# Patient Record
Sex: MALE | Race: WHITE | NOT HISPANIC OR LATINO | Employment: FULL TIME | URBAN - METROPOLITAN AREA
[De-identification: names, ages, dates, MRNs, and addresses within clinical notes are randomized per-mention and may not be internally consistent; named-entity substitution may affect disease eponyms.]

---

## 2018-12-12 ENCOUNTER — OFFICE VISIT (OUTPATIENT)
Dept: UROLOGY | Facility: MEDICAL CENTER | Age: 63
End: 2018-12-12

## 2018-12-12 VITALS
DIASTOLIC BLOOD PRESSURE: 80 MMHG | HEIGHT: 70 IN | BODY MASS INDEX: 31.21 KG/M2 | HEART RATE: 63 BPM | WEIGHT: 218 LBS | SYSTOLIC BLOOD PRESSURE: 140 MMHG

## 2018-12-12 DIAGNOSIS — R31.29 MICROSCOPIC HEMATURIA: Primary | ICD-10-CM

## 2018-12-12 DIAGNOSIS — N40.0 BPH WITHOUT OBSTRUCTION/LOWER URINARY TRACT SYMPTOMS: ICD-10-CM

## 2018-12-12 DIAGNOSIS — N20.0 RENAL STONES: ICD-10-CM

## 2018-12-12 LAB
SL AMB  POCT GLUCOSE, UA: ABNORMAL
SL AMB LEUKOCYTE ESTERASE,UA: ABNORMAL
SL AMB POCT BILIRUBIN,UA: ABNORMAL
SL AMB POCT BLOOD,UA: ABNORMAL
SL AMB POCT CLARITY,UA: CLEAR
SL AMB POCT COLOR,UA: YELLOW
SL AMB POCT KETONES,UA: ABNORMAL
SL AMB POCT NITRITE,UA: ABNORMAL
SL AMB POCT PH,UA: 5.5
SL AMB POCT SPECIFIC GRAVITY,UA: 1.03
SL AMB POCT URINE PROTEIN: ABNORMAL
SL AMB POCT UROBILINOGEN: 0.2

## 2018-12-12 PROCEDURE — 81003 URINALYSIS AUTO W/O SCOPE: CPT | Performed by: UROLOGY

## 2018-12-12 PROCEDURE — 99203 OFFICE O/P NEW LOW 30 MIN: CPT | Performed by: UROLOGY

## 2018-12-12 RX ORDER — LEVOTHYROXINE AND LIOTHYRONINE 19; 4.5 UG/1; UG/1
30 TABLET ORAL DAILY
COMMUNITY

## 2018-12-12 RX ORDER — CHLORAL HYDRATE 500 MG
1000 CAPSULE ORAL DAILY
COMMUNITY

## 2018-12-12 RX ORDER — CELECOXIB 200 MG/1
200 CAPSULE ORAL 2 TIMES DAILY
COMMUNITY

## 2018-12-12 RX ORDER — AMLODIPINE BESYLATE 5 MG/1
5 TABLET ORAL DAILY
COMMUNITY

## 2018-12-12 NOTE — PROGRESS NOTES
Assessment/Plan:    Microscopic hematuria  Likely due to stones  He had this as far back as 2014 and has not developed more severe bleeding  We will obtain renal ultrasound  Probably not do a cysto  Renal stones  No symptoms  Last imaged almost 5 years ago  KUB and renal ultrasound ordered  BPH without obstruction/lower urinary tract symptoms  Asymptomatic  No further workup necessary  Diagnoses and all orders for this visit:    Microscopic hematuria  -     POCT urine dip auto non-scope    Renal stones  -     XR abdomen 1 view kub; Future  -     US retroperitoneal / kidney limited; Future    BPH without obstruction/lower urinary tract symptoms    Other orders  -     thyroid desiccated (ARMOUR) 30 mg tablet; Take 30 mg by mouth daily  -     celecoxib (CeleBREX) 200 mg capsule; Take 200 mg by mouth 2 (two) times a day  -     amLODIPine (NORVASC) 5 mg tablet; Take 5 mg by mouth daily  -     Omega-3 Fatty Acids (FISH OIL) 1,000 mg; Take 1,000 mg by mouth daily  -     Red Yeast Rice Extract (RED YEAST RICE PO); Take by mouth  -     co-enzyme Q-10 30 MG capsule; Take 30 mg by mouth 3 (three) times a day          Subjective:      Patient ID: Patricia Zendejas is a 66 y o  male  HPI   Microscopic hematuria:  Pt notes darker urine at times  Urine tested positive for blood in Janary 2014  He has a stone hx  Robb Munson smoking decades ago  History of kidney stones:  Based on CT from 2013, known retained stones  No sx  BPH:  He notes nocturia x 1  He denies other significant urinary symptoms  He denies gross hematuria, urinary tract infections or incontinence  He is taking neither medications nor supplements for his symptoms  The patient is due for a PSA  AUA SYMPTOM SCORE      Most Recent Value   AUA SYMPTOM SCORE   How often have you had a sensation of not emptying your bladder completely after you finished urinating?   1   How often have you had to urinate again less than two hours after you finished urinating? 0   How often have you found you stopped and started again several times when you urinate? 1   How often have you found it difficult to postpone urination? 0   How often have you had a weak urinary stream?  0   How often have you had to push or strain to begin urination? 0   How many times did you most typically get up to urinate from the time you went to bed at night until the time you got up in the morning? 1   Quality of Life: If you were to spend the rest of your life with your urinary condition just the way it is now, how would you feel about that?  1   AUA SYMPTOM SCORE  3          The following portions of the patient's history were reviewed and updated as appropriate: allergies, current medications, past family history, past medical history, past social history, past surgical history and problem list     Review of Systems   Constitutional: Negative for activity change and fatigue  Respiratory: Negative for shortness of breath and wheezing  Cardiovascular: Negative for chest pain  Hypertension  Gastrointestinal: Negative for abdominal pain  Endocrine:        Hypothyroidsm  Genitourinary: Negative for difficulty urinating, dysuria, frequency, hematuria and urgency  Musculoskeletal: Negative for back pain and gait problem  Skin: Negative  Allergic/Immunologic: Negative  Neurological: Negative  Psychiatric/Behavioral: Negative  Objective:      /80 (BP Location: Left arm, Patient Position: Sitting, Cuff Size: Standard)   Pulse 63   Ht 5' 9 5" (1 765 m)   Wt 98 9 kg (218 lb)   BMI 31 73 kg/m²          Physical Exam   Constitutional: He is oriented to person, place, and time  He appears well-developed and well-nourished  HENT:   Head: Normocephalic and atraumatic  Eyes: EOM are normal    Neck: Normal range of motion  Neck supple  Cardiovascular: Normal rate, regular rhythm and normal heart sounds      Pulmonary/Chest: Effort normal and breath sounds normal    Abdominal: Soft  Bowel sounds are normal  There is no tenderness  Genitourinary:   Genitourinary Comments: The prostate is approximately 30 g, firm, smooth, non-tender  Nl external genitalia  Musculoskeletal: Normal range of motion  Neurological: He is alert and oriented to person, place, and time  Skin: Skin is warm and dry  Psychiatric: He has a normal mood and affect   His behavior is normal  Judgment and thought content normal

## 2018-12-12 NOTE — ASSESSMENT & PLAN NOTE
Likely due to stones  He had this as far back as 2014 and has not developed more severe bleeding  We will obtain renal ultrasound  Probably not do a cysto

## 2018-12-12 NOTE — LETTER
December 12, 2018     Mariel Butcher DO  [address] [facility]    Patient: Harlan Denver   YOB: 1955   Date of Visit: 12/12/2018       Dear Dr Enio Dallas: Thank you for referring Harlan Denver to me for evaluation  Below are my notes for this consultation  If you have questions, please do not hesitate to call me  I look forward to following your patient along with you  Sincerely,        Leslee Hobbs MD        CC: No Recipients  Leslee Hobbs MD  12/12/2018 10:16 AM  Sign at close encounter  Assessment/Plan:    Microscopic hematuria  Likely due to stones  He had this as far back as 2014 and has not developed more severe bleeding  We will obtain renal ultrasound  Probably not do a cysto  Renal stones  No symptoms  Last imaged almost 5 years ago  KUB and renal ultrasound ordered  BPH without obstruction/lower urinary tract symptoms  Asymptomatic  No further workup necessary  Diagnoses and all orders for this visit:    Microscopic hematuria  -     POCT urine dip auto non-scope    Renal stones  -     XR abdomen 1 view kub; Future  -     US retroperitoneal / kidney limited; Future    BPH without obstruction/lower urinary tract symptoms    Other orders  -     thyroid desiccated (ARMOUR) 30 mg tablet; Take 30 mg by mouth daily  -     celecoxib (CeleBREX) 200 mg capsule; Take 200 mg by mouth 2 (two) times a day  -     amLODIPine (NORVASC) 5 mg tablet; Take 5 mg by mouth daily  -     Omega-3 Fatty Acids (FISH OIL) 1,000 mg; Take 1,000 mg by mouth daily  -     Red Yeast Rice Extract (RED YEAST RICE PO); Take by mouth  -     co-enzyme Q-10 30 MG capsule; Take 30 mg by mouth 3 (three) times a day          Subjective:      Patient ID: Harlan Denver is a 66 y o  male  HPI   Microscopic hematuria:  Pt notes darker urine at times  Urine tested positive for blood in Janary 2014  He has a stone hx  Dheeraj Castañeda smoking decades ago        History of kidney stones:  Based on CT from 2013, known retained stones  No sx  BPH:  He notes nocturia x 1  He denies other significant urinary symptoms  He denies gross hematuria, urinary tract infections or incontinence  He is taking neither medications nor supplements for his symptoms  The patient is due for a PSA  AUA SYMPTOM SCORE      Most Recent Value   AUA SYMPTOM SCORE   How often have you had a sensation of not emptying your bladder completely after you finished urinating? 1   How often have you had to urinate again less than two hours after you finished urinating? 0   How often have you found you stopped and started again several times when you urinate? 1   How often have you found it difficult to postpone urination? 0   How often have you had a weak urinary stream?  0   How often have you had to push or strain to begin urination? 0   How many times did you most typically get up to urinate from the time you went to bed at night until the time you got up in the morning? 1   Quality of Life: If you were to spend the rest of your life with your urinary condition just the way it is now, how would you feel about that?  1   AUA SYMPTOM SCORE  3          The following portions of the patient's history were reviewed and updated as appropriate: allergies, current medications, past family history, past medical history, past social history, past surgical history and problem list     Review of Systems   Constitutional: Negative for activity change and fatigue  Respiratory: Negative for shortness of breath and wheezing  Cardiovascular: Negative for chest pain  Hypertension  Gastrointestinal: Negative for abdominal pain  Endocrine:        Hypothyroidsm  Genitourinary: Negative for difficulty urinating, dysuria, frequency, hematuria and urgency  Musculoskeletal: Negative for back pain and gait problem  Skin: Negative  Allergic/Immunologic: Negative  Neurological: Negative  Psychiatric/Behavioral: Negative  Objective:      /80 (BP Location: Left arm, Patient Position: Sitting, Cuff Size: Standard)   Pulse 63   Ht 5' 9 5" (1 765 m)   Wt 98 9 kg (218 lb)   BMI 31 73 kg/m²           Physical Exam   Constitutional: He is oriented to person, place, and time  He appears well-developed and well-nourished  HENT:   Head: Normocephalic and atraumatic  Eyes: EOM are normal    Neck: Normal range of motion  Neck supple  Cardiovascular: Normal rate, regular rhythm and normal heart sounds  Pulmonary/Chest: Effort normal and breath sounds normal    Abdominal: Soft  Bowel sounds are normal  There is no tenderness  Genitourinary:   Genitourinary Comments: The prostate is approximately 30 g, firm, smooth, non-tender  Nl external genitalia  Musculoskeletal: Normal range of motion  Neurological: He is alert and oriented to person, place, and time  Skin: Skin is warm and dry  Psychiatric: He has a normal mood and affect   His behavior is normal  Judgment and thought content normal

## 2018-12-19 ENCOUNTER — TELEPHONE (OUTPATIENT)
Dept: UROLOGY | Facility: MEDICAL CENTER | Age: 63
End: 2018-12-19

## 2018-12-19 NOTE — TELEPHONE ENCOUNTER
----- Message from Allen Arauz MD sent at 12/19/2018  1:01 PM EST -----  Please call the patient regarding his abnormal result  On ultrasound, there is a 7 mm stone in the lower pole of the left kidney  It is not causing any obstruction  This did not show up on a standard x-ray  The stone was probably composed of uric acid  My recommendation is to leave it alone  It could be treated with ureteroscopy and laser fragmentation, however this seems to be overly aggressive to me  Offer the patient an office visit if he wishes to discuss this further    Thank you

## 2018-12-19 NOTE — TELEPHONE ENCOUNTER
Spoke with the patient and relayed Dr Lucio Fu message concerning his imaging results  Patient has no pain or discomfort with the stone and agrees not to have anything done about it at this time  He will keep his next scheduled appointment, but call if he needs us sooner

## 2020-08-25 PROBLEM — R79.89 ELEVATED LFTS: Status: ACTIVE | Noted: 2020-08-25

## 2021-01-09 ENCOUNTER — IMMUNIZATIONS (OUTPATIENT)
Dept: FAMILY MEDICINE CLINIC | Facility: HOSPITAL | Age: 66
End: 2021-01-09

## 2021-01-09 DIAGNOSIS — Z23 ENCOUNTER FOR IMMUNIZATION: ICD-10-CM

## 2021-01-09 PROCEDURE — 91301 SARS-COV-2 / COVID-19 MRNA VACCINE (MODERNA) 100 MCG: CPT

## 2021-01-09 PROCEDURE — 0011A SARS-COV-2 / COVID-19 MRNA VACCINE (MODERNA) 100 MCG: CPT

## 2021-02-06 ENCOUNTER — IMMUNIZATIONS (OUTPATIENT)
Dept: FAMILY MEDICINE CLINIC | Facility: HOSPITAL | Age: 66
End: 2021-02-06

## 2021-02-06 DIAGNOSIS — Z23 ENCOUNTER FOR IMMUNIZATION: Primary | ICD-10-CM

## 2021-02-06 PROCEDURE — 91301 SARS-COV-2 / COVID-19 MRNA VACCINE (MODERNA) 100 MCG: CPT

## 2021-02-06 PROCEDURE — 0012A SARS-COV-2 / COVID-19 MRNA VACCINE (MODERNA) 100 MCG: CPT

## 2021-06-28 ENCOUNTER — TELEPHONE (OUTPATIENT)
Dept: HEMATOLOGY ONCOLOGY | Facility: CLINIC | Age: 66
End: 2021-06-28

## 2021-06-28 NOTE — TELEPHONE ENCOUNTER
New Patient Encounter    New Patient Intake Form   Patient Details:  Nigel Espinosa  1955  [id number]    Background Information:  [address] starts by opening a telephone encounter and gathering the following information   Who is calling to schedule? If not self, relationship to patient? Patient   Referring Provider Al Barber   What is the diagnosis? Abn CARINA, SPEP   Is this Cancer or Non-Cancer? Non-Cancer   Is this diagnosis confirmed? Yes   When was the diagnosis? 6/2021   Is there a confirmed diagnosis from a biopsy/tissue reviewed by pathology? NA   Were outside slides requested? NA   Is patient aware of diagnosis? Yes   Is there a personal history and what kind? No   Is there a family history and what kind? No   Reason for visit? New Diagnosis   Have you had any imaging or labs done? If so: when, where? Yes  4/2021 [facility]   Are records in EPIC? yes   If patient has a prior history of cancer were old records obtained? NA   Was the patient told to bring a disk? No   Does the patient smoke or Vape? No   If yes, how many packs or cartridges per day? Scheduling Information:   [facility]:  [address] [place] [address]     Are there any dates/time the patient cannot be seen? Week of7/26 on vaction   Miscellaneous: pt does not know what ins he has, will get that info and call us back to schedule   After completing the above information, please route to Financial Counselor and the appropriate Nurse Navigator for review

## 2021-06-28 NOTE — TELEPHONE ENCOUNTER
New Patient Request   Patient Details:     Wilman Roque      1955      [id number]      Reason for Appointment   Who is calling to schedule? If not Patient, what is their name? Shae   What is the diagnosis? Elevated LFT's   Who is the referring doctor? Dr Sheth   Scheduling Information   Which department are you scheduling with ?  Hematology    Preferred [facility] Number to call back on? [phone]   Miscellaneous Information:     Please advise the patient, a new patient  will be calling them back within 1 business day

## 2021-07-08 NOTE — TELEPHONE ENCOUNTER
MOHSEN baileyg that pt returned my call yesterday  I left him another voicemail today requesting that he call the [facility] line with his insurance info and to schedule appointment

## 2021-07-15 NOTE — TELEPHONE ENCOUNTER
Spoke w/ patient  Ins info entered, unable to run RTE, insurance needs to be contacted by phone for verification  Finance please review  Pt has been scheduled with Sue Owenss in [facility] on 8/5/5021

## 2021-07-16 NOTE — TELEPHONE ENCOUNTER
[facility]  She stated that this an indemnity plan with limited benefits  Patient has 20 office visits per year  First 2 visits covered at $200, all others covered at $160  Patient has not used any visits for the year  Patient can see any doctor  Will confirm with Elaine King that patient can be seen

## 2021-08-05 ENCOUNTER — CONSULT (OUTPATIENT)
Dept: HEMATOLOGY ONCOLOGY | Facility: CLINIC | Age: 66
End: 2021-08-05
Payer: COMMERCIAL

## 2021-08-05 VITALS
RESPIRATION RATE: 16 BRPM | SYSTOLIC BLOOD PRESSURE: 110 MMHG | TEMPERATURE: 98 F | BODY MASS INDEX: 34.98 KG/M2 | HEART RATE: 64 BPM | DIASTOLIC BLOOD PRESSURE: 68 MMHG | OXYGEN SATURATION: 98 % | WEIGHT: 236.2 LBS | HEIGHT: 69 IN

## 2021-08-05 DIAGNOSIS — R79.89 ELEVATED LFTS: ICD-10-CM

## 2021-08-05 DIAGNOSIS — R77.8 ABNORMAL SPEP: Primary | ICD-10-CM

## 2021-08-05 PROCEDURE — 99204 OFFICE O/P NEW MOD 45 MIN: CPT | Performed by: PHYSICIAN ASSISTANT

## 2021-08-05 RX ORDER — HYDROCHLOROTHIAZIDE 25 MG/1
25 TABLET ORAL DAILY
COMMUNITY
Start: 2021-07-23

## 2021-08-05 RX ORDER — CLONIDINE HYDROCHLORIDE 0.1 MG/1
TABLET ORAL
COMMUNITY
Start: 2021-07-23

## 2021-08-05 NOTE — PROGRESS NOTES
Hematology/Oncology Outpatient Consult  Kylee Espinoza 61 y o  male 1955 [id number]    Date:  8/5/2021      Assessment and Plan:  1  Abnormal SPEP  73-year-old male presents for consultation regarding abnormal SPEP  This was performed by GI regarding her normal LFTs  There is spike noted on SPEP however unfortunately [facility] does not do reflex for immunofixation which is most important in interpreting this result  Patient will require repeat labs  Other labs as below  Follow-up in 3-4 weeks  We reviewed MGUS and multiple myeloma, more likely MGUS  - CBC and differential; Future  - Beta 2 microglobulin, serum; Future  - Comprehensive metabolic panel; Future  - IgG, IgA, IgM; Future  - Immunoglobulin free LT chains blood; Future  - Protein, Total and Protein Electrophoresis with Immunofixation; Future  - IMMUNOFIXATION (SHELLY) AND PROTEIN ELECTROPHORESIS, RANDOM URINE  - Immunofixation,Urine(Reflex Only-Do Not Order)      HPI:  73-year-old male presents for consultation regarding abnormal SPEP  This was completed in April 2021  This showed a restricted M spike in the beta 1 globin region  Immunofixation unfortunately was not done as a reflex test therefore the type in size of M spike is unknown  SPEP was ordered by GI for workup of elevated LFTs  ROS: Review of Systems   Constitutional: Positive for fatigue (mild)  Negative for appetite change, chills, fever and unexpected weight change  Respiratory: Negative for cough and shortness of breath  Cardiovascular: Negative for chest pain, palpitations and leg swelling  Gastrointestinal: Negative for abdominal pain, constipation, diarrhea, nausea and vomiting  Genitourinary: Negative for difficulty urinating, dysuria and hematuria  Musculoskeletal: Negative for arthralgias  Skin: Negative  Neurological: Negative for dizziness, weakness, light-headedness, numbness and headaches  Hematological: Negative      Psychiatric/Behavioral: Negative  Past Medical History:   Diagnosis Date    Abdominal pain     Acute infection of external ear     BPH with obstruction/lower urinary tract symptoms     BPH without urinary obstruction 11/2013    Chills (without fever)     Chronic sinusitis     Disease of thyroid gland     Enlarged prostate     Fatty liver     Headache     Hematuria     Hypertension     Joint pain     Kidney stone     Nausea and vomiting     Retention of urine     Streptococcal sore throat     Ureteral calculi     Wheezing        Past Surgical History:   Procedure Laterality Date    COLONOSCOPY  02/2010    by Dr Angulo-colon polyps    COLONOSCOPY  12/28/2017    MIKE Morrow D  / multiple polyps removed 2 of which were large   COLONOSCOPY  02/20/2019    MIKE Naik  / 3 polyps-tubular adenomas, diverticulosis    CYSTOSCOPY  2013    EYE SURGERY  2000       Social History     Socioeconomic History    Marital status: Single     Spouse name: None    Number of children: None    Years of education: None    Highest education level: None   Occupational History    None   Tobacco Use    Smoking status: Never Smoker    Smokeless tobacco: Never Used   Vaping Use    Vaping Use: Never used   Substance and Sexual Activity    Alcohol use: Yes     Comment: occasionally    Drug use: No    Sexual activity: None   Other Topics Concern    None   Social History Narrative    None     Social Determinants of Health     Financial Resource Strain:     Difficulty of Paying Living Expenses:    Food Insecurity:     Worried About Running Out of Food in the Last Year:     Ran Out of Food in the Last Year:    Transportation Needs:     Lack of Transportation (Medical):      Lack of Transportation (Non-Medical):    Physical Activity:     Days of Exercise per Week:     Minutes of Exercise per Session:    Stress:     Feeling of Stress :    Social Connections:     Frequency of Communication with Friends and Family:     Frequency of Social Gatherings with Friends and Family:     Attends Religion Services:     Active Member of Clubs or Organizations:     Attends Club or Organization Meetings:     Marital Status:    Intimate Partner Violence:     Fear of Current or Ex-Partner:     Emotionally Abused:     Physically Abused:     Sexually Abused:        Family History   Problem Relation Age of Onset    Cancer Father     Breast cancer Mother        No Known Allergies      Current Outpatient Medications:     amLODIPine (NORVASC) 5 mg tablet, Take 5 mg by mouth daily, Disp: , Rfl:     celecoxib (CeleBREX) 200 mg capsule, Take 200 mg by mouth 2 (two) times a day, Disp: , Rfl:     cloNIDine (CATAPRES) 0 1 mg tablet, TAKE 1 TABLET BY MOUTH IN THE MORNING AND 1 IN THE EVENING, Disp: , Rfl:     co-enzyme Q-10 30 MG capsule, Take 30 mg by mouth 2 (two) times a day , Disp: , Rfl:     hydrochlorothiazide (HYDRODIURIL) 25 mg tablet, Take 25 mg by mouth daily, Disp: , Rfl:     Omega-3 Fatty Acids (FISH OIL) 1,000 mg, Take 1,000 mg by mouth daily, Disp: , Rfl:     Red Yeast Rice Extract (RED YEAST RICE PO), Take by mouth, Disp: , Rfl:     thyroid desiccated (ARMOUR) 30 mg tablet, Take 30 mg by mouth daily, Disp: , Rfl:       Physical Exam:  /68 (BP Location: Left arm, Patient Position: Sitting, Cuff Size: Adult)   Pulse 64   Temp 98 °F (36 7 °C) (Temporal)   Resp 16   Ht 5' 9" (1 753 m)   Wt 107 kg (236 lb 3 2 oz)   SpO2 98%   BMI 34 88 kg/m²     Physical Exam  Vitals reviewed  Constitutional:       General: He is not in acute distress  Appearance: He is well-developed  He is not ill-appearing  HENT:      Head: Normocephalic and atraumatic  Eyes:      General: No scleral icterus  Conjunctiva/sclera: Conjunctivae normal    Cardiovascular:      Rate and Rhythm: Normal rate and regular rhythm  Heart sounds: Normal heart sounds  No murmur heard       Pulmonary:      Effort: Pulmonary effort is normal  No respiratory distress  Breath sounds: Normal breath sounds  Abdominal:      Palpations: Abdomen is soft  Tenderness: There is no abdominal tenderness  Musculoskeletal:         General: No tenderness  Normal range of motion  Cervical back: Normal range of motion and neck supple  Right lower leg: No edema  Left lower leg: No edema  Lymphadenopathy:      Cervical: No cervical adenopathy  Skin:     General: Skin is warm and dry  Neurological:      Mental Status: He is alert and oriented to person, place, and time  Cranial Nerves: No cranial nerve deficit  Psychiatric:         Mood and Affect: Mood normal          Behavior: Behavior normal        Patient voiced understanding and agreement in the above discussion  Aware to contact our office with questions/symptoms in the interim  This note has been generated by voice recognition software system  Therefore, there may be spelling, grammar, and or syntax errors  Please contact if questions arise

## 2021-08-18 LAB
ALBUMIN SERPL ELPH-MCNC: 4 G/DL (ref 3.8–4.8)
ALBUMIN SERPL-MCNC: 4.1 G/DL (ref 3.6–5.1)
ALBUMIN/GLOB SERPL: 1.4 (CALC) (ref 1–2.5)
ALP SERPL-CCNC: 44 U/L (ref 35–144)
ALPHA1 GLOB SERPL ELPH-MCNC: 0.2 G/DL (ref 0.2–0.3)
ALPHA2 GLOB SERPL ELPH-MCNC: 0.7 G/DL (ref 0.5–0.9)
ALT SERPL-CCNC: 74 U/L (ref 9–46)
AST SERPL-CCNC: 33 U/L (ref 10–35)
B2 MICROGLOB SERPL-MCNC: 2.32 MG/L
BASOPHILS # BLD AUTO: 38 CELLS/UL (ref 0–200)
BASOPHILS NFR BLD AUTO: 0.6 %
BETA1 GLOB SERPL ELPH-MCNC: 0.6 G/DL (ref 0.4–0.6)
BETA2 GLOB SERPL ELPH-MCNC: 0.6 G/DL (ref 0.2–0.5)
BILIRUB SERPL-MCNC: 0.5 MG/DL (ref 0.2–1.2)
BUN SERPL-MCNC: 13 MG/DL (ref 7–25)
BUN/CREAT SERPL: ABNORMAL (CALC) (ref 6–22)
CALCIUM SERPL-MCNC: 9 MG/DL (ref 8.6–10.3)
CHLORIDE SERPL-SCNC: 101 MMOL/L (ref 98–110)
CO2 SERPL-SCNC: 26 MMOL/L (ref 20–32)
CREAT SERPL-MCNC: 0.98 MG/DL (ref 0.7–1.25)
EOSINOPHIL # BLD AUTO: 179 CELLS/UL (ref 15–500)
EOSINOPHIL NFR BLD AUTO: 2.8 %
ERYTHROCYTE [DISTWIDTH] IN BLOOD BY AUTOMATED COUNT: 12.4 % (ref 11–15)
GAMMA GLOB SERPL ELPH-MCNC: 1 G/DL (ref 0.8–1.7)
GLOBULIN SER CALC-MCNC: 3 G/DL (CALC) (ref 1.9–3.7)
GLUCOSE SERPL-MCNC: 114 MG/DL (ref 65–99)
HCT VFR BLD AUTO: 47.9 % (ref 38.5–50)
HGB BLD-MCNC: 16.2 G/DL (ref 13.2–17.1)
IGA SERPL-MCNC: 554 MG/DL (ref 70–320)
IGG SERPL-MCNC: 1167 MG/DL (ref 600–1540)
IGM SERPL-MCNC: 46 MG/DL (ref 50–300)
INTERPRETATION SERPL IFE-IMP: ABNORMAL
KAPPA LC FREE SER-MCNC: 27.8 MG/L (ref 3.3–19.4)
KAPPA LC FREE/LAMBDA FREE SER: 2.16 {RATIO} (ref 0.26–1.65)
LAMBDA LC FREE SERPL-MCNC: 12.9 MG/L (ref 5.7–26.3)
LYMPHOCYTES # BLD AUTO: 2835 CELLS/UL (ref 850–3900)
LYMPHOCYTES NFR BLD AUTO: 44.3 %
MCH RBC QN AUTO: 34.1 PG (ref 27–33)
MCHC RBC AUTO-ENTMCNC: 33.8 G/DL (ref 32–36)
MCV RBC AUTO: 100.8 FL (ref 80–100)
MONOCYTES # BLD AUTO: 512 CELLS/UL (ref 200–950)
MONOCYTES NFR BLD AUTO: 8 %
NEUTROPHILS # BLD AUTO: 2835 CELLS/UL (ref 1500–7800)
NEUTROPHILS NFR BLD AUTO: 44.3 %
PLATELET # BLD AUTO: 204 THOUSAND/UL (ref 140–400)
PMV BLD REES-ECKER: 10.3 FL (ref 7.5–12.5)
POTASSIUM SERPL-SCNC: 4.2 MMOL/L (ref 3.5–5.3)
PROT SERPL-MCNC: 7.1 G/DL (ref 6.1–8.1)
PROT SERPL-MCNC: 7.1 G/DL (ref 6.1–8.1)
RBC # BLD AUTO: 4.75 MILLION/UL (ref 4.2–5.8)
SL AMB EGFR AFRICAN AMERICAN: 94 ML/MIN/1.73M2
SL AMB EGFR NON AFRICAN AMERICAN: 81 ML/MIN/1.73M2
SODIUM SERPL-SCNC: 138 MMOL/L (ref 135–146)
WBC # BLD AUTO: 6.4 THOUSAND/UL (ref 3.8–10.8)

## 2021-09-01 ENCOUNTER — TELEPHONE (OUTPATIENT)
Dept: HEMATOLOGY ONCOLOGY | Facility: CLINIC | Age: 66
End: 2021-09-01

## 2021-09-01 ENCOUNTER — OFFICE VISIT (OUTPATIENT)
Dept: HEMATOLOGY ONCOLOGY | Facility: CLINIC | Age: 66
End: 2021-09-01
Payer: COMMERCIAL

## 2021-09-01 VITALS
HEIGHT: 69 IN | TEMPERATURE: 97.7 F | DIASTOLIC BLOOD PRESSURE: 70 MMHG | WEIGHT: 235 LBS | BODY MASS INDEX: 34.8 KG/M2 | OXYGEN SATURATION: 96 % | RESPIRATION RATE: 16 BRPM | HEART RATE: 71 BPM | SYSTOLIC BLOOD PRESSURE: 118 MMHG

## 2021-09-01 DIAGNOSIS — R76.8 ELEVATED SERUM IMMUNOGLOBULIN FREE LIGHT CHAIN LEVEL: Primary | ICD-10-CM

## 2021-09-01 PROCEDURE — 99214 OFFICE O/P EST MOD 30 MIN: CPT | Performed by: PHYSICIAN ASSISTANT

## 2021-09-01 NOTE — TELEPHONE ENCOUNTER
Called IR scheduling, per Joe Mackey they will be reaching out to patient to schedule BMBX  Patient was informed

## 2021-09-01 NOTE — H&P (VIEW-ONLY)
Hematology/Oncology Outpatient Follow-up  Elizabeth Castorena 61 y o  male 1955 [id number]    Date:  9/1/2021      Assessment and Plan:  1  Elevated serum immunoglobulin free light chain level  81-year-old male presents for follow-up regarding history of abnormal possible SPEP  This is ordered by GI for workup of abnormal LFTs  The Regional SPEP that was completed did not reflex to immunofixation  Therefore the an SPEP was repeated with immunofixation  SPEP on 08/14/2021 showed no monoclonal gammopathy  CMP showed a normal creatinine, calcium,  Alk-phos, AST was 33  ALT still elevated at 74  CBC showed a normal hemoglobin of 16 2,  8, platelet count 312, WBC 6 4 with a normal differential     IgA is elevated at 554  IgG 1167  IgM 46  Kappa free light chain mildly elevated at 27 8, lambda light chain 12 9, kappa/lambda ratio 2  16  Beta 2 microglobulin 2 32  Reviewed with patient that this could be consistent with light chain MGUS  To meet this criteria patient must have an abnormal free light chain ratio > 1 65  as well as the increased kappa free light chain, for which he fits this criteria  No monoclonal immunoglobulin heavy chain; fits this criteria  Patient is also without hypercalcemia, renal insufficiency, and anemia  Reviewed that we need to know the percentage of clonal lymphoplasmacytic cells in the bone marrow  If these are  fever than 10% then he would fit the criteria of light chain MGUS  Additionally absence of lytic bone lesions would fit criteria; therefore he also needs a skeletal survey  Insurance barriers:   Patient had been only seeing PCP and had a plan with PCP office for cash payment  He recently did purchase insurance  He is unaware on coverage of this insurance  He is unaware of the deductible that he has  He states that the labs that were completed ordered by myself did cost him out of pocket 300 dollars    In regards to an IR guided bone marrow biopsy I am concerned about the out of pocket cost this may cause the patient  I advised him to cause insurance to review this  He also is going to ask his PCP for the best paying area that he could receive the skeletal survey  He will contact us regarding this update  If they do not hear from patient within a week we will contact him  If a bone marrow biopsy in the office is of better cost him this could also be conducted  I did review with Dr Victorina Nava who would be happy to perform this at the [facility] office  Follow up 6-8 weeks  - XR bone survey complete >12 mos; Future  - Ambulatory referral to Interventional Radiology; Future    HPI:  81-year-old male presents for follow up regarding abnormal SPEP  This was completed in April 2021  This showed a restricted M spike in the beta 1 globin region  Immunofixation unfortunately was not done as a reflex test therefore the type in size of M spike is unknown  SPEP was ordered by GI for workup of elevated LFTs  Interval history: no new complaints     ROS: Review of Systems   Constitutional: Negative for appetite change, chills, fatigue, fever and unexpected weight change  HENT: Negative for mouth sores and nosebleeds  Respiratory: Negative for cough and shortness of breath  Cardiovascular: Negative for chest pain, palpitations and leg swelling  Gastrointestinal: Negative for abdominal pain, constipation, diarrhea, nausea and vomiting  Genitourinary: Negative for difficulty urinating, dysuria and hematuria  Musculoskeletal: Positive for arthralgias (right hip, chronic x years, has been told has arthritis; image records not available for review)  Skin: Negative  Neurological: Negative for dizziness, weakness, light-headedness, numbness and headaches  Hematological: Negative  Psychiatric/Behavioral: Negative          Past Medical History:   Diagnosis Date    Abdominal pain     Acute infection of external ear     BPH with obstruction/lower urinary tract symptoms     BPH without urinary obstruction 11/2013    Chills (without fever)     Chronic sinusitis     Disease of thyroid gland     Enlarged prostate     Fatty liver     Headache     Hematuria     Hypertension     Joint pain     Kidney stone     Nausea and vomiting     Retention of urine     Streptococcal sore throat     Ureteral calculi     Wheezing        Past Surgical History:   Procedure Laterality Date    COLONOSCOPY  02/2010    by Dr Angulo-colon polyps    COLONOSCOPY  12/28/2017    MIKE Velazquez D  / multiple polyps removed 2 of which were large   COLONOSCOPY  02/20/2019    MIKE Castle D  / 3 polyps-tubular adenomas, diverticulosis    CYSTOSCOPY  2013    EYE SURGERY  2000       Social History     Socioeconomic History    Marital status: Single     Spouse name: None    Number of children: None    Years of education: None    Highest education level: None   Occupational History    None   Tobacco Use    Smoking status: Never Smoker    Smokeless tobacco: Never Used   Vaping Use    Vaping Use: Never used   Substance and Sexual Activity    Alcohol use: Yes     Comment: occasionally    Drug use: No    Sexual activity: None   Other Topics Concern    None   Social History Narrative    None     Social Determinants of Health     Financial Resource Strain:     Difficulty of Paying Living Expenses:    Food Insecurity:     Worried About Running Out of Food in the Last Year:     Ran Out of Food in the Last Year:    Transportation Needs:     Lack of Transportation (Medical):      Lack of Transportation (Non-Medical):    Physical Activity:     Days of Exercise per Week:     Minutes of Exercise per Session:    Stress:     Feeling of Stress :    Social Connections:     Frequency of Communication with Friends and Family:     Frequency of Social Gatherings with Friends and Family:     Attends Alevism Services:     Active Member of Clubs or Organizations:     Attends Club or Organization Meetings:     Marital Status:    Intimate Partner Violence:     Fear of Current or Ex-Partner:     Emotionally Abused:     Physically Abused:     Sexually Abused:        Family History   Problem Relation Age of Onset    Cancer Father     Breast cancer Mother        Allergies   Allergen Reactions    Lisinopril Cough         Current Outpatient Medications:     amLODIPine (NORVASC) 5 mg tablet, Take 5 mg by mouth daily, Disp: , Rfl:     celecoxib (CeleBREX) 200 mg capsule, Take 200 mg by mouth 2 (two) times a day, Disp: , Rfl:     cloNIDine (CATAPRES) 0 1 mg tablet, TAKE 1 TABLET BY MOUTH IN THE MORNING AND 1 IN THE EVENING, Disp: , Rfl:     co-enzyme Q-10 30 MG capsule, Take 30 mg by mouth 2 (two) times a day , Disp: , Rfl:     hydrochlorothiazide (HYDRODIURIL) 25 mg tablet, Take 25 mg by mouth daily, Disp: , Rfl:     Omega-3 Fatty Acids (FISH OIL) 1,000 mg, Take 1,000 mg by mouth daily, Disp: , Rfl:     Red Yeast Rice Extract (RED YEAST RICE PO), Take by mouth, Disp: , Rfl:     thyroid desiccated (ARMOUR) 30 mg tablet, Take 30 mg by mouth daily, Disp: , Rfl:       Physical Exam:  /70 (BP Location: Left arm, Patient Position: Sitting, Cuff Size: Adult)   Pulse 71   Temp 97 7 °F (36 5 °C) (Temporal)   Resp 16   Ht 5' 9" (1 753 m)   Wt 107 kg (235 lb)   SpO2 96%   BMI 34 70 kg/m²     Physical Exam  Vitals reviewed  Constitutional:       General: He is not in acute distress  Appearance: He is well-developed  He is not ill-appearing  HENT:      Head: Normocephalic and atraumatic  Eyes:      General: No scleral icterus  Conjunctiva/sclera: Conjunctivae normal    Cardiovascular:      Rate and Rhythm: Normal rate and regular rhythm  Heart sounds: Normal heart sounds  No murmur heard  Pulmonary:      Effort: Pulmonary effort is normal  No respiratory distress  Breath sounds: Normal breath sounds  Abdominal:      Palpations: Abdomen is soft  Tenderness: There is no abdominal tenderness  Musculoskeletal:         General: No tenderness  Normal range of motion  Cervical back: Normal range of motion and neck supple  Right lower leg: No edema  Left lower leg: No edema  Lymphadenopathy:      Cervical: No cervical adenopathy  Upper Body:      Right upper body: No supraclavicular or axillary adenopathy  Left upper body: No supraclavicular or axillary adenopathy  Skin:     General: Skin is warm and dry  Neurological:      Mental Status: He is alert and oriented to person, place, and time  Cranial Nerves: No cranial nerve deficit  Psychiatric:         Mood and Affect: Mood normal          Behavior: Behavior normal            Labs:  Lab Results   Component Value Date    WBC 6 4 08/14/2021    HGB 16 2 08/14/2021    HCT 47 9 08/14/2021     8 (H) 08/14/2021     08/14/2021     Lab Results   Component Value Date    K 4 2 08/14/2021     08/14/2021    CO2 26 08/14/2021    BUN 13 08/14/2021    CREATININE 0 98 08/14/2021    CALCIUM 9 0 08/14/2021    AST 33 08/14/2021    ALT 74 (H) 08/14/2021    ALKPHOS 44 08/14/2021     Patient voiced understanding and agreement in the above discussion  Aware to contact our office with questions/symptoms in the interim  This note has been generated by voice recognition software system  Therefore, there may be spelling, grammar, and or syntax errors  Please contact if questions arise

## 2021-09-01 NOTE — PROGRESS NOTES
Hematology/Oncology Outpatient Follow-up  Bailey Mcgovern 61 y o  male 1955 [id number]    Date:  9/1/2021      Assessment and Plan:  1  Elevated serum immunoglobulin free light chain level  62-year-old male presents for follow-up regarding history of abnormal possible SPEP  This is ordered by GI for workup of abnormal LFTs  The Regional SPEP that was completed did not reflex to immunofixation  Therefore the an SPEP was repeated with immunofixation  SPEP on 08/14/2021 showed no monoclonal gammopathy  CMP showed a normal creatinine, calcium,  Alk-phos, AST was 33  ALT still elevated at 74  CBC showed a normal hemoglobin of 16 2,  8, platelet count 857, WBC 6 4 with a normal differential     IgA is elevated at 554  IgG 1167  IgM 46  Kappa free light chain mildly elevated at 27 8, lambda light chain 12 9, kappa/lambda ratio 2  16  Beta 2 microglobulin 2 32  Reviewed with patient that this could be consistent with light chain MGUS  To meet this criteria patient must have an abnormal free light chain ratio > 1 65  as well as the increased kappa free light chain, for which he fits this criteria  No monoclonal immunoglobulin heavy chain; fits this criteria  Patient is also without hypercalcemia, renal insufficiency, and anemia  Reviewed that we need to know the percentage of clonal lymphoplasmacytic cells in the bone marrow  If these are  fever than 10% then he would fit the criteria of light chain MGUS  Additionally absence of lytic bone lesions would fit criteria; therefore he also needs a skeletal survey  Insurance barriers:   Patient had been only seeing PCP and had a plan with PCP office for cash payment  He recently did purchase insurance  He is unaware on coverage of this insurance  He is unaware of the deductible that he has  He states that the labs that were completed ordered by myself did cost him out of pocket 300 dollars    In regards to an IR guided bone marrow biopsy I am concerned about the out of pocket cost this may cause the patient  I advised him to cause insurance to review this  He also is going to ask his PCP for the best paying area that he could receive the skeletal survey  He will contact us regarding this update  If they do not hear from patient within a week we will contact him  If a bone marrow biopsy in the office is of better cost him this could also be conducted  I did review with Dr Delfino Bear who would be happy to perform this at the [facility] office  Follow up 6-8 weeks  - XR bone survey complete >12 mos; Future  - Ambulatory referral to Interventional Radiology; Future    HPI:  79-year-old male presents for follow up regarding abnormal SPEP  This was completed in April 2021  This showed a restricted M spike in the beta 1 globin region  Immunofixation unfortunately was not done as a reflex test therefore the type in size of M spike is unknown  SPEP was ordered by GI for workup of elevated LFTs  Interval history: no new complaints     ROS: Review of Systems   Constitutional: Negative for appetite change, chills, fatigue, fever and unexpected weight change  HENT: Negative for mouth sores and nosebleeds  Respiratory: Negative for cough and shortness of breath  Cardiovascular: Negative for chest pain, palpitations and leg swelling  Gastrointestinal: Negative for abdominal pain, constipation, diarrhea, nausea and vomiting  Genitourinary: Negative for difficulty urinating, dysuria and hematuria  Musculoskeletal: Positive for arthralgias (right hip, chronic x years, has been told has arthritis; image records not available for review)  Skin: Negative  Neurological: Negative for dizziness, weakness, light-headedness, numbness and headaches  Hematological: Negative  Psychiatric/Behavioral: Negative          Past Medical History:   Diagnosis Date    Abdominal pain     Acute infection of external ear     BPH with obstruction/lower urinary tract symptoms     BPH without urinary obstruction 11/2013    Chills (without fever)     Chronic sinusitis     Disease of thyroid gland     Enlarged prostate     Fatty liver     Headache     Hematuria     Hypertension     Joint pain     Kidney stone     Nausea and vomiting     Retention of urine     Streptococcal sore throat     Ureteral calculi     Wheezing        Past Surgical History:   Procedure Laterality Date    COLONOSCOPY  02/2010    by Dr Angulo-colon polyps    COLONOSCOPY  12/28/2017    MIKE Elder  / multiple polyps removed 2 of which were large   COLONOSCOPY  02/20/2019    MIKE Blanco  / 3 polyps-tubular adenomas, diverticulosis    CYSTOSCOPY  2013    EYE SURGERY  2000       Social History     Socioeconomic History    Marital status: Single     Spouse name: None    Number of children: None    Years of education: None    Highest education level: None   Occupational History    None   Tobacco Use    Smoking status: Never Smoker    Smokeless tobacco: Never Used   Vaping Use    Vaping Use: Never used   Substance and Sexual Activity    Alcohol use: Yes     Comment: occasionally    Drug use: No    Sexual activity: None   Other Topics Concern    None   Social History Narrative    None     Social Determinants of Health     Financial Resource Strain:     Difficulty of Paying Living Expenses:    Food Insecurity:     Worried About Running Out of Food in the Last Year:     Ran Out of Food in the Last Year:    Transportation Needs:     Lack of Transportation (Medical):      Lack of Transportation (Non-Medical):    Physical Activity:     Days of Exercise per Week:     Minutes of Exercise per Session:    Stress:     Feeling of Stress :    Social Connections:     Frequency of Communication with Friends and Family:     Frequency of Social Gatherings with Friends and Family:     Attends Caodaism Services:     Active Member of Clubs or Organizations:     Attends Club or Organization Meetings:     Marital Status:    Intimate Partner Violence:     Fear of Current or Ex-Partner:     Emotionally Abused:     Physically Abused:     Sexually Abused:        Family History   Problem Relation Age of Onset    Cancer Father     Breast cancer Mother        Allergies   Allergen Reactions    Lisinopril Cough         Current Outpatient Medications:     amLODIPine (NORVASC) 5 mg tablet, Take 5 mg by mouth daily, Disp: , Rfl:     celecoxib (CeleBREX) 200 mg capsule, Take 200 mg by mouth 2 (two) times a day, Disp: , Rfl:     cloNIDine (CATAPRES) 0 1 mg tablet, TAKE 1 TABLET BY MOUTH IN THE MORNING AND 1 IN THE EVENING, Disp: , Rfl:     co-enzyme Q-10 30 MG capsule, Take 30 mg by mouth 2 (two) times a day , Disp: , Rfl:     hydrochlorothiazide (HYDRODIURIL) 25 mg tablet, Take 25 mg by mouth daily, Disp: , Rfl:     Omega-3 Fatty Acids (FISH OIL) 1,000 mg, Take 1,000 mg by mouth daily, Disp: , Rfl:     Red Yeast Rice Extract (RED YEAST RICE PO), Take by mouth, Disp: , Rfl:     thyroid desiccated (ARMOUR) 30 mg tablet, Take 30 mg by mouth daily, Disp: , Rfl:       Physical Exam:  /70 (BP Location: Left arm, Patient Position: Sitting, Cuff Size: Adult)   Pulse 71   Temp 97 7 °F (36 5 °C) (Temporal)   Resp 16   Ht 5' 9" (1 753 m)   Wt 107 kg (235 lb)   SpO2 96%   BMI 34 70 kg/m²     Physical Exam  Vitals reviewed  Constitutional:       General: He is not in acute distress  Appearance: He is well-developed  He is not ill-appearing  HENT:      Head: Normocephalic and atraumatic  Eyes:      General: No scleral icterus  Conjunctiva/sclera: Conjunctivae normal    Cardiovascular:      Rate and Rhythm: Normal rate and regular rhythm  Heart sounds: Normal heart sounds  No murmur heard  Pulmonary:      Effort: Pulmonary effort is normal  No respiratory distress  Breath sounds: Normal breath sounds  Abdominal:      Palpations: Abdomen is soft  Tenderness: There is no abdominal tenderness  Musculoskeletal:         General: No tenderness  Normal range of motion  Cervical back: Normal range of motion and neck supple  Right lower leg: No edema  Left lower leg: No edema  Lymphadenopathy:      Cervical: No cervical adenopathy  Upper Body:      Right upper body: No supraclavicular or axillary adenopathy  Left upper body: No supraclavicular or axillary adenopathy  Skin:     General: Skin is warm and dry  Neurological:      Mental Status: He is alert and oriented to person, place, and time  Cranial Nerves: No cranial nerve deficit  Psychiatric:         Mood and Affect: Mood normal          Behavior: Behavior normal            Labs:  Lab Results   Component Value Date    WBC 6 4 08/14/2021    HGB 16 2 08/14/2021    HCT 47 9 08/14/2021     8 (H) 08/14/2021     08/14/2021     Lab Results   Component Value Date    K 4 2 08/14/2021     08/14/2021    CO2 26 08/14/2021    BUN 13 08/14/2021    CREATININE 0 98 08/14/2021    CALCIUM 9 0 08/14/2021    AST 33 08/14/2021    ALT 74 (H) 08/14/2021    ALKPHOS 44 08/14/2021     Patient voiced understanding and agreement in the above discussion  Aware to contact our office with questions/symptoms in the interim  This note has been generated by voice recognition software system  Therefore, there may be spelling, grammar, and or syntax errors  Please contact if questions arise

## 2021-09-01 NOTE — PROGRESS NOTES
Do you need a follow up appointment for patient? I called IR they will be contacting patient directly to schedule  Patient was made aware

## 2021-09-01 NOTE — LETTER
September 1, 2021     Jose Rafael Beard, [address]  [address]    Patient: Cathy Mustafa   YOB: 1955   Date of Visit: 9/1/2021       Dear Dr Natasha Oseguera: Thank you for referring Cathy Mustafa to me for evaluation  Below are my notes for this consultation  If you have questions, please do not hesitate to call me  I look forward to following your patient along with you  Sincerely,        [facility], [place]        CC: No Recipients  [facility] [employer] [facility] [place], [place]  9/1/2021  9:47 AM  Incomplete  Hematology/Oncology Outpatient Follow-up  aCthy Mustafa 61 y o  male 1955 [id number]    Date:  9/1/2021      Assessment and Plan:  1  Elevated serum immunoglobulin free light chain level  60-year-old male presents for follow-up regarding history of abnormal possible SPEP  This is ordered by GI for workup of abnormal LFTs  The Regional SPEP that was completed did not reflex to immunofixation  Therefore the an SPEP was repeated with immunofixation  SPEP on 08/14/2021 showed no monoclonal gammopathy  CMP showed a normal creatinine, calcium,  Alk-phos, AST was 33  ALT still elevated at 74  CBC showed a normal hemoglobin of 16 2,  8, platelet count 208, WBC 6 4 with a normal differential     IgA is elevated at 554  IgG 1167  IgM 46  Kappa free light chain mildly elevated at 27 8, lambda light chain 12 9, kappa/lambda ratio 2  16  Beta 2 microglobulin 2 32  Reviewed with patient that this could be consistent with light chain MGUS  To meet this criteria patient must have an abnormal free light chain ratio > 1 65  as well as the increased kappa free light chain, for which he fits this criteria  No monoclonal immunoglobulin heavy chain; fits this criteria  Patient is also without hypercalcemia, renal insufficiency, and anemia  Reviewed that we need to know the percentage of clonal lymphoplasmacytic cells in the bone marrow    If these are  fever than 10% then he would fit the criteria of light chain MGUS  Additionally absence of lytic bone lesions would fit criteria; therefore he also needs a skeletal survey  Insurance barriers:   Patient had been only seeing PCP and had a plan with PCP office for cash payment  He recently did purchase insurance  He is unaware on coverage of this insurance so  He is unaware of the deductible that he has  He states that the labs that were completed ordered by myself did cost a metabolic 171 dollars  In regards to an IR guided bone marrow biopsy I am concerned about the out of pocket cost this may cause the patient  I advised him to cause insurance to review this  He also is going to ask his PCP for the best paying area that he could receive the skeletal survey  He will contact us regarding this update  If they do not hear from patient within a week we will contact him  If a bone marrow biopsy in the office is of better cost him this could also be conducted  I did review with Dr Kaykay Patel who would be happy to perform this at the [facility] office  Follow up 6-8 weeks  - XR bone survey complete >12 mos; Future  - Ambulatory referral to Interventional Radiology; Future    HPI:  21-year-old male presents for consultation regarding abnormal SPEP  This was completed in April 2021  This showed a restricted M spike in the beta 1 globin region  Immunofixation unfortunately was not done as a reflex test therefore the type in size of M spike is unknown  SPEP was ordered by GI for workup of elevated LFTs      Interval history:    ROS: Review of Systems    Past Medical History:   Diagnosis Date    Abdominal pain     Acute infection of external ear     BPH with obstruction/lower urinary tract symptoms     BPH without urinary obstruction 11/2013    Chills (without fever)     Chronic sinusitis     Disease of thyroid gland     Enlarged prostate     Fatty liver     Headache     Hematuria     Hypertension     Joint pain     Kidney stone     Nausea and vomiting     Retention of urine     Streptococcal sore throat     Ureteral calculi     Wheezing        Past Surgical History:   Procedure Laterality Date    COLONOSCOPY  02/2010    by Dr Angulo-colon polyps    COLONOSCOPY  12/28/2017    MIKE López  / multiple polyps removed 2 of which were large   COLONOSCOPY  02/20/2019    MIKE Bullard  / 3 polyps-tubular adenomas, diverticulosis    CYSTOSCOPY  2013    EYE SURGERY  2000       Social History     Socioeconomic History    Marital status: Single     Spouse name: None    Number of children: None    Years of education: None    Highest education level: None   Occupational History    None   Tobacco Use    Smoking status: Never Smoker    Smokeless tobacco: Never Used   Vaping Use    Vaping Use: Never used   Substance and Sexual Activity    Alcohol use: Yes     Comment: occasionally    Drug use: No    Sexual activity: None   Other Topics Concern    None   Social History Narrative    None     Social Determinants of Health     Financial Resource Strain:     Difficulty of Paying Living Expenses:    Food Insecurity:     Worried About Running Out of Food in the Last Year:     Ran Out of Food in the Last Year:    Transportation Needs:     Lack of Transportation (Medical):      Lack of Transportation (Non-Medical):    Physical Activity:     Days of Exercise per Week:     Minutes of Exercise per Session:    Stress:     Feeling of Stress :    Social Connections:     Frequency of Communication with Friends and Family:     Frequency of Social Gatherings with Friends and Family:     Attends Hinduism Services:     Active Member of Clubs or Organizations:     Attends Club or Organization Meetings:     Marital Status:    Intimate Partner Violence:     Fear of Current or Ex-Partner:     Emotionally Abused:     Physically Abused:     Sexually Abused:        Family History   Problem Relation Age of Onset    Cancer Father     Breast cancer Mother        Allergies   Allergen Reactions    Lisinopril Cough         Current Outpatient Medications:     amLODIPine (NORVASC) 5 mg tablet, Take 5 mg by mouth daily, Disp: , Rfl:     celecoxib (CeleBREX) 200 mg capsule, Take 200 mg by mouth 2 (two) times a day, Disp: , Rfl:     cloNIDine (CATAPRES) 0 1 mg tablet, TAKE 1 TABLET BY MOUTH IN THE MORNING AND 1 IN THE EVENING, Disp: , Rfl:     co-enzyme Q-10 30 MG capsule, Take 30 mg by mouth 2 (two) times a day , Disp: , Rfl:     hydrochlorothiazide (HYDRODIURIL) 25 mg tablet, Take 25 mg by mouth daily, Disp: , Rfl:     Omega-3 Fatty Acids (FISH OIL) 1,000 mg, Take 1,000 mg by mouth daily, Disp: , Rfl:     Red Yeast Rice Extract (RED YEAST RICE PO), Take by mouth, Disp: , Rfl:     thyroid desiccated (ARMOUR) 30 mg tablet, Take 30 mg by mouth daily, Disp: , Rfl:       Physical Exam:  /70 (BP Location: Left arm, Patient Position: Sitting, Cuff Size: Adult)   Pulse 71   Temp 97 7 °F (36 5 °C) (Temporal)   Resp 16   Ht 5' 9" (1 753 m)   Wt 107 kg (235 lb)   SpO2 96%   BMI 34 70 kg/m²     Physical Exam      Labs:  Lab Results   Component Value Date    WBC 6 4 08/14/2021    HGB 16 2 08/14/2021    HCT 47 9 08/14/2021     8 (H) 08/14/2021     08/14/2021     Lab Results   Component Value Date    K 4 2 08/14/2021     08/14/2021    CO2 26 08/14/2021    BUN 13 08/14/2021    CREATININE 0 98 08/14/2021    CALCIUM 9 0 08/14/2021    AST 33 08/14/2021    ALT 74 (H) 08/14/2021    ALKPHOS 44 08/14/2021     Patient voiced understanding and agreement in the above discussion  Aware to contact our office with questions/symptoms in the interim  This note has been generated by voice recognition software system  Therefore, there may be spelling, grammar, and or syntax errors  Please contact if questions arise                Zi Hernandez PA-C 9/1/2021  9:29 AM  Incomplete  Hematology/Oncology Outpatient Follow-up  Mihai Conklin 61 y o  male 1955 [id number]    Date:  9/1/2021      Assessment and Plan:    SPEP on 08/14/2021 showed no monoclonal gammopathy  CMP showed a normal creatinine, calcium,  Alk-phos, AST was 33  ALT still elevated at 74  CBC showed a normal hemoglobin of 16 2,  8, platelet count 040, WBC 6 4 with a normal differential     IgA is elevated at 554  IgG 1167  IgM 46  Kappa free light chain mildly elevated at 27 8, lambda light chain 12 9, kappa/lambda ratio 2  16  Beta 2 microglobulin 2 32  Light chain MGUS (LC-MGUS) is diagnosed by meeting the following three criteria (algorithm 1) [1,21]:    ?The presence of an abnormal FLC ratio (ie, ratio of kappa to lambda FLCs <0 26 or >1 65)    ? Increased level of the appropriate involved light chain (eg, increased kappa FLC in patients with a ratio >1 65     ? No monoclonal immunoglobulin heavy chain (IgG, IgA, IgD, or IgM)    ? Fewer than 10 percent clonal lymphoplasmacytic cells in the bone marrow    ? The absence of lytic bone lesions, anemia, hypercalcemia, and renal insufficiency related to the plasma cell proliferative process  HPI:  72-year-old male presents for consultation regarding abnormal SPEP  This was completed in April 2021  This showed a restricted M spike in the beta 1 globin region  Immunofixation unfortunately was not done as a reflex test therefore the type in size of M spike is unknown  SPEP was ordered by GI for workup of elevated LFTs      Interval history:    ROS: Review of Systems    Past Medical History:   Diagnosis Date    Abdominal pain     Acute infection of external ear     BPH with obstruction/lower urinary tract symptoms     BPH without urinary obstruction 11/2013    Chills (without fever)     Chronic sinusitis     Disease of thyroid gland     Enlarged prostate     Fatty liver     Headache     Hematuria     Hypertension     Joint pain     Kidney stone     Nausea and vomiting     Retention of urine     Streptococcal sore throat     Ureteral calculi     Wheezing        Past Surgical History:   Procedure Laterality Date    COLONOSCOPY  02/2010    by Dr Angulo-colon polyps    COLONOSCOPY  12/28/2017    MIKE Pelayo  / multiple polyps removed 2 of which were large   COLONOSCOPY  02/20/2019    MIKE Martin  / 3 polyps-tubular adenomas, diverticulosis    CYSTOSCOPY  2013    EYE SURGERY  2000       Social History     Socioeconomic History    Marital status: Single     Spouse name: Not on file    Number of children: Not on file    Years of education: Not on file    Highest education level: Not on file   Occupational History    Not on file   Tobacco Use    Smoking status: Never Smoker    Smokeless tobacco: Never Used   Vaping Use    Vaping Use: Never used   Substance and Sexual Activity    Alcohol use: Yes     Comment: occasionally    Drug use: No    Sexual activity: Not on file   Other Topics Concern    Not on file   Social History Narrative    Not on file     Social Determinants of Health     Financial Resource Strain:     Difficulty of Paying Living Expenses:    Food Insecurity:     Worried About Running Out of Food in the Last Year:     [place]lyn of Food in the Last Year:    Transportation Needs:     Lack of Transportation (Medical):      Lack of Transportation (Non-Medical):    Physical Activity:     Days of Exercise per Week:     Minutes of Exercise per Session:    Stress:     Feeling of Stress :    Social Connections:     Frequency of Communication with Friends and Family:     Frequency of Social Gatherings with Friends and Family:     Attends Anabaptism Services:     Active Member of Clubs or Organizations:     Attends Club or Organization Meetings:     Marital Status:    Intimate Partner Violence:     Fear of Current or Ex-Partner:     Emotionally Abused:  Physically Abused:     Sexually Abused:        Family History   Problem Relation Age of Onset    Cancer Father     Breast cancer Mother        No Known Allergies      Current Outpatient Medications:     amLODIPine (NORVASC) 5 mg tablet, Take 5 mg by mouth daily, Disp: , Rfl:     celecoxib (CeleBREX) 200 mg capsule, Take 200 mg by mouth 2 (two) times a day, Disp: , Rfl:     cloNIDine (CATAPRES) 0 1 mg tablet, TAKE 1 TABLET BY MOUTH IN THE MORNING AND 1 IN THE EVENING, Disp: , Rfl:     co-enzyme Q-10 30 MG capsule, Take 30 mg by mouth 2 (two) times a day , Disp: , Rfl:     hydrochlorothiazide (HYDRODIURIL) 25 mg tablet, Take 25 mg by mouth daily, Disp: , Rfl:     Omega-3 Fatty Acids (FISH OIL) 1,000 mg, Take 1,000 mg by mouth daily, Disp: , Rfl:     Red Yeast Rice Extract (RED YEAST RICE PO), Take by mouth, Disp: , Rfl:     thyroid desiccated (ARMOUR) 30 mg tablet, Take 30 mg by mouth daily, Disp: , Rfl:       Physical Exam:  There were no vitals taken for this visit  Physical Exam      Labs:  Lab Results   Component Value Date    WBC 6 4 08/14/2021    HGB 16 2 08/14/2021    HCT 47 9 08/14/2021     8 (H) 08/14/2021     08/14/2021     Lab Results   Component Value Date    K 4 2 08/14/2021     08/14/2021    CO2 26 08/14/2021    BUN 13 08/14/2021    CREATININE 0 98 08/14/2021    CALCIUM 9 0 08/14/2021    AST 33 08/14/2021    ALT 74 (H) 08/14/2021    ALKPHOS 44 08/14/2021     @RESULTFAST(TSH)@    Patient voiced understanding and agreement in the above discussion  Aware to contact our office with questions/symptoms in the interim  This note has been generated by voice recognition software system  Therefore, there may be spelling, grammar, and or syntax errors  Please contact if questions arise

## 2021-09-14 NOTE — PRE-PROCEDURE INSTRUCTIONS
Pre-procedure Instructions for Interventional Radiology  [facility] [address] [phone]    You are scheduled for a/an Bone Marrow biopsy  On Thursday 9/23/21  Your tentative arrival time is 0745  Short stay will notify you the day before your procedure with the exact arrival time and the location to arrive  To prepare for your procedure:  1  Please arrange for someone to drive you home after the procedure and stay with you until the next morning if you are instructed to do so  This is typically for patients receiving some type of sedative or anesthetic for the procedure  2  DO NOT EAT OR DRINK ANYTHING after midnight on the evening before your procedure including candy & gum   3  ONLY SIPS OF WATER with your medications are allowed on the morning of your procedure  4  TAKE ALL OF YOUR REGULAR MEDICATIONS THE MORNING OF YOUR PROCEDURE with sips of water! We may call you to stop some of your blood sugar, blood pressure and blood thinning medications depending on the procedure  Please take all of these medications unless we instruct you to stop them  5  If you have an allergy to x-ray dye, please contact Interventional Radiology for an x-ray dye preparation which usually consists of an oral steroid and Benadryl  The day of your procedure:  1  Bring a list of the medications you take at home  2  Bring medications you take for breathing problems (such as inhalers), medications for chest pain, or both  3  Bring a case for your glasses or contacts  4  Bring your insurance card and a form of photo ID   5  Please leave all valuables such as credit cards and jewelry at home  6  Report to the registration desk in the main lobby at the [facility]  Ask to be directed to [facility]  7  While your procedure is being performed, your family may wait in the Radiology Waiting Room on the 1st floor in Radiology  if they need to leave, they may provide a number to be called following the procedure  8  Be prepared to stay overnight just in case  Sometimes procedures will indicate the need for further observation or treatment  9  If you are scheduled for a follow-up visit with the Interventional Radiologist after your procedure, you will be called with a date and time  10  Covid Vaccine completed Feb 2021      Special Instructions (Medications to stop taking before your procedure etc )

## 2021-09-23 ENCOUNTER — HOSPITAL ENCOUNTER (OUTPATIENT)
Dept: RADIOLOGY | Facility: HOSPITAL | Age: 66
Discharge: HOME | End: 2021-09-23
Attending: INTERNAL MEDICINE | Admitting: INTERNAL MEDICINE
Payer: COMMERCIAL

## 2021-09-23 VITALS
DIASTOLIC BLOOD PRESSURE: 101 MMHG | SYSTOLIC BLOOD PRESSURE: 151 MMHG | RESPIRATION RATE: 17 BRPM | HEIGHT: 70 IN | BODY MASS INDEX: 33.5 KG/M2 | WEIGHT: 234 LBS | OXYGEN SATURATION: 92 % | HEART RATE: 64 BPM

## 2021-09-23 DIAGNOSIS — D47.2 MGUS (MONOCLONAL GAMMOPATHY OF UNKNOWN SIGNIFICANCE): ICD-10-CM

## 2021-09-23 PROCEDURE — 88365 INSITU HYBRIDIZATION (FISH): CPT | Performed by: PATHOLOGY

## 2021-09-23 PROCEDURE — 88184 FLOWCYTOMETRY/ TC 1 MARKER: CPT | Performed by: PHYSICIAN ASSISTANT

## 2021-09-23 PROCEDURE — 88360 TUMOR IMMUNOHISTOCHEM/MANUAL: CPT | Performed by: PATHOLOGY

## 2021-09-23 PROCEDURE — 88185 FLOWCYTOMETRY/TC ADD-ON: CPT

## 2021-09-23 PROCEDURE — 88305 TISSUE EXAM BY PATHOLOGIST: CPT | Performed by: PATHOLOGY

## 2021-09-23 PROCEDURE — 88341 IMHCHEM/IMCYTCHM EA ADD ANTB: CPT | Performed by: PATHOLOGY

## 2021-09-23 PROCEDURE — 88313 SPECIAL STAINS GROUP 2: CPT | Performed by: PATHOLOGY

## 2021-09-23 PROCEDURE — 88311 DECALCIFY TISSUE: CPT | Performed by: PATHOLOGY

## 2021-09-23 PROCEDURE — 85097 BONE MARROW INTERPRETATION: CPT | Performed by: PATHOLOGY

## 2021-09-23 PROCEDURE — 88364 INSITU HYBRIDIZATION (FISH): CPT | Performed by: PATHOLOGY

## 2021-09-23 PROCEDURE — 38222 DX BONE MARROW BX & ASPIR: CPT

## 2021-09-23 PROCEDURE — 88342 IMHCHEM/IMCYTCHM 1ST ANTB: CPT | Performed by: PATHOLOGY

## 2021-09-23 PROCEDURE — 99152 MOD SED SAME PHYS/QHP 5/>YRS: CPT | Performed by: INTERNAL MEDICINE

## 2021-09-23 PROCEDURE — 99152 MOD SED SAME PHYS/QHP 5/>YRS: CPT

## 2021-09-23 PROCEDURE — 38222 DX BONE MARROW BX & ASPIR: CPT | Performed by: INTERNAL MEDICINE

## 2021-09-23 PROCEDURE — 77002 NEEDLE LOCALIZATION BY XRAY: CPT | Performed by: INTERNAL MEDICINE

## 2021-09-23 PROCEDURE — 77002 NEEDLE LOCALIZATION BY XRAY: CPT

## 2021-09-23 RX ORDER — SODIUM CHLORIDE 9 MG/ML
75 INJECTION, SOLUTION INTRAVENOUS CONTINUOUS
Status: DISCONTINUED | OUTPATIENT
Start: 2021-09-23 | End: 2021-09-23 | Stop reason: HOSPADM

## 2021-09-23 RX ORDER — MIDAZOLAM HYDROCHLORIDE 2 MG/2ML
INJECTION, SOLUTION INTRAMUSCULAR; INTRAVENOUS CODE/TRAUMA/SEDATION MEDICATION
Status: COMPLETED | OUTPATIENT
Start: 2021-09-23 | End: 2021-09-23

## 2021-09-23 RX ORDER — FENTANYL CITRATE 50 UG/ML
INJECTION, SOLUTION INTRAMUSCULAR; INTRAVENOUS CODE/TRAUMA/SEDATION MEDICATION
Status: COMPLETED | OUTPATIENT
Start: 2021-09-23 | End: 2021-09-23

## 2021-09-23 RX ADMIN — MIDAZOLAM 1 MG: 1 INJECTION INTRAMUSCULAR; INTRAVENOUS at 09:04

## 2021-09-23 RX ADMIN — FENTANYL CITRATE 50 MCG: 50 INJECTION INTRAMUSCULAR; INTRAVENOUS at 09:04

## 2021-09-23 RX ADMIN — SODIUM CHLORIDE 75 ML/HR: 0.9 INJECTION, SOLUTION INTRAVENOUS at 08:12

## 2021-09-23 NOTE — BRIEF OP NOTE (RAD/CATH)
INTERVENTIONAL RADIOLOGY PROCEDURE NOTE    Date: 9/23/2021    Procedure: IR BIOPSY BONE MARROW    Preoperative diagnosis:   1  MGUS (monoclonal gammopathy of unknown significance)         Postoperative diagnosis: Same  Surgeon: Claudene Moon, MD     Assistant: None  No qualified resident was available  Blood loss: minimal    Specimens: Right iliac bone aspirate(8ml) and 11g core biopsy  Findings: Right iliac bone marrow aspiration and biopsy  Complications: None immediate      Anesthesia: conscious sedation

## 2021-09-23 NOTE — SEDATION DOCUMENTATION
Right iliac crest bone marrow biopsy completed by Dr Deonte Machuca without complications  Pt bedrest time starts at 0930

## 2021-09-23 NOTE — DISCHARGE INSTRUCTIONS
Procedural Sedation   WHAT YOU NEED TO KNOW:   Procedural sedation is medicine used during procedures to help you feel relaxed and calm  You will remember little to none of the procedure  After sedation you may feel tired, weak, or unsteady on your feet  You may also have trouble concentrating or short-term memory loss  These symptoms should go away in 24 hours or less  DISCHARGE INSTRUCTIONS:   Call 911 or have someone else call for any of the following:   · You have sudden trouble breathing      · You cannot be woken  ·    Contact Interventional Radiology at [phone] PATIENTS: Contact Interventional Radiology at [phone] [facility] PATIENTS: Contact Interventional Radiology at [phone]) if any of the following occur:      · You have a severe headache or dizziness      · Your heart is beating faster than usual     · You have a fever or chills      · Your skin is itchy, swollen, or you have a rash      · You have nausea or are vomiting for more than 8 hours after the procedure       · You have questions or concerns about your condition or care  Self-care:   · Have someone stay with you for 24 hours  This person can drive you to errands and help you do things around the house  This person can also watch for problems       · Rest and do quiet activities for 24 hours  Do not exercise, ride a bike, or play sports  Stand up slowly to prevent dizziness and falls  Take short walks around the house with another person  Slowly return to your usual activities the next day       · Do not drive or use dangerous machines or tools for 24 hours  You may injure yourself or others  Examples include a lawnmower, saw, or drill  Do not return to work for 24 hours if you use dangerous machines or tools for work       · Do not make important decisions for 24 hours  For example, do not sign important papers or invest money       · Drink liquids as directed  Liquids help flush the sedation medicine out of your body  Ask how much liquid to drink each day and which liquids are best for you       · Eat small, frequent meals to prevent nausea and vomiting  Start with clear liquids such as juice or broth  If you do not vomit after clear liquids, you can eat your usual foods       · Do not drink alcohol or take medicines that make you drowsy  This includes medicines that help you sleep and anxiety medicines  Ask your healthcare provider if it is safe for you to take pain medicine  Follow up with your healthcare provider as directed: Write down your questions so you remember to ask them during your visits  Bone Marrow Biopsy     WHAT YOU NEED TO KNOW:   A bone marrow biopsy is a procedure to remove a small amount of bone marrow from your bone  Bone marrow is the soft tissue inside your bone that helps to make blood cells  The sample is tested for disease or infection  DISCHARGE INSTRUCTIONS:     1  Limit your activities day of biopsy as directed by your doctor  2  Use medication as ordered  3  Return to your normal diet  Small sips of flat soda will help with nausea  4  Remove band-aid or dressing 24 hours after procedure  Contact Interventional Radiology at [phone] [facility] PATIENTS: Contact Interventional Radiology at [phone]) [facility] PATIENTS: Contact Interventional Radiology at [phone]) if:    1  Difficulty breathing, nausea or vomiting  2  Chills or fever above 101 F     3  Pain at biopsy site not relieved by medication  4  Develop any redness, swelling, heat, unusual drainage, heavy bruising or bleeding from biopsy site

## 2021-09-23 NOTE — INTERVAL H&P NOTE
Update: (This section must be completed if the H&P was completed greater than 24 hrs to procedure or admission)    H&P reviewed  After examining the patient, I find no changed to the H&P since it had been written  /95 (BP Location: Left arm)   Pulse 73   Resp 21   Ht 5' 10" (1 778 m)   Wt 106 kg (234 lb)   SpO2 95%   BMI 33 58 kg/m²      61year old male with abnormal possible SPEP  Plan for fluoroscopically guided bone marrow biopsy  Procedure, risks, benefits explained to patient  Consent obtained at bedside  Patient re-evaluated   Accept as history and physical     Nathan Rudd MD/September 23, 2021/8:39 AM

## 2021-09-24 LAB — SCAN RESULT: NORMAL

## 2021-10-07 ENCOUNTER — APPOINTMENT (OUTPATIENT)
Dept: RADIOLOGY | Age: 66
End: 2021-10-07
Payer: COMMERCIAL

## 2021-10-07 DIAGNOSIS — R76.8 ELEVATED SERUM IMMUNOGLOBULIN FREE LIGHT CHAIN LEVEL: ICD-10-CM

## 2021-10-07 PROCEDURE — 77075 RADEX OSSEOUS SURVEY COMPL: CPT

## 2021-10-13 ENCOUNTER — TELEPHONE (OUTPATIENT)
Dept: HEMATOLOGY ONCOLOGY | Facility: CLINIC | Age: 66
End: 2021-10-13

## 2021-10-21 ENCOUNTER — OFFICE VISIT (OUTPATIENT)
Dept: HEMATOLOGY ONCOLOGY | Facility: CLINIC | Age: 66
End: 2021-10-21
Payer: COMMERCIAL

## 2021-10-21 VITALS
WEIGHT: 238.5 LBS | SYSTOLIC BLOOD PRESSURE: 130 MMHG | HEART RATE: 90 BPM | DIASTOLIC BLOOD PRESSURE: 90 MMHG | OXYGEN SATURATION: 95 % | HEIGHT: 70 IN | RESPIRATION RATE: 16 BRPM | BODY MASS INDEX: 34.14 KG/M2

## 2021-10-21 DIAGNOSIS — R76.8 ELEVATED SERUM IMMUNOGLOBULIN FREE LIGHT CHAIN LEVEL: Primary | ICD-10-CM

## 2021-10-21 PROCEDURE — 99214 OFFICE O/P EST MOD 30 MIN: CPT | Performed by: PHYSICIAN ASSISTANT

## 2022-10-21 ENCOUNTER — TELEPHONE (OUTPATIENT)
Dept: HEMATOLOGY ONCOLOGY | Facility: CLINIC | Age: 67
End: 2022-10-21

## 2022-10-25 ENCOUNTER — OFFICE VISIT (OUTPATIENT)
Dept: HEMATOLOGY ONCOLOGY | Facility: CLINIC | Age: 67
End: 2022-10-25
Payer: COMMERCIAL

## 2022-10-25 VITALS
DIASTOLIC BLOOD PRESSURE: 88 MMHG | BODY MASS INDEX: 33.36 KG/M2 | TEMPERATURE: 98 F | SYSTOLIC BLOOD PRESSURE: 120 MMHG | WEIGHT: 233 LBS | OXYGEN SATURATION: 98 % | RESPIRATION RATE: 16 BRPM | HEIGHT: 70 IN | HEART RATE: 70 BPM

## 2022-10-25 DIAGNOSIS — R77.8 ABNORMAL SPEP: ICD-10-CM

## 2022-10-25 DIAGNOSIS — R76.8 ELEVATED SERUM IMMUNOGLOBULIN FREE LIGHT CHAIN LEVEL: Primary | ICD-10-CM

## 2022-10-25 PROCEDURE — 99214 OFFICE O/P EST MOD 30 MIN: CPT | Performed by: PHYSICIAN ASSISTANT

## 2022-10-25 NOTE — PROGRESS NOTES
Hematology/Oncology Outpatient Follow-up  Mya Hernandez 74 y o  male 1955 [id number]    Date:  10/25/2022      Assessment and Plan:    1  Elevated serum immunoglobulin free light chain level  74year old male presents for follow up for work up for light chain MGUS      08/14/2021 labs   SPEP on  showed no monoclonal gammopathy        IgA is elevated at 554  IgG 1167   IgM 46      [place] free light chain mildly elevated at 27 8, lambda light chain 12 9, kappa/lambda ratio 2  16      Beta 2 microglobulin 2 32      Bone marrow showed polyclonal plasma cells 8%, therefore not consistent with plasma dyscrasia, possibly related to his   liver disease       Skeletal survey negative but did show severe left hip osteoarthritis  He was requested to have labs prior to today's visit but did not complete  He completes at [address]      He is requested to complete and call the office to review results  Follow up in 1 year with labs too      - CBC and differential; Future  - Immunoglobulin free LT chains blood; Future  - IgG, IgA, IgM; Future  - Protein, Total and Protein Electrophoresis with Immunofixation; Future    - CBC and differential; Future  - Immunoglobulin free LT chains blood; Future  - IgG, IgA, IgM; Future  - Protein, Total and Protein Electrophoresis with Immunofixation; Future    Also, reviewed iron studies from GI  Mild elevation, he drinks 9 pints of beer on average a week, could be contributing       HPI:  72-year-old male presents for consultation regarding abnormal SPEP   This was completed in April 2021   This showed a restricted M spike in the beta 1 globin region   Immunofixation unfortunately was not done as a reflex test therefore the type in size of M spike is unknown   SPEP was ordered by GI for workup of elevated LFTs      SPEP on 08/14/2021 showed no monoclonal gammopathy        CMP showed a normal creatinine, calcium,  Alk-phos, AST was 33   ALT still elevated at 74      CBC showed a normal hemoglobin of 16 2,  8, platelet count 816, WBC 6 4 with a normal differential      IgA is elevated at 554  IgG 1167   IgM 46      [place] free light chain mildly elevated at 27 8, lambda light chain 12 9, kappa/lambda ratio 2  16      Beta 2 microglobulin 2 32      Patient seen in follow up on 9/1/21  Reviewed with patient that this could be consistent with light chain MGUS   To meet this criteria patient must have an abnormal free light chain ratio > 1 65  as well as the increased kappa free light chain, for which he fits this criteria    No monoclonal immunoglobulin heavy chain; fits this criteria        Patient is also without hypercalcemia, renal insufficiency, and anemia      Reviewed that we need to know the percentage of clonal lymphoplasmacytic cells in the bone marrow   If these are  fever than 10% then he would fit the criteria of light chain MGUS   Additionally absence of lytic bone lesions would fit criteria; therefore he also needs a skeletal survey  Interval history:    ROS: Review of Systems   Constitutional: Negative for activity change, appetite change, chills, fatigue, fever and unexpected weight change  Respiratory: Negative for cough and shortness of breath  Cardiovascular: Negative for chest pain, palpitations and leg swelling  Gastrointestinal: Negative for abdominal pain, constipation, diarrhea, nausea and vomiting  Genitourinary: Negative for difficulty urinating, dysuria and hematuria  Musculoskeletal: Negative for arthralgias  Skin: Negative  Neurological: Negative for dizziness, weakness, light-headedness, numbness and headaches  Hematological: Negative  Psychiatric/Behavioral: Negative          Past Medical History:   Diagnosis Date   • Abdominal pain    • Acute infection of external ear    • BPH with obstruction/lower urinary tract symptoms    • BPH without urinary obstruction 11/2013   • Chills (without fever)    • Chronic sinusitis    • Disease of thyroid gland    • Enlarged prostate    • Fatty liver    • Headache    • Hematuria    • Hypertension    • Joint pain    • Kidney stone    • Nausea and vomiting    • Retention of urine    • Streptococcal sore throat    • Ureteral calculi    • Wheezing        Past Surgical History:   Procedure Laterality Date   • COLONOSCOPY  02/2010    by Dr Angulo-colon polyps   • COLONOSCOPY  12/28/2017    MIKE Byers  / multiple polyps removed 2 of which were large  • COLONOSCOPY  02/20/2019    MIKE Penaloza  / 3 polyps-tubular adenomas, diverticulosis   • CYSTOSCOPY  2013   • EYE SURGERY  2000   • IR BIOPSY BONE MARROW  9/23/2021       Social History     Socioeconomic History   • Marital status: Single     Spouse name: None   • Number of children: None   • Years of education: None   • Highest education level: None   Occupational History   • Occupation:    Tobacco Use   • Smoking status: Never Smoker   • Smokeless tobacco: Never Used   Vaping Use   • Vaping Use: Never used   Substance and Sexual Activity   • Alcohol use:  Yes     Alcohol/week: 6 0 - 8 0 standard drinks     Types: 6 - 8 Standard drinks or equivalent per week   • Drug use: Never   • Sexual activity: None   Other Topics Concern   • None   Social History Narrative   • None     Social Determinants of Health     Financial Resource Strain: Not on file   Food Insecurity: Not on file   Transportation Needs: Not on file   Physical Activity: Not on file   Stress: Not on file   Social Connections: Not on file   Intimate Partner Violence: Not on file   Housing Stability: Not on file       Family History   Problem Relation Age of Onset   • Breast cancer Mother    • Cancer Father        Allergies   Allergen Reactions   • Lisinopril Cough         Current Outpatient Medications:   •  amLODIPine (NORVASC) 5 mg tablet, Take 5 mg by mouth daily, Disp: , Rfl:   •  celecoxib (CeleBREX) 200 mg capsule, Take 200 mg by mouth as needed, Disp: , Rfl:   •  cloNIDine (CATAPRES) 0 1 mg tablet, TAKE 1 TABLET BY MOUTH IN THE MORNING AND 1 IN THE EVENING, Disp: , Rfl:   •  co-enzyme Q-10 30 MG capsule, Take 30 mg by mouth Occasionally, Disp: , Rfl:   •  hydrochlorothiazide (HYDRODIURIL) 25 mg tablet, Take 25 mg by mouth daily, Disp: , Rfl:   •  metFORMIN (GLUCOPHAGE-XR) 500 mg 24 hr tablet, Take 500 mg by mouth every morning, Disp: , Rfl:   •  Na Sulfate-K Sulfate-Mg Sulf (Suprep Bowel Prep Kit) 17 5-3 13-1 6 GM/177ML SOLN, Follow office instructions, Disp: 177 mL, Rfl: 0  •  thyroid desiccated (ARMOUR) 30 mg tablet, Take 30 mg by mouth daily, Disp: , Rfl:   •  Nystatin 444896 units CAPS, Take 500,000 Units by mouth in the morning (Patient not taking: No sig reported), Disp: , Rfl:   •  Omega-3 Fatty Acids (FISH OIL) 1,000 mg, Take 1,000 mg by mouth daily (Patient not taking: No sig reported), Disp: , Rfl:   •  Red Yeast Rice Extract (RED YEAST RICE PO), Take by mouth (Patient not taking: No sig reported), Disp: , Rfl:       Physical Exam:  /88 (BP Location: Left arm, Patient Position: Sitting, Cuff Size: Standard)   Pulse 70   Temp 98 °F (36 7 °C) (Temporal)   Resp 16   Ht 5' 10" (1 778 m)   Wt 106 kg (233 lb)   SpO2 98%   BMI 33 43 kg/m²     Physical Exam  Vitals reviewed  Constitutional:       General: He is not in acute distress  Appearance: He is well-developed  HENT:      Head: Normocephalic and atraumatic  Eyes:      General: No scleral icterus  Conjunctiva/sclera: Conjunctivae normal    Cardiovascular:      Rate and Rhythm: Normal rate and regular rhythm  Heart sounds: Normal heart sounds  No murmur heard  Pulmonary:      Effort: Pulmonary effort is normal  No respiratory distress  Breath sounds: Normal breath sounds  Abdominal:      Palpations: Abdomen is soft  Tenderness: There is no abdominal tenderness  Musculoskeletal:         General: No tenderness  Normal range of motion        Cervical back: Normal range of motion and neck supple  Right lower leg: No edema  Left lower leg: No edema  Lymphadenopathy:      Cervical: No cervical adenopathy  Skin:     General: Skin is warm and dry  Neurological:      Mental Status: He is alert and oriented to person, place, and time  Cranial Nerves: No cranial nerve deficit  Psychiatric:         Mood and Affect: Mood normal          Behavior: Behavior normal        Labs:  Lab Results   Component Value Date    WBC 6 4 08/14/2021    HGB 16 2 08/14/2021    HCT 47 9 08/14/2021     8 (H) 08/14/2021     08/14/2021     Lab Results   Component Value Date    K 3 6 09/22/2022     09/22/2022    CO2 29 09/22/2022    BUN 17 09/22/2022    CREATININE 1 01 09/22/2022    CALCIUM 9 3 09/22/2022    AST 50 (H) 09/22/2022    ALT 57 (H) 09/22/2022    ALKPHOS 48 09/22/2022    EGFR 83 09/22/2022       Patient voiced understanding and agreement in the above discussion  Aware to contact our office with questions/symptoms in the interim  This note has been generated by voice recognition software system  Therefore, there may be spelling, grammar, and or syntax errors  Please contact if questions arise

## 2023-03-16 ENCOUNTER — OFFICE VISIT (OUTPATIENT)
Dept: OBGYN CLINIC | Facility: MEDICAL CENTER | Age: 68
End: 2023-03-16

## 2023-03-16 VITALS
HEART RATE: 75 BPM | HEIGHT: 70 IN | WEIGHT: 236 LBS | DIASTOLIC BLOOD PRESSURE: 73 MMHG | BODY MASS INDEX: 33.79 KG/M2 | SYSTOLIC BLOOD PRESSURE: 106 MMHG

## 2023-03-16 DIAGNOSIS — M25.552 LEFT HIP PAIN: ICD-10-CM

## 2023-03-16 DIAGNOSIS — M16.12 PRIMARY OSTEOARTHRITIS OF ONE HIP, LEFT: Primary | ICD-10-CM

## 2023-03-16 RX ORDER — ROSUVASTATIN CALCIUM 20 MG/1
20 TABLET, COATED ORAL
COMMUNITY
Start: 2023-02-07

## 2023-03-16 NOTE — PROGRESS NOTES
Hip New Office Note    Assessment:     1  Primary osteoarthritis of one hip, left    2  Left hip pain        Plan:     Problem List Items Addressed This Visit    None  Visit Diagnoses     Primary osteoarthritis of one hip, left    -  Primary    Left hip pain              Findings today are consistent with left hip osteoarthritis  Imaging and prognosis was reviewed with the patient today  Discussed conservative treatment with intra-articular cortisone injection versus surgical intervention  The patient has elected to proceed with left JACKELYN some time in late spring/early summer 2023  Risks and benefits of surgery to include but not limited to bleeding, infection, damage to surrounding structures, hardware failure, instability, fracture, dislocation, leg length inequality, need for further surgery, continued pain, stiffness, blood clots, stroke, heart attack was discussed with the patient  He will follow up in 2 months for further discussion  Subjective:     Patient ID: George Trent is a 79 y o  male  Chief Complaint:  HPI:  The patient presents with a chief complaint of left hip pain  The pain began several year(s) ago and is not associated with an acute injury  It is constant in timing, and localizes the pain to the lateral left hip and radiates down his lateral thigh to the left knee  The pain is worse with activities, standing, ascending stairs, descending stairs and weightbearing and relieved with rest  He has tried injections in the past but is unsure of the location  He notes that his left hip locks up on him and it takes him a few steps to get moving  He also notes some weakness      Allergy:  Allergies   Allergen Reactions   • Lisinopril Cough     Medications:  all current active meds have been reviewed  Past Medical History:  Past Medical History:   Diagnosis Date   • Abdominal pain    • Acute infection of external ear    • BPH with obstruction/lower urinary tract symptoms    • BPH without urinary obstruction 11/2013   • Chills (without fever)    • Chronic sinusitis    • Disease of thyroid gland    • Enlarged prostate    • Fatty liver    • Headache    • Hematuria    • Hypertension    • Joint pain    • Kidney stone    • Nausea and vomiting    • Retention of urine    • Streptococcal sore throat    • Ureteral calculi    • Wheezing      Past Surgical History:  Past Surgical History:   Procedure Laterality Date   • COLONOSCOPY  02/2010    by Dr Angulo-colon polyps   • COLONOSCOPY  12/28/2017    MIKE Roberts D  / multiple polyps removed, 1 5 cm polyp ascending colon-tubulovillous adenoma, 20 mm polyp descending colon and 2 other smaller polyps all tubular adenomas   • COLONOSCOPY  02/20/2019    MIKE Crews D  / 3 polyps-tubular adenomas, diverticulosis   • COLONOSCOPY  11/10/2022    8 polyps tubular adenomas, biopsy of ileocecal valve showed hyperplastic polyp by Dr Vladimir Bhatt   • CYSTOSCOPY  2013   • EYE SURGERY  2000   • IR BIOPSY BONE MARROW  09/23/2021     Family History:  Family History   Problem Relation Age of Onset   • Breast cancer Mother    • Cancer Father      Social History:  Social History     Substance and Sexual Activity   Alcohol Use Yes   • Alcohol/week: 6 0 standard drinks   • Types: 6 Standard drinks or equivalent per week     Social History     Substance and Sexual Activity   Drug Use Never     Social History     Tobacco Use   Smoking Status Never   Smokeless Tobacco Never           ROS:  General: Per HPI  Skin: Negative, except if noted below  HEENT: Negative  Respiratory: Negative  Cardiovascular: Negative  Gastrointestinal: Negative  Urinary: Negative  Vascular: Negative  Musculoskeletal: Positive per HPI   Neurologic: Positive per HPI  Endocrine: Negative    Objective:  BP Readings from Last 1 Encounters:   03/16/23 106/73      Wt Readings from Last 1 Encounters:   03/16/23 107 kg (236 lb)        Respiratory:   non-labored respirations    Lymphatics:  no palpable lymph nodes    Gait and Station:   Antalgic     Neurologic:   Alert and oriented times 3  Patient with normal sensation except as noted below  Deep tendon reflexes 2+ except as noted in MSK exam    Bilateral Lower Extremity:  Left Hip     Inspection: skin intact    Tender to palpation over trochanteric bursa    Range of Motion: limited with pain    Positive log roll    positive Trendelenburg sign    Motor: 4+/5 IP/Q/HS/TA/GS    Pulses: 2+ DP / 2+ PT    SILT DP/SP/S/S/TN    Right Hip     Inspection: skin intact    Range of Motion: full without pain    Negative log roll    Negative Trendelenburg sign    Motor: 5/5 IP/Q/HS/TA/GS    Pulses: 2+ DP / 2+ PT    SILT DP/SP/S/S/TN    Imaging:  My interpretation XR AP pelvis/ left hip: severe joint space narrowing, subchondral sclerosis, subchondral cysts, osteophyte formation  Lateral subluxation of hip  No fracture or dislocation  BMI:   Estimated body mass index is 33 86 kg/m² as calculated from the following:    Height as of this encounter: 5' 10" (1 778 m)  Weight as of this encounter: 107 kg (236 lb)  BSA:   Estimated body surface area is 2 24 meters squared as calculated from the following:    Height as of this encounter: 5' 10" (1 778 m)  Weight as of this encounter: 107 kg (236 lb)             Scribe Attestation    I,:  Yina Man am acting as a scribe while in the presence of the attending physician :       I,:  Mane You, DO personally performed the services described in this documentation    as scribed in my presence :

## 2023-03-20 ENCOUNTER — TELEPHONE (OUTPATIENT)
Dept: OBGYN CLINIC | Facility: CLINIC | Age: 68
End: 2023-03-20

## 2023-03-20 NOTE — TELEPHONE ENCOUNTER
Caller: Patient     Doctor: Sridevi Le     Reason for call: Patient calling to schedule surgery   Patient asking if he needs to come back in to be seen or if he can just schedule the surgery for the left hip over the phone ?      Call back#: [phone]

## 2023-04-06 ENCOUNTER — OFFICE VISIT (OUTPATIENT)
Dept: OBGYN CLINIC | Facility: MEDICAL CENTER | Age: 68
End: 2023-04-06

## 2023-04-06 VITALS
HEIGHT: 70 IN | DIASTOLIC BLOOD PRESSURE: 92 MMHG | BODY MASS INDEX: 34.22 KG/M2 | SYSTOLIC BLOOD PRESSURE: 133 MMHG | WEIGHT: 239 LBS | HEART RATE: 67 BPM

## 2023-04-06 DIAGNOSIS — M16.12 PRIMARY OSTEOARTHRITIS OF ONE HIP, LEFT: Primary | ICD-10-CM

## 2023-04-06 DIAGNOSIS — M19.09 PRIMARY OSTEOARTHRITIS, OTHER SPECIFIED SITE: ICD-10-CM

## 2023-04-06 DIAGNOSIS — M25.59 PAIN IN OTHER SPECIFIED JOINT: ICD-10-CM

## 2023-04-06 RX ORDER — CHLORHEXIDINE GLUCONATE 4 G/100ML
SOLUTION TOPICAL DAILY PRN
OUTPATIENT
Start: 2023-04-06

## 2023-04-06 RX ORDER — MULTIVIT-MIN/IRON FUM/FOLIC AC 7.5 MG-4
1 TABLET ORAL DAILY
Qty: 30 TABLET | Refills: 3 | Status: SHIPPED | OUTPATIENT
Start: 2023-04-06

## 2023-04-06 RX ORDER — ASCORBIC ACID 500 MG
500 TABLET ORAL 2 TIMES DAILY
Qty: 30 TABLET | Refills: 3 | Status: SHIPPED | OUTPATIENT
Start: 2023-04-06

## 2023-04-06 RX ORDER — GABAPENTIN 300 MG/1
300 CAPSULE ORAL ONCE
OUTPATIENT
Start: 2023-04-06 | End: 2023-04-06

## 2023-04-06 RX ORDER — SODIUM CHLORIDE, SODIUM LACTATE, POTASSIUM CHLORIDE, CALCIUM CHLORIDE 600; 310; 30; 20 MG/100ML; MG/100ML; MG/100ML; MG/100ML
125 INJECTION, SOLUTION INTRAVENOUS CONTINUOUS
OUTPATIENT
Start: 2023-04-06

## 2023-04-06 RX ORDER — ACETAMINOPHEN 325 MG/1
975 TABLET ORAL ONCE
OUTPATIENT
Start: 2023-04-06 | End: 2023-04-06

## 2023-04-06 RX ORDER — TRANEXAMIC ACID 10 MG/ML
1000 INJECTION, SOLUTION INTRAVENOUS ONCE
OUTPATIENT
Start: 2023-04-06 | End: 2023-04-06

## 2023-04-06 RX ORDER — MELATONIN
2000 DAILY
Qty: 60 TABLET | Refills: 1 | Status: SHIPPED | OUTPATIENT
Start: 2023-04-06

## 2023-04-06 RX ORDER — CHLORHEXIDINE GLUCONATE 0.12 MG/ML
15 RINSE ORAL ONCE
OUTPATIENT
Start: 2023-04-06 | End: 2023-04-06

## 2023-04-06 RX ORDER — CEFAZOLIN SODIUM 2 G/50ML
2000 SOLUTION INTRAVENOUS ONCE
OUTPATIENT
Start: 2023-04-06 | End: 2023-04-06

## 2023-04-06 RX ORDER — FOLIC ACID 1 MG/1
1 TABLET ORAL DAILY
Qty: 30 TABLET | Refills: 3 | Status: SHIPPED | OUTPATIENT
Start: 2023-04-06

## 2023-04-06 NOTE — PROGRESS NOTES
Hip New Office Note    Assessment:     1  Primary osteoarthritis of one hip, left        Plan:     Problem List Items Addressed This Visit    None  Visit Diagnoses     Primary osteoarthritis of one hip, left    -  Primary          Findings today are consistent with left hip osteoarthritis  Imaging and prognosis was reviewed with the patient today  He has failed conservative management in the form of HEP, low-impact exercises, NSAIDs and activity modifications  His pain is affecting his ADLs  The patient has elected to proceed with left JACKELYN through the posterior approach  Risks and benefits of surgery to include but not limited to bleeding, infection, damage to surrounding structures, hardware failure, instability, fracture, dislocation, leg length inequality, need for further surgery, continued pain, stiffness, blood clots, stroke, heart attack was discussed with the patient  Informed consent was signed today in the office  The patient has met with our surgical schedulers and our preoperative joint replacement pathway has been initiated  All questions were answered  Patient will follow-up 2 weeks post operatively  Patient is a nonsmoker and appropriate BMI  This is pending his work up for high blood pressure and abdominal aorta aneurysm which will be worked up prior to hip arthroplasty  Will require PCP and cardiology clearances  Subjective:     Patient ID: Marcellus Burkett is a 79 y o  male  Chief Complaint:  HPI:  79 y o  male presents to the office for follow up of left hip pain  He continues to experience lateral left hip pain that radiates down his lateral thigh to the left knee  His pain increases with activities such as standing, stairs, and weightbearing  His pain improves with rest  He has not had relief with injections in the past but is unsure of the location of the injection  His left hip does lock up on him and he does experience some weakness   He is currently being worked up for high blood pressure and aneurysm  Allergy:  Allergies   Allergen Reactions   • Lisinopril Cough     Medications:  all current active meds have been reviewed  Past Medical History:  Past Medical History:   Diagnosis Date   • Abdominal pain    • Acute infection of external ear    • BPH with obstruction/lower urinary tract symptoms    • BPH without urinary obstruction 11/2013   • Chills (without fever)    • Chronic sinusitis    • Disease of thyroid gland    • Enlarged prostate    • Fatty liver    • Headache    • Hematuria    • Hypertension    • Joint pain    • Kidney stone    • Nausea and vomiting    • Retention of urine    • Streptococcal sore throat    • Ureteral calculi    • Wheezing      Past Surgical History:  Past Surgical History:   Procedure Laterality Date   • COLONOSCOPY  02/2010    by Dr Angulo-colon polyps   • COLONOSCOPY  12/28/2017    MIKE Garsia  / multiple polyps removed, 1 5 cm polyp ascending colon-tubulovillous adenoma, 20 mm polyp descending colon and 2 other smaller polyps all tubular adenomas   • COLONOSCOPY  02/20/2019    MIKE John  / 3 polyps-tubular adenomas, diverticulosis   • COLONOSCOPY  11/10/2022    8 polyps tubular adenomas, biopsy of ileocecal valve showed hyperplastic polyp by Dr Sierra Speaker   • CYSTOSCOPY  2013   • EYE SURGERY  2000   • IR BIOPSY BONE MARROW  09/23/2021     Family History:  Family History   Problem Relation Age of Onset   • Breast cancer Mother    • Cancer Father      Social History:  Social History     Substance and Sexual Activity   Alcohol Use Yes   • Alcohol/week: 6 0 standard drinks   • Types: 6 Standard drinks or equivalent per week     Social History     Substance and Sexual Activity   Drug Use Never     Social History     Tobacco Use   Smoking Status Never   Smokeless Tobacco Never         ROS:  General: Per HPI  Skin: Negative, except if noted below  HEENT: Negative  Respiratory: Negative  Cardiovascular: Negative  Gastrointestinal: Negative  Urinary: "Negative  Vascular: Negative  Musculoskeletal: Positive per HPI   Neurologic: Positive per HPI  Endocrine: Negative    Objective:  BP Readings from Last 1 Encounters:   04/06/23 133/92      Wt Readings from Last 1 Encounters:   04/06/23 108 kg (239 lb)        Respiratory:   non-labored respirations    Lymphatics:  no palpable lymph nodes    Gait and Station:   antalgic    Neurologic:   Alert and oriented times 3  Patient with normal sensation except as noted below  Deep tendon reflexes 2+ except as noted in MSK exam    Bilateral Lower Extremity:  Left Hip     Inspection: skin intact    Tender to palpation over trochanteric bursa    Range of Motion: limited with pain    Positive log roll    positive Trendelenburg sign    Motor: 4+/5 IP/Q/HS/TA/GS    Pulses: 2+ DP / 2+ PT    SILT DP/SP/S/S/TN     Right Hip     Inspection: skin intact    Range of Motion: full without pain    Negative log roll    Negative Trendelenburg sign    Motor: 5/5 IP/Q/HS/TA/GS    Pulses: 2+ DP / 2+ PT    SILT DP/SP/S/S/TN     Imaging:  My interpretation XR AP pelvis/ left hip: severe joint space narrowing, subchondral sclerosis, subchondral cysts, osteophyte formation  Lateral subluxation of hip  No fracture or dislocation  BMI:   Estimated body mass index is 34 29 kg/m² as calculated from the following:    Height as of this encounter: 5' 10\" (1 778 m)  Weight as of this encounter: 108 kg (239 lb)  BSA:   Estimated body surface area is 2 25 meters squared as calculated from the following:    Height as of this encounter: 5' 10\" (1 778 m)  Weight as of this encounter: 108 kg (239 lb)  Scribe Attestation    I,:  Luis Winchester am acting as a scribe while in the presence of the attending physician :       I,:  Zari Garcia DO personally performed the services described in this documentation    as scribed in my presence  :         "

## 2023-04-17 PROBLEM — Z87.891 FORMER SMOKER: Status: ACTIVE | Noted: 2023-04-17

## 2023-04-17 PROBLEM — Z01.810 PREOPERATIVE CARDIOVASCULAR EXAMINATION: Status: ACTIVE | Noted: 2023-04-17

## 2023-04-17 PROBLEM — M16.12 OSTEOARTHRITIS OF LEFT HIP: Status: ACTIVE | Noted: 2023-04-17

## 2023-04-27 ENCOUNTER — DOCUMENTATION (OUTPATIENT)
Dept: CARDIOLOGY CLINIC | Facility: CLINIC | Age: 68
End: 2023-04-27

## 2023-04-27 ENCOUNTER — TELEPHONE (OUTPATIENT)
Dept: CARDIOLOGY CLINIC | Facility: CLINIC | Age: 68
End: 2023-04-27

## 2023-04-27 NOTE — TELEPHONE ENCOUNTER
Pc from Dr Hussein Milligan who saw patient and wanted to make Dr Bren Thompson aware that pt ordered CT urogram and was noted to have an aortic aneurysm 3 2 cm  X 3 2 and thought that Dr Everett Muñiz should know because of cardiac clearance he gave for  Upcoming hip sx  She will fax over copy of testing and will scan in chart so Dr Everett Muñiz can review  Pt is scheduled with [facility] CT sx  Per Dr Hussein Milligan

## 2023-05-04 DIAGNOSIS — M19.09 PRIMARY OSTEOARTHRITIS, OTHER SPECIFIED SITE: ICD-10-CM

## 2023-05-04 DIAGNOSIS — M16.12 PRIMARY OSTEOARTHRITIS OF ONE HIP, LEFT: Primary | ICD-10-CM

## 2023-05-04 DIAGNOSIS — E11.9 TYPE 2 DIABETES MELLITUS WITHOUT COMPLICATION, UNSPECIFIED WHETHER LONG TERM INSULIN USE (HCC): ICD-10-CM

## 2023-05-04 DIAGNOSIS — M25.59 PAIN IN OTHER SPECIFIED JOINT: ICD-10-CM

## 2023-05-18 ENCOUNTER — APPOINTMENT (OUTPATIENT)
Dept: LAB | Facility: IMAGING CENTER | Age: 68
End: 2023-05-18

## 2023-05-18 DIAGNOSIS — D47.2 MONOCLONAL GAMMOPATHY: ICD-10-CM

## 2023-05-18 DIAGNOSIS — I71.9 AORTIC ANEURYSM, UNSPECIFIED PORTION OF AORTA, UNSPECIFIED WHETHER RUPTURED (HCC): ICD-10-CM

## 2023-05-18 DIAGNOSIS — E11.9 TYPE 2 DIABETES MELLITUS WITHOUT COMPLICATION, UNSPECIFIED WHETHER LONG TERM INSULIN USE (HCC): ICD-10-CM

## 2023-05-18 DIAGNOSIS — E78.00 PURE HYPERCHOLESTEROLEMIA: ICD-10-CM

## 2023-05-18 DIAGNOSIS — I10 ESSENTIAL HYPERTENSION, MALIGNANT: ICD-10-CM

## 2023-05-18 DIAGNOSIS — R94.5 ABNORMAL RESULTS OF LIVER FUNCTION STUDIES: ICD-10-CM

## 2023-05-18 DIAGNOSIS — E03.9 HYPOTHYROIDISM, UNSPECIFIED TYPE: ICD-10-CM

## 2023-05-18 LAB
ALBUMIN SERPL BCP-MCNC: 3.9 G/DL (ref 3.5–5)
ALP SERPL-CCNC: 50 U/L (ref 46–116)
ALT SERPL W P-5'-P-CCNC: 48 U/L (ref 12–78)
ANION GAP SERPL CALCULATED.3IONS-SCNC: 4 MMOL/L (ref 4–13)
AST SERPL W P-5'-P-CCNC: 34 U/L (ref 5–45)
BASOPHILS # BLD AUTO: 0.03 THOUSANDS/ÂΜL (ref 0–0.1)
BASOPHILS NFR BLD AUTO: 1 % (ref 0–1)
BILIRUB SERPL-MCNC: 0.63 MG/DL (ref 0.2–1)
BUN SERPL-MCNC: 22 MG/DL (ref 5–25)
CALCIUM SERPL-MCNC: 9.5 MG/DL (ref 8.3–10.1)
CHLORIDE SERPL-SCNC: 106 MMOL/L (ref 96–108)
CHOLEST SERPL-MCNC: 118 MG/DL
CO2 SERPL-SCNC: 25 MMOL/L (ref 21–32)
CREAT SERPL-MCNC: 1.3 MG/DL (ref 0.6–1.3)
EOSINOPHIL # BLD AUTO: 0.16 THOUSAND/ÂΜL (ref 0–0.61)
EOSINOPHIL NFR BLD AUTO: 3 % (ref 0–6)
ERYTHROCYTE [DISTWIDTH] IN BLOOD BY AUTOMATED COUNT: 12.1 % (ref 11.6–15.1)
GFR SERPL CREATININE-BSD FRML MDRD: 56 ML/MIN/1.73SQ M
GLUCOSE P FAST SERPL-MCNC: 135 MG/DL (ref 65–99)
HCT VFR BLD AUTO: 45.2 % (ref 36.5–49.3)
HDLC SERPL-MCNC: 34 MG/DL
HGB BLD-MCNC: 15.2 G/DL (ref 12–17)
IMM GRANULOCYTES # BLD AUTO: 0.01 THOUSAND/UL (ref 0–0.2)
IMM GRANULOCYTES NFR BLD AUTO: 0 % (ref 0–2)
LDLC SERPL CALC-MCNC: 47 MG/DL (ref 0–100)
LYMPHOCYTES # BLD AUTO: 2.55 THOUSANDS/ÂΜL (ref 0.6–4.47)
LYMPHOCYTES NFR BLD AUTO: 43 % (ref 14–44)
MCH RBC QN AUTO: 33 PG (ref 26.8–34.3)
MCHC RBC AUTO-ENTMCNC: 33.6 G/DL (ref 31.4–37.4)
MCV RBC AUTO: 98 FL (ref 82–98)
MONOCYTES # BLD AUTO: 0.53 THOUSAND/ÂΜL (ref 0.17–1.22)
MONOCYTES NFR BLD AUTO: 9 % (ref 4–12)
NEUTROPHILS # BLD AUTO: 2.69 THOUSANDS/ÂΜL (ref 1.85–7.62)
NEUTS SEG NFR BLD AUTO: 44 % (ref 43–75)
NONHDLC SERPL-MCNC: 84 MG/DL
NRBC BLD AUTO-RTO: 0 /100 WBCS
PLATELET # BLD AUTO: 216 THOUSANDS/UL (ref 149–390)
PMV BLD AUTO: 11.6 FL (ref 8.9–12.7)
POTASSIUM SERPL-SCNC: 3.3 MMOL/L (ref 3.5–5.3)
PROT SERPL-MCNC: 8 G/DL (ref 6.4–8.4)
RBC # BLD AUTO: 4.61 MILLION/UL (ref 3.88–5.62)
SODIUM SERPL-SCNC: 135 MMOL/L (ref 135–147)
TRIGL SERPL-MCNC: 183 MG/DL
WBC # BLD AUTO: 5.97 THOUSAND/UL (ref 4.31–10.16)

## 2023-05-19 LAB
EST. AVERAGE GLUCOSE BLD GHB EST-MCNC: 166 MG/DL
HBA1C MFR BLD: 7.4 %

## 2023-05-25 ENCOUNTER — CONSULT (OUTPATIENT)
Dept: VASCULAR SURGERY | Facility: CLINIC | Age: 68
End: 2023-05-25

## 2023-05-25 VITALS
HEIGHT: 70 IN | HEART RATE: 60 BPM | WEIGHT: 228.4 LBS | BODY MASS INDEX: 32.7 KG/M2 | OXYGEN SATURATION: 92 % | SYSTOLIC BLOOD PRESSURE: 118 MMHG | DIASTOLIC BLOOD PRESSURE: 88 MMHG

## 2023-05-25 DIAGNOSIS — I71.43 INFRARENAL ABDOMINAL AORTIC ANEURYSM (AAA) WITHOUT RUPTURE (HCC): Primary | Chronic | ICD-10-CM

## 2023-05-25 PROBLEM — I71.40 AAA (ABDOMINAL AORTIC ANEURYSM) WITHOUT RUPTURE (HCC): Chronic | Status: ACTIVE | Noted: 2023-05-25

## 2023-05-25 NOTE — PATIENT INSTRUCTIONS
AAA (abdominal aortic aneurysm) without rupture (HCC)  Small, 3 2-3 4 cm infrarenal abdominal aortic aneurysm identified incidentally on recent CT scan  We discussed the pathophysiology of aneurysmal disease and the indications for further evaluation and treatment  At this point no further intervention is necessary other than periodic surveillance  He will be reevaluated with aortic duplex in 12 months

## 2023-05-25 NOTE — ASSESSMENT & PLAN NOTE
Small, 3 2-3 4 cm infrarenal abdominal aortic aneurysm identified incidentally on recent CT scan  We discussed the pathophysiology of aneurysmal disease and the indications for further evaluation and treatment  At this point no further intervention is necessary other than periodic surveillance  He will be reevaluated with aortic duplex in 12 months

## 2023-05-25 NOTE — PROGRESS NOTES
Assessment/Plan:    AAA (abdominal aortic aneurysm) without rupture (HCC)  Small, 3 2-3 4 cm infrarenal abdominal aortic aneurysm identified incidentally on recent CT scan  We discussed the pathophysiology of aneurysmal disease and the indications for further evaluation and treatment  At this point no further intervention is necessary other than periodic surveillance  He will be reevaluated with aortic duplex in 12 months  Diagnoses and all orders for this visit:    Infrarenal abdominal aortic aneurysm (AAA) without rupture (HCC)  -     VAS abdominal aorta/iliacs; complete study; Future          Subjective:      Patient ID: Mykel James is a 79 y o  male  Patient presents for review of CT Urogram done 03/02/2023 at [facility]  He denies abd pain, pain after eating, low back pain, BLE leg numbness, tingling, coldness or open wounds  He is taking Rosuvastatin 21mg      51-year-old recently underwent a CT scan for urologic reasons was incidentally noted to have a abdominal aortic aneurysm  He now presents for evaluation and treatment  He denies any close family history of aneurysmal disease other than a cousin  He denies any abdominal or back pain  His only limitation is regarding hip pain for which she is pending hip replacement  He specifically denies any chest pain, shortness of breath or claudication symptoms  CT scan 3/2/2023 shows infrarenal aneurysmal disease with maximum diameter on my measurement of 3 4 cm  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        The following portions of the patient's history were reviewed and updated as appropriate: allergies, current medications, past family history, past medical history, past social history, past surgical history and problem list     Past Medical History:  Past Medical History:   Diagnosis Date   • Abdominal pain    • Acute infection of external ear    • BPH with obstruction/lower urinary tract symptoms    • BPH without urinary obstruction 11/2013   • Chills (without fever)    • Chronic sinusitis    • Disease of thyroid gland    • Enlarged prostate    • Fatty liver    • Headache    • Hematuria    • Hypertension    • Joint pain    • Kidney stone    • Nausea and vomiting    • Retention of urine    • Streptococcal sore throat    • Ureteral calculi    • Wheezing        Past Surgical History:  Past Surgical History:   Procedure Laterality Date   • COLONOSCOPY  02/2010    by Dr Angulo-colon polyps   • COLONOSCOPY  12/28/2017    MIKE Krueger D  / multiple polyps removed, 1 5 cm polyp ascending colon-tubulovillous adenoma, 20 mm polyp descending colon and 2 other smaller polyps all tubular adenomas   • COLONOSCOPY  02/20/2019    MIKE Baer  / 3 polyps-tubular adenomas, diverticulosis   • COLONOSCOPY  11/10/2022    8 polyps tubular adenomas, biopsy of ileocecal valve showed hyperplastic polyp by Dr Alexsander Hoyt   • CYSTOSCOPY  2013   • EYE SURGERY  2000   • IR BIOPSY BONE MARROW  09/23/2021       Social History:  Social History     Substance and Sexual Activity   Alcohol Use Yes   • Alcohol/week: 6 0 standard drinks of alcohol   • Types: 6 Standard drinks or equivalent per week     Social History     Substance and Sexual Activity   Drug Use Never     Social History     Tobacco Use   Smoking Status Never   Smokeless Tobacco Never       Family History:  Family History   Problem Relation Age of Onset   • Breast cancer Mother    • Cancer Father        Allergies:   Allergies   Allergen Reactions   • Lisinopril Cough       Medications:    Current Outpatient Medications:   •  amLODIPine (NORVASC) 5 mg tablet, Take 5 mg by mouth daily, Disp: , Rfl:   •  ascorbic acid (VITAMIN C) 500 MG tablet, Take 1 tablet (500 mg total) by mouth 2 (two) times a day, Disp: 30 tablet, Rfl: 3  •  celecoxib (CeleBREX) 200 mg capsule, Take 200 mg by mouth as needed, Disp: , Rfl:   •  cholecalciferol (VITAMIN D3) 1,000 units tablet, Take 2 tablets (2,000 Units total) by mouth daily, Disp: 60 tablet, Rfl: 1  •  cloNIDine (CATAPRES) 0 1 mg tablet, TAKE 1 TABLET BY MOUTH IN THE MORNING AND 1 IN THE EVENING, Disp: , Rfl:   •  folic acid (FOLVITE) 1 mg tablet, Take 1 tablet (1 mg total) by mouth daily, Disp: 30 tablet, Rfl: 3  •  hydrochlorothiazide (HYDRODIURIL) 25 mg tablet, Take 25 mg by mouth daily, Disp: , Rfl:   •  metFORMIN (GLUCOPHAGE-XR) 500 mg 24 hr tablet, Take 500 mg by mouth every morning, Disp: , Rfl:   •  Multiple Vitamins-Minerals (multivitamin with minerals) tablet, Take 1 tablet by mouth daily, Disp: 30 tablet, Rfl: 3  •  Omega-3 Fatty Acids (FISH OIL) 1,000 mg, Take 1,000 mg by mouth daily, Disp: , Rfl:   •  rosuvastatin (CRESTOR) 20 MG tablet, Take 20 mg by mouth daily at bedtime, Disp: , Rfl:   •  thyroid desiccated (ARMOUR) 30 mg tablet, Take 30 mg by mouth daily, Disp: , Rfl:   •  co-enzyme Q-10 30 MG capsule, Take 30 mg by mouth Occasionally (Patient not taking: Reported on 2/7/2023), Disp: , Rfl:   •  Na Sulfate-K Sulfate-Mg Sulf (Suprep Bowel Prep Kit) 17 5-3 13-1 6 GM/177ML SOLN, Follow office instructions (Patient not taking: Reported on 2/7/2023), Disp: 177 mL, Rfl: 0  •  Nystatin 799228 units CAPS, Take 500,000 Units by mouth in the morning (Patient not taking: Reported on 10/25/2022), Disp: , Rfl:   •  Red Yeast Rice Extract (RED YEAST RICE PO), Take by mouth (Patient not taking: Reported on 10/18/2022), Disp: , Rfl:     Vitals:  /88 (05/25/23 1010)    Temp      Pulse 60 (05/25/23 1010)   Resp      SpO2 92 % (05/25/23 1010)      Lab Results and Cultures:   CBC with diff:   Lab Results   Component Value Date    HCT 45 2 05/18/2023    HGB 15 2 05/18/2023    MCH 33 0 05/18/2023    MCHC 33 6 05/18/2023    MCV 98 05/18/2023    MPV 11 6 05/18/2023    NRBC 0 05/18/2023     05/18/2023    RBC 4 61 05/18/2023    RDW 12 1 05/18/2023    WBC 5 97 05/18/2023   ,   BMP/CMP:  Lab Results   Component Value Date    ALKPHOS 50 05/18/2023    ALKPHOS 48 09/22/2022 "   ALT 48 05/18/2023    ALT 57 (H) 09/22/2022    AST 34 05/18/2023    AST 50 (H) 09/22/2022    BUN 22 05/18/2023    BUN 17 09/22/2022    CALCIUM 9 5 05/18/2023    CALCIUM 9 3 09/22/2022     05/18/2023     09/22/2022    CO2 25 05/18/2023    CO2 29 09/22/2022    CREATININE 1 30 05/18/2023    EGFR 56 05/18/2023    K 3 3 (L) 05/18/2023    K 3 6 09/22/2022   ,   Lipid Panel: No results found for: \"CHOL\",   Coags:   Lab Results   Component Value Date    INR 0 93 04/13/2023    PTT 26 04/13/2023   ,       Review of Systems   Constitutional: Negative  HENT: Negative  Eyes: Negative  Respiratory: Positive for shortness of breath  Cardiovascular: Negative  Gastrointestinal: Negative for abdominal pain  Endocrine: Negative  Genitourinary: Negative  Musculoskeletal: Positive for arthralgias  Skin: Negative  Allergic/Immunologic: Negative  Neurological: Negative  Hematological: Negative  Psychiatric/Behavioral: Negative  Objective:      /88 (BP Location: Left arm, Patient Position: Sitting, Cuff Size: Standard)   Pulse 60   Ht 5' 10\" (1 778 m)   Wt 104 kg (228 lb 6 4 oz)   SpO2 92%   BMI 32 77 kg/m²          Physical Exam  Constitutional:       Appearance: Normal appearance  He is well-developed  HENT:      Head: Normocephalic and atraumatic  Eyes:      Comments: Clouding of the right eye with 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   Neck:      Vascular: No carotid bruit or JVD  Cardiovascular:      Rate and Rhythm: Normal rate and regular rhythm  Pulses:           Carotid pulses are 2+ on the right side and 2+ on the left side  Radial pulses are 2+ on the right side and 2+ on the left side  Femoral pulses are 2+ on the right side and 2+ on the left side  Popliteal pulses are 2+ on the right side and 2+ on the left side  Dorsalis pedis pulses are 2+ on the right side and 2+ on the left side          Posterior tibial " pulses are 2+ on the right side and 2+ on the left side  Heart sounds: Normal heart sounds, S1 normal and S2 normal  No murmur heard  Pulmonary:      Effort: Pulmonary effort is normal       Breath sounds: Normal breath sounds  Abdominal:      General: There is no abdominal bruit  Palpations: Abdomen is soft  There is no pulsatile mass  Tenderness: There is no abdominal tenderness  Hernia: No hernia is present  Musculoskeletal:         General: No swelling, tenderness or deformity  Normal range of motion  Cervical back: Normal range of motion and neck supple  Skin:     General: Skin is warm and dry  Coloration: Skin is not pale  Neurological:      General: No focal deficit present  Mental Status: He is alert and oriented to person, place, and time  Sensory: No sensory deficit  Motor: No weakness     Psychiatric:         Mood and Affect: Mood normal          Speech: Speech normal          Behavior: Behavior normal

## 2023-05-25 NOTE — LETTER
May 25, 2023     [address], [address] [facility] [address]    Patient: Janell Juarez   YOB: 1955   Date of Visit: 5/25/2023       Dear Dr Felix Sanford: Thank you for referring Janell Juarez to me for evaluation  Below are the relevant portions of my assessment and plan of care  AAA (abdominal aortic aneurysm) without rupture (HCC)  Small, 3 2-3 4 cm infrarenal abdominal aortic aneurysm identified incidentally on recent CT scan  We discussed the pathophysiology of aneurysmal disease and the indications for further evaluation and treatment  At this point no further intervention is necessary other than periodic surveillance  He will be reevaluated with aortic duplex in 12 months  If you have questions, please do not hesitate to call me  I look forward to following Kita Hoff along with you           Sincerely,        Ferrell Olszewski, MD        CC: No Recipients

## 2023-05-31 ENCOUNTER — APPOINTMENT (OUTPATIENT)
Dept: LAB | Facility: IMAGING CENTER | Age: 68
End: 2023-05-31
Payer: MEDICARE

## 2023-05-31 DIAGNOSIS — E87.6 HYPOKALEMIA: ICD-10-CM

## 2023-05-31 DIAGNOSIS — E11.69 TYPE 2 DIABETES MELLITUS WITH OTHER SPECIFIED COMPLICATION, UNSPECIFIED WHETHER LONG TERM INSULIN USE (HCC): ICD-10-CM

## 2023-05-31 LAB
ANION GAP SERPL CALCULATED.3IONS-SCNC: 4 MMOL/L (ref 4–13)
BUN SERPL-MCNC: 15 MG/DL (ref 5–25)
CALCIUM SERPL-MCNC: 9.2 MG/DL (ref 8.3–10.1)
CHLORIDE SERPL-SCNC: 106 MMOL/L (ref 96–108)
CO2 SERPL-SCNC: 25 MMOL/L (ref 21–32)
CREAT SERPL-MCNC: 1.15 MG/DL (ref 0.6–1.3)
GFR SERPL CREATININE-BSD FRML MDRD: 65 ML/MIN/1.73SQ M
GLUCOSE P FAST SERPL-MCNC: 141 MG/DL (ref 65–99)
POTASSIUM SERPL-SCNC: 3.6 MMOL/L (ref 3.5–5.3)
SODIUM SERPL-SCNC: 135 MMOL/L (ref 135–147)

## 2023-05-31 PROCEDURE — 83036 HEMOGLOBIN GLYCOSYLATED A1C: CPT

## 2023-05-31 PROCEDURE — 80048 BASIC METABOLIC PNL TOTAL CA: CPT

## 2023-05-31 PROCEDURE — 36415 COLL VENOUS BLD VENIPUNCTURE: CPT

## 2023-06-01 LAB
EST. AVERAGE GLUCOSE BLD GHB EST-MCNC: 160 MG/DL
HBA1C MFR BLD: 7.2 %

## 2023-06-05 ENCOUNTER — EVALUATION (OUTPATIENT)
Dept: PHYSICAL THERAPY | Facility: REHABILITATION | Age: 68
End: 2023-06-05
Payer: MEDICARE

## 2023-06-05 DIAGNOSIS — M16.12 PRIMARY OSTEOARTHRITIS OF ONE HIP, LEFT: Primary | ICD-10-CM

## 2023-06-05 PROCEDURE — 97161 PT EVAL LOW COMPLEX 20 MIN: CPT | Performed by: PHYSICAL THERAPIST

## 2023-06-05 NOTE — PROGRESS NOTES
PT Evaluation     Today's date: 2023  Patient name: Halle Mas  : 1955  MRN: [id number]  Referring provider: Shruthi Eduardo PA-C  Dx:   Encounter Diagnosis     ICD-10-CM    1  Primary osteoarthritis of one hip, left  M16 12                      Assessment  Assessment details: Pt is a pleasant 79 y o  male presenting to outpatient physical therapy with Primary osteoarthritis of one hip, left  (primary encounter diagnosis) for pre-operative consultation appointment  Virtual Home Assessment reviewed, educated patient in gait and stair mechanics, provided transfer training, and answered questions regarding negotiating around the home  Patient issued HEP for immediate post-op  Patient scheduled for first post-operative physical therapy session 3 days following surgery  Pt would benefit from skilled physical therapy to address limitations and to achieve goals  Thank you for this referral    Impairments: abnormal coordination, abnormal gait, abnormal or restricted ROM, activity intolerance, impaired balance, impaired physical strength and pain with function  Understanding of Dx/Px/POC: good   Prognosis: good    Goals  ST  Patient will report 25% decrease in pain in 4 weeks  2  Patient will demonstrate 25% improvement in ROM in 4 weeks  3  Patient will demonstrate 1/2 grade improvement in strength in 4 weeks  4  Patient will be able to independently ambulate 250+ feet with SPC in 4 weeks  LT  Patient will be able to perform IADLS without restriction or pain by discharge  2  Patient will be independent in HEP by discharge  3  Patient will be able to return to recreational/work duties without restriction or pain by discharge  4  Patient will be able to ambulate community distances without compensation by discharge       Plan  Patient would benefit from: PT eval and skilled PT  Planned modality interventions: cryotherapy  Planned therapy interventions: IADL retraining, body mechanics training, flexibility, functional ROM exercises, home exercise program, neuromuscular re-education, manual therapy, postural training, strengthening, stretching, therapeutic activities, therapeutic exercise, gait training, transfer training, self care, patient education, balance/weight bearing training and ADL retraining  Frequency: 2x week  Duration in visits: 20  Duration in weeks: 12  Treatment plan discussed with: patient        Subjective Evaluation    History of Present Illness  Mechanism of injury: 06/05/23  Pt comes to therapy for pre-op visit for scheduled left JACKELYN (7/31/23)  States he has had ongoing hip pain for years, finally deciding to have surgery to remedy issues  Notes he lives alone in a single story home, with a full flight of steps to the basement, which he frequents  Reports he has a tub shower, with no chair or grab bars  No steps to enter the home  States he will have the ability to live with his sister nearby if needed, otherwise, plans to have his sister aid with transportation, groceries, etc    Notes his occupation is a  and [employer] which he plans to return to in approx 2-3 weeks following surgery  States his goals following surgery are to be able to function pain free and return to work     Patient Goals  Patient goals for therapy: decreased pain, increased motion, increased strength, independence with ADLs/IADLs and return to work          Objective           Precautions: HTN  **TOTAL HIP PRECAUTIONS**    Daily Treatment Diary    Date 6/5            FOTO nv            Re-Eval Pre-op               Manuals    PROM hip**                                                    Neuro Re-Ed                                                                                                Ther Ex                 Mini squats             Stand hip abd, ext             HR/TR             Standing gastroc stretch             Knee flex/ext stretch on step Quad sets              Heel slides             Bridges             SAQ                          Ther Activity    TM                          Gait Training                              Modalities    CP PRN

## 2023-07-10 PROBLEM — E66.09 CLASS 1 OBESITY DUE TO EXCESS CALORIES WITHOUT SERIOUS COMORBIDITY WITH BODY MASS INDEX (BMI) OF 32.0 TO 32.9 IN ADULT: Status: ACTIVE | Noted: 2023-07-10

## 2023-07-10 PROBLEM — E66.811 CLASS 1 OBESITY DUE TO EXCESS CALORIES WITHOUT SERIOUS COMORBIDITY WITH BODY MASS INDEX (BMI) OF 32.0 TO 32.9 IN ADULT: Status: ACTIVE | Noted: 2023-07-10

## 2023-07-10 RX ORDER — AMLODIPINE BESYLATE 10 MG/1
10 TABLET ORAL DAILY
COMMUNITY
Start: 2023-04-24

## 2023-07-10 RX ORDER — LEVOTHYROXINE, LIOTHYRONINE 38; 9 UG/1; UG/1
TABLET ORAL
COMMUNITY
Start: 2023-05-04

## 2023-07-10 NOTE — PATIENT INSTRUCTIONS
Contact surgical nurse  navigator with any questions regarding preoperative plan or schedule.   Stop all over the counter supplements, herbal, naturopathic  medications for 1 week prior to surgery UNLESS prescribed by your surgeon  Hold NSAIDS (i.e. advil, alleve, motrin, ibuprofen, celebrex) minimum 7 days prior to surgery  Hold Asprin minimum 7 days prior to surgery  Recommend using Tylenol ( acetaminophen ) 500mg every eight hours during the first week post discharge in conjunction with any additional pain medicine prescribed by your surgeon  Use bowel medicines prescribed by your surgeon ( colace) daily post op during the first 1-2 weeks to avoid post operative constipation issues  Call [phone] with any post discharge concerns or medical issues  The morning of surgery take only the following medication with small sip of water: levothyroxine/amlodipine/clonidine  Hold metformin the morning of surgery  Hold hctz and losartan the day prior to surgery and the morning of surgery

## 2023-07-10 NOTE — PROGRESS NOTES
Internal Medicine Pre-Operative Evaluation:     Reason for Visit: Pre-operative Evaluation for Risk Stratification and Optimization    Patient ID: Pato Schofield is a 79 y.o. male. Surgery: Arthroplasty of left Hip  Referring Provider: Dr Rod Randle      Recommendations to Proceed withSurgery    Patient is considered to be Low risk for Medium risk procedure. After evaluation and discussion with patient with emphasis that all surgery has some degree of inherent risk it is determined this procedure is of acceptable risk  medically. Patient may proceed with planned procedure      Assessment      Primary osteoarthritis left Hip  • Failed conservative measures  • Electing to undergo total joint arthroplasty    Pre-operative Medical Evaluation for planned surgery  • Patient meets preoperative quality goals as noted below  • Recommendations as listed in PLAN section below  • Contact surgical nurse  navigator with any questions regarding preoperative plan or schedule. HTN  • Stable  • Cont current medications as directed  • Monitor post operative BP   • Hold hctz the day prior to surgery and the morning of surgery  • OK to take amlodipine/clonidine the morning of surgery    DM  • Hgb A1c 6.3  • Hold metformin the morning of surgery  • Monitor post operative BS   • Continue ADA diet    BPH  · Monitor for retention post operatively    Hyperlipidemia  • Low cholesterol diet  • Continue statin therapy    Hypothyroid  • Cont levothyroxine as prescribed          Patient Active Problem List   Diagnosis   • BPH without obstruction/lower urinary tract symptoms   • Renal stones   • Microscopic hematuria   • Elevated LFTs   • Preoperative cardiovascular examination   • Osteoarthritis of left hip   • Former smoker   • AAA (abdominal aortic aneurysm) without rupture (HCC)   • Class 1 obesity due to excess calories without serious comorbidity with body mass index (BMI) of 32.0 to 32.9 in adult        Plan:     1.  Further preoperative workup as follows:   - none no further testing required may proceed with surgery    2. Medication Management/Recommendations:   - continue all current medicines through morning of surgery with sip of water except the following:  - hold diuretic / water pill  24 hours prior to surgery  - Hold the following oral diabetes medication morning of surgery: metformin  - hold aspirin 7 days prior to surgery  - avoid use of NSAID such as motrin, advil, aleve for 7 days prior to surgery  - hold all OTC herbal or nutritional supplements 7 days before surgery    3. Patient requires further consultation with:   No Consults Required    4. Discharge Planning / Barriers to Discharge  none identified - patients has post discharge therapy plan in place, transportation arranged for discharge day, adequate family support at home to assist with discharge to home. Subjective:           History of Present Illness:     Karen Jimenes is a 79 y.o. male who presents to the office today for a preoperative consultation at the request of surgeon. The patient understands this is an elective procedure and not emergent. They are electing to undergo planned procedure with an understanding that all surgery has inherent risk. They have worked with their surgeon and failed conservative treatment measures. Today they present for preoperative risk assessment and recommendations for optimization in preparation for surgery. Pt seen in surgical optimization center for upcoming proposed surgery. They have failed previous conservative measures and have elected surgical intervention. Pt meets presurgical lab and BMI optimization goals. Upon interview questioning patient is able to perform greater than 4 METs workload in daily life without any reported cardio-pulmonary symptoms. ROS: No TIA's or unusual headaches, no dysphagia. No prolonged cough. No dyspnea or chest pain on exertion.   No abdominal pain, change in bowel habits, black or bloody stools. No urinary tract or BPH symptoms. Positive reported pain in arthritic joint. Positive difficulty with gait. No skin rashes or issues. Objective: There were no vitals taken for this visit. General Appearance: no distress, conversive  HEENT: PERRLA, conjuctiva normal; oropharynx clear; mucous membranes moist;   Neck:  Supple, no lymphadenopathy or thyromegaly  Lungs: breath sounds normal, normal respiratory effort, no retractions, expiratory effort normal  CV: normal heart sounds S1/S2, PMI normal   ABD: soft non tender, no masses , no hepatic or splenomegaly  EXT: DP pulses intact, no lymphadenopathy, no edema  Skin: normal turgor, normal texture, no rash  Psych: affect normal, mood normal  Neuro: AAOx3        The following portions of the patient's history were reviewed and updated as appropriate: allergies, current medications, past family history, past medical history, past social history, past surgical history and problem list.     Past History:       Past Medical History:   Diagnosis Date   • Abdominal pain    • Acute infection of external ear    • BPH with obstruction/lower urinary tract symptoms    • BPH without urinary obstruction 11/2013   • Chills (without fever)    • Chronic sinusitis    • Diabetes mellitus ([address])    • Disease of thyroid gland    • Enlarged prostate    • Fatty liver    • Headache    • Hematuria    • Hypertension    • Joint pain    • Kidney stone    • Nausea and vomiting    • Retention of urine    • Streptococcal sore throat    • Ureteral calculi    • Wheezing     Past Surgical History:   Procedure Laterality Date   • COLONOSCOPY  02/2010    by -colon polyps   • COLONOSCOPY  12/28/2017    Anupam Valencia M.D. / multiple polyps removed, 1.5 cm polyp ascending colon-tubulovillous adenoma, 20 mm polyp descending colon and 2 other smaller polyps all tubular adenomas   • COLONOSCOPY  02/20/2019    Vivi Dahl M.D. / 3 polyps-tubular adenomas, diverticulosis   • COLONOSCOPY  11/10/2022    8 polyps tubular adenomas, biopsy of ileocecal valve showed hyperplastic polyp by Dr. Thai Hyatt   • CYSTOSCOPY  2013   • EYE SURGERY  2000   • IR BIOPSY BONE MARROW  09/23/2021   • KNEE ARTHROSCOPY Bilateral           Social History     Tobacco Use   • Smoking status: Never   • Smokeless tobacco: Never   Vaping Use   • Vaping Use: Never used   Substance Use Topics   • Alcohol use: Yes     Alcohol/week: 6.0 standard drinks of alcohol     Types: 6 Standard drinks or equivalent per week   • Drug use: Never     Family History   Problem Relation Age of Onset   • Breast cancer Mother    • Cancer Father           Allergies: Allergies   Allergen Reactions   • Lisinopril Cough        Current Medications:     Current Outpatient Medications   Medication Instructions   • amLODIPine (NORVASC) 10 mg, Oral, Daily   • ascorbic acid (VITAMIN C) 500 mg, Oral, 2 times daily   • celecoxib (CELEBREX) 200 mg, Oral, As needed   • cholecalciferol (VITAMIN D3) 2,000 Units, Oral, Daily   • cloNIDine (CATAPRES) 0.1 mg, Oral, Every 12 hours scheduled   • fish oil 1,000 mg, Daily   • folic acid (FOLVITE) 1 mg, Oral, Daily   • hydrochlorothiazide (HYDRODIURIL) 25 mg, Oral, Daily   • metFORMIN (GLUCOPHAGE-XR) 500 mg, Oral, Every morning   • Multiple Vitamins-Minerals (multivitamin with minerals) tablet 1 tablet, Oral, Daily   • NP Thyroid 60 MG tablet TAKE 1 TABLET BY MOUTH ONCE DAILY IN THE MORNING FIRST THING IN IN THE MORNING WITH A GLASS OF WATER-NOTHING TO EAT OR DRINK FOR 30 MINUTES AFTERWARDS   • rosuvastatin (CRESTOR) 20 mg, Oral, Daily at bedtime             PRE-OP WORKSHEET DATA    Assessment of Pre-Operative Risks     MLJ Quality Hard Stops:  BMI (<40) : Estimated body mass index is 31.85 kg/m² as calculated from the following:    Height as of 7/13/23: 5' 10" (1.778 m). Weight as of 7/13/23: 101 kg (222 lb). Hgb ( >11):    Lab Results   Component Value Date HGB 14.9 07/13/2023     HbA1c (<7.0) :   Lab Results   Component Value Date    HGBA1C 6.3 (H) 07/13/2023     GFR (>60) (Less then 45 = Nephrology consult):  Estimated Creatinine Clearance: 75.3 mL/min (by C-G formula based on SCr of 1.15 mg/dL). Active Decompensated Chronic Conditions which would delay surgery  No acutely decompensated medical issues such as recent CVA, MI, new onset arrhythmia, severe aortic stenosis, CHF, uncontrolled COPD       Exercise Capacity: (if less the 4 mets consider functional assessment via cardiac stress testing or consultation)    · Able to walk 2 blocks without symptoms?: Yes  · Able to walk 1 flights without symptoms?: Yes    Assessment of intra and post operative respiratory, hemodynamic and thrombotic risks     Prior Anesthesia Reactions: No     Personal history of venous thromboembolic disease? No    History of steroid use > 5 mg for >2 weeks within last year? No    Cardiac Risk Estimation: per the Revised Cardiac Risk Index (Circ. 100:1043, 1999),     The patient's risk factors for cardiac complications include :  none    Fern Smith has a in hospital cardiac risk of RCI RISK CLASS I (0 risk factors, risk of major cardiac compl. appr. 0.5%) based on RCRI calculator          Pre-Op Data Reviewed:       Laboratory Results: I have personally reviewed the pertinent laboratory results/reports     EKG:I have personally reviewed pertinent reports. . I personally reviewed and interpreted available tracings in the electronic medical record    4/20/2023=Normal sinus rhythm at a rate of 75 bpm.  Normal EKG    OLD RECORDS: reviewed old records in the chart review section if EHR on day of visit.     Previous cardiopulmonary studies within the past year:  · Echocardiogram: yes 2022 ef 83-92% grade 1 diastolic  · Cardiac Catheterization: no  · Stress Test: no      Time of visit including pre-visit chart review, visit and post-visit coordination of plan and care , review of pre-surgical lab work, preparation and time spent documenting note in [address] record, time spent face-to-face in physical examination answering patient questions by care team 55 minutes             [address] [facility]

## 2023-07-13 ENCOUNTER — APPOINTMENT (OUTPATIENT)
Dept: LAB | Facility: IMAGING CENTER | Age: 68
End: 2023-07-13
Payer: MEDICARE

## 2023-07-13 ENCOUNTER — TELEPHONE (OUTPATIENT)
Dept: OBGYN CLINIC | Facility: HOSPITAL | Age: 68
End: 2023-07-13

## 2023-07-13 DIAGNOSIS — M16.12 PRIMARY OSTEOARTHRITIS OF ONE HIP, LEFT: ICD-10-CM

## 2023-07-13 DIAGNOSIS — M19.09 PRIMARY OSTEOARTHRITIS, OTHER SPECIFIED SITE: ICD-10-CM

## 2023-07-13 DIAGNOSIS — M25.59 PAIN IN OTHER SPECIFIED JOINT: ICD-10-CM

## 2023-07-13 DIAGNOSIS — Z91.89 AT RISK FOR SLEEP APNEA: Primary | ICD-10-CM

## 2023-07-13 DIAGNOSIS — Z01.818 PREOP TESTING: ICD-10-CM

## 2023-07-13 DIAGNOSIS — E11.9 TYPE 2 DIABETES MELLITUS WITHOUT COMPLICATION, UNSPECIFIED WHETHER LONG TERM INSULIN USE (HCC): ICD-10-CM

## 2023-07-13 LAB
ALBUMIN SERPL BCP-MCNC: 3.7 G/DL (ref 3.5–5)
ALP SERPL-CCNC: 50 U/L (ref 46–116)
ALT SERPL W P-5'-P-CCNC: 41 U/L (ref 12–78)
ANION GAP SERPL CALCULATED.3IONS-SCNC: 7 MMOL/L
APTT PPP: 27 SECONDS (ref 23–37)
AST SERPL W P-5'-P-CCNC: 31 U/L (ref 5–45)
BASOPHILS # BLD AUTO: 0.03 THOUSANDS/ÂΜL (ref 0–0.1)
BASOPHILS NFR BLD AUTO: 0 % (ref 0–1)
BILIRUB SERPL-MCNC: 0.5 MG/DL (ref 0.2–1)
BUN SERPL-MCNC: 20 MG/DL (ref 5–25)
CALCIUM SERPL-MCNC: 9.3 MG/DL (ref 8.3–10.1)
CHLORIDE SERPL-SCNC: 108 MMOL/L (ref 96–108)
CO2 SERPL-SCNC: 24 MMOL/L (ref 21–32)
CREAT SERPL-MCNC: 1.15 MG/DL (ref 0.6–1.3)
EOSINOPHIL # BLD AUTO: 0.15 THOUSAND/ÂΜL (ref 0–0.61)
EOSINOPHIL NFR BLD AUTO: 2 % (ref 0–6)
ERYTHROCYTE [DISTWIDTH] IN BLOOD BY AUTOMATED COUNT: 12.4 % (ref 11.6–15.1)
EST. AVERAGE GLUCOSE BLD GHB EST-MCNC: 134 MG/DL
GFR SERPL CREATININE-BSD FRML MDRD: 65 ML/MIN/1.73SQ M
GLUCOSE SERPL-MCNC: 110 MG/DL (ref 65–140)
HBA1C MFR BLD: 6.3 %
HCT VFR BLD AUTO: 43.4 % (ref 36.5–49.3)
HGB BLD-MCNC: 14.9 G/DL (ref 12–17)
IMM GRANULOCYTES # BLD AUTO: 0.01 THOUSAND/UL (ref 0–0.2)
IMM GRANULOCYTES NFR BLD AUTO: 0 % (ref 0–2)
INR PPP: 0.98 (ref 0.84–1.19)
LYMPHOCYTES # BLD AUTO: 2.74 THOUSANDS/ÂΜL (ref 0.6–4.47)
LYMPHOCYTES NFR BLD AUTO: 41 % (ref 14–44)
MCH RBC QN AUTO: 33.6 PG (ref 26.8–34.3)
MCHC RBC AUTO-ENTMCNC: 34.3 G/DL (ref 31.4–37.4)
MCV RBC AUTO: 98 FL (ref 82–98)
MONOCYTES # BLD AUTO: 0.61 THOUSAND/ÂΜL (ref 0.17–1.22)
MONOCYTES NFR BLD AUTO: 9 % (ref 4–12)
NEUTROPHILS # BLD AUTO: 3.19 THOUSANDS/ÂΜL (ref 1.85–7.62)
NEUTS SEG NFR BLD AUTO: 48 % (ref 43–75)
NRBC BLD AUTO-RTO: 0 /100 WBCS
PLATELET # BLD AUTO: 221 THOUSANDS/UL (ref 149–390)
PMV BLD AUTO: 11.4 FL (ref 8.9–12.7)
POTASSIUM SERPL-SCNC: 4 MMOL/L (ref 3.5–5.3)
PROT SERPL-MCNC: 7.7 G/DL (ref 6.4–8.4)
PROTHROMBIN TIME: 13.2 SECONDS (ref 11.6–14.5)
RBC # BLD AUTO: 4.43 MILLION/UL (ref 3.88–5.62)
SODIUM SERPL-SCNC: 139 MMOL/L (ref 135–147)
WBC # BLD AUTO: 6.73 THOUSAND/UL (ref 4.31–10.16)

## 2023-07-13 PROCEDURE — 36415 COLL VENOUS BLD VENIPUNCTURE: CPT

## 2023-07-13 PROCEDURE — 86900 BLOOD TYPING SEROLOGIC ABO: CPT | Performed by: PHYSICIAN ASSISTANT

## 2023-07-13 PROCEDURE — 86901 BLOOD TYPING SEROLOGIC RH(D): CPT | Performed by: PHYSICIAN ASSISTANT

## 2023-07-13 PROCEDURE — 80053 COMPREHEN METABOLIC PANEL: CPT

## 2023-07-13 PROCEDURE — 85610 PROTHROMBIN TIME: CPT

## 2023-07-13 PROCEDURE — 85730 THROMBOPLASTIN TIME PARTIAL: CPT

## 2023-07-13 PROCEDURE — 86850 RBC ANTIBODY SCREEN: CPT | Performed by: PHYSICIAN ASSISTANT

## 2023-07-13 PROCEDURE — 83036 HEMOGLOBIN GLYCOSYLATED A1C: CPT

## 2023-07-13 PROCEDURE — 85025 COMPLETE CBC W/AUTO DIFF WBC: CPT

## 2023-07-13 NOTE — PRE-PROCEDURE INSTRUCTIONS
Pre-Surgery Instructions:   Medication Instructions   • amLODIPine (NORVASC) 10 mg tablet Take day of surgery. • ascorbic acid (VITAMIN C) 500 MG tablet Hold day of surgery. • celecoxib (CeleBREX) 200 mg capsule Stop taking 7 days prior to surgery. • cholecalciferol (VITAMIN D3) 1,000 units tablet Hold day of surgery. • cloNIDine (CATAPRES) 0.1 mg tablet Take day of surgery. • folic acid (FOLVITE) 1 mg tablet Hold day of surgery. • hydrochlorothiazide (HYDRODIURIL) 25 mg tablet Hold day of surgery. • metFORMIN (GLUCOPHAGE-XR) 500 mg 24 hr tablet Hold day of surgery. • Multiple Vitamins-Minerals (multivitamin with minerals) tablet Hold day of surgery. • NP Thyroid 60 MG tablet Take day of surgery. • rosuvastatin (CRESTOR) 20 MG tablet Take night before surgery   See above    . Medication instructions for day surgery reviewed. Please use only a sip of water to take your instructed medications. Avoid all over the counter vitamins, supplements and NSAIDS for one week prior to surgery per anesthesia guidelines. Tylenol is ok to take as needed. You will receive a call one business day prior to surgery with an arrival time and hospital directions. If your surgery is scheduled on a Monday, the hospital will be calling you on the Friday prior to your surgery. If you have not heard from anyone by 8pm, please call the hospital supervisor through the hospital  at [phone]. [employer] [phone]). Do not eat or drink anything after midnight the night before your surgery, including candy, mints, lifesavers, or chewing gum. Do not drink alcohol 24hrs before your surgery. Try not to smoke at least 24hrs before your surgery. Follow the pre surgery showering instructions as listed in the [facility] Surgical Experience Booklet” or otherwise provided by your surgeon's office. Do not shave the surgical area 24 hours before surgery.  Do not apply any lotions, creams, including makeup, cologne, deodorant, or perfumes after showering on the day of your surgery. No contact lenses, eye make-up, or artificial eyelashes. Remove nail polish, including gel polish, and any artificial, gel, or acrylic nails if possible. Remove all jewelry including rings and body piercing jewelry. Wear causal clothing that is easy to take on and off. Consider your type of surgery. Keep any valuables, jewelry, piercings at home. Please bring any specially ordered equipment (sling, braces) if indicated. Arrange for a responsible person to drive you to and from the hospital on the day of your surgery. Visitor Guidelines discussed. Call the surgeon's office with any new illnesses, exposures, or additional questions prior to surgery. Please reference your [facility] Surgical Experience Booklet” for additional information to prepare for your upcoming surgery.

## 2023-07-14 ENCOUNTER — ANESTHESIA EVENT (OUTPATIENT)
Dept: PERIOP | Facility: HOSPITAL | Age: 68
End: 2023-07-14
Payer: MEDICARE

## 2023-07-14 ENCOUNTER — LAB REQUISITION (OUTPATIENT)
Dept: LAB | Facility: HOSPITAL | Age: 68
End: 2023-07-14
Payer: MEDICARE

## 2023-07-14 DIAGNOSIS — Z01.818 ENCOUNTER FOR OTHER PREPROCEDURAL EXAMINATION: ICD-10-CM

## 2023-07-14 LAB
ABO GROUP BLD: NORMAL
BLD GP AB SCN SERPL QL: NEGATIVE
RH BLD: POSITIVE
SPECIMEN EXPIRATION DATE: NORMAL

## 2023-07-19 ENCOUNTER — OFFICE VISIT (OUTPATIENT)
Age: 68
End: 2023-07-19
Payer: MEDICARE

## 2023-07-19 VITALS
BODY MASS INDEX: 32.18 KG/M2 | SYSTOLIC BLOOD PRESSURE: 130 MMHG | HEART RATE: 56 BPM | DIASTOLIC BLOOD PRESSURE: 85 MMHG | HEIGHT: 70 IN | WEIGHT: 224.8 LBS

## 2023-07-19 DIAGNOSIS — N20.0 RENAL STONES: ICD-10-CM

## 2023-07-19 DIAGNOSIS — N40.0 BPH WITHOUT OBSTRUCTION/LOWER URINARY TRACT SYMPTOMS: ICD-10-CM

## 2023-07-19 DIAGNOSIS — E66.09 CLASS 1 OBESITY DUE TO EXCESS CALORIES WITHOUT SERIOUS COMORBIDITY WITH BODY MASS INDEX (BMI) OF 32.0 TO 32.9 IN ADULT: ICD-10-CM

## 2023-07-19 DIAGNOSIS — I71.43 INFRARENAL ABDOMINAL AORTIC ANEURYSM (AAA) WITHOUT RUPTURE (HCC): Primary | Chronic | ICD-10-CM

## 2023-07-19 DIAGNOSIS — M16.12 PRIMARY OSTEOARTHRITIS OF LEFT HIP: ICD-10-CM

## 2023-07-19 DIAGNOSIS — M16.12 PRIMARY OSTEOARTHRITIS OF ONE HIP, LEFT: ICD-10-CM

## 2023-07-19 PROCEDURE — 99205 OFFICE O/P NEW HI 60 MIN: CPT | Performed by: INTERNAL MEDICINE

## 2023-07-19 RX ORDER — LOSARTAN POTASSIUM 50 MG/1
50 TABLET ORAL DAILY
COMMUNITY
Start: 2023-06-06

## 2023-07-21 LAB
DME PARACHUTE DELIVERY DATE REQUESTED: NORMAL
DME PARACHUTE ITEM DESCRIPTION: NORMAL
DME PARACHUTE ORDER STATUS: NORMAL
DME PARACHUTE SUPPLIER NAME: NORMAL
DME PARACHUTE SUPPLIER PHONE: NORMAL

## 2023-07-21 NOTE — TELEPHONE ENCOUNTER
Preoperative Elective Admission Assessment- spoke to patient    Living Situation:  Patients home- see support section for patients postoperative plans to return to [facility]. Who does pt live with: lives alone  What kind of home: ranch  How do they enter the home: front  How many levels in home: 1   # of steps to enter home: 0 to enter   Sleeping arrangement: first/entry floor  Where is Bathroom: Step in tub and walk in shower      First Floor Setup:   Is there a bathroom: Yes  Where would pt sleep: bed     DME: Denies DME - ordering RW   We discussed clearing pathways in the home and making sure there is accessibly to use the walker, for example, removing throw rugs. Patient's Current Level of Function: Ambulates: Independently and ADLs: Independent    Post-op Caregiver: relative  Caregiver Name and phone number for Inpatient discharge needs: Pt plans to stay at [facility] postop- she also has a ranch home with a walk in shower. No steps to enter   Currently receive any HHC/aides/community supports: No     Post-op Transport: relative  To/from hospital: relative  To/from PT 2-3x/week: relative  Uses community transport now: No     Outpatient Physical Therapy Site:  Site: [place]   pre and post-op appts scheduled? Yes     Medication Management: self  Preferred Pharmacy for Post-op Medications: [facility] [address] [facility]  Blood Management Vitamin Regimen: Pt confirms taking as prescribed  Post-op anticoagulant: to be determined by surgical team postoperatively     DC Plan: Pt plans to be discharged home    Barriers to DC identified preoperatively: none identified    BMI: 32.26     Patient Education:  Pt educated on post-op pain, early mobilization (POD0), LOS goals, OP PT goals, and preoperative bathing. Patient educated that our goal is to appropriately discharge patient based off their post-op function while striving to maintain maximal independence.  The goal is to discharge patient to home and for them to attend outpatient physical therapy.     Assigned to care team? Yes

## 2023-07-25 DIAGNOSIS — M16.12 PRIMARY OSTEOARTHRITIS OF ONE HIP, LEFT: ICD-10-CM

## 2023-07-25 LAB
DME PARACHUTE DELIVERY DATE ACTUAL: NORMAL
DME PARACHUTE DELIVERY DATE REQUESTED: NORMAL
DME PARACHUTE ITEM DESCRIPTION: NORMAL
DME PARACHUTE ORDER STATUS: NORMAL
DME PARACHUTE SUPPLIER NAME: NORMAL
DME PARACHUTE SUPPLIER PHONE: NORMAL

## 2023-07-25 RX ORDER — FOLIC ACID 1 MG/1
1000 TABLET ORAL DAILY
Qty: 30 TABLET | Refills: 0 | OUTPATIENT
Start: 2023-07-25

## 2023-07-28 RX ORDER — FENTANYL CITRATE/PF 50 MCG/ML
50 SYRINGE (ML) INJECTION
OUTPATIENT
Start: 2023-07-28

## 2023-07-28 RX ORDER — ONDANSETRON 2 MG/ML
4 INJECTION INTRAMUSCULAR; INTRAVENOUS ONCE AS NEEDED
OUTPATIENT
Start: 2023-07-28

## 2023-07-30 NOTE — DISCHARGE INSTR - AVS FIRST PAGE
Dr. Peter Cintron Hip Replacement    What to Expect/Activity  You are weight bearing as tolerated to your operative leg unless otherwise instructed. Use your walker or crutches. It is important to get up and walk for 10 minutes every hour, if possible. Initial recovery therapy is focused on walking. If in your follow-up a referral to formal physical therapy is required, this will be provided based on your recovery. You may sleep however you would like. Many patients feel more comfortable with a pillow between their legs and find it uncomfortably to lay on the operative side. If you do roll on that side at night you will not damage the replacement. You may use a regular or elevated toilet seat, whichever is more comfortable. We do not routinely require any specific precautions in terms of positioning of your leg and if so this will be taught to you by the physical therapists at the hospital. This means that you can dress as you are comfortable, including shoes and socks. You can bend down carefully to get items from the floor. Swelling and discomfort causes pain. Elevate your surgical leg on 1-2 pillows placed behind your ankle/calf throughout the day, especially when you are laying down. Please use ice packs for 20-30 minutes every 1-2 hours for 48-72 hours on the operative hip. Please make sure there is a barrier directly between your skin and your ice/ice pack. Please use incentive spirometer 10 times per hour while awake (see diagram below). Dressing/Wound Care/Bathing  There is a surgical dressing over your incision that stays in place for 7 days after surgery. You may start showering 24 hours after surgery, the surgical dressing will remain in place. Please pat the dressing dry. If you notice the dressing appears saturated or is starting to come off, please replace with a dry dressing. Keep the dressing on until your follow-up in the office. There may be dried blood around the incision.  It is ok to continue showering after removing the dressing but do not scrub the incision. Pat incision dry. Do not place any creams, ointments or gels on or around the incision. No baths, swimming or submerging until cleared by Dr. Jeronimo Barlow. Pain Management/Medications  You may resume your usual medications. Please take the following medications:  Anti-coagulation (blood clot prevention) - aspirin 81mg twice daily for 4 weeks  Pain medication:  Narcotic Medication: Take as directed  NSAID (Anti-inflammatory): Take as directed  Tylenol 1000mg every 8 hours  Zofran (ondasetron) - 4mg every 8 hours as needed for nausea  Stool Softeners (senna/colace)- take daily to help prevent constipation as narcotic pain medication causes constipation  Antibiotic - take as directed if prescribed  If you have questions or pain concerns, please contact the office. Pain medication cannot remove all post-operative pain. Follow up/Call if:  The findings of your surgery will be explained to you and your family immediately after surgery. However, in the post-operative period, during recovery from anesthesia you may not fully remember or fully understand what was said. We will go over this again when you return for your post-op appointment.   Please contact Dr. Carmen Arias office if you experience the following:  Excessive bleeding (bleeding through your dressing)  Fever greater than 101 degrees F after the first 2 days (a slight fever is normal the first few days after surgery)  Persistent nausea or vomiting  Decreased sensation or discoloration of the operative limb  Pain or swelling that is getting worse and not better with medication    Dr. Epstein Ace: [phone]

## 2023-07-31 ENCOUNTER — HOSPITAL ENCOUNTER (OUTPATIENT)
Facility: HOSPITAL | Age: 68
Setting detail: OUTPATIENT SURGERY
Discharge: HOME | End: 2023-07-31
Attending: STUDENT IN AN ORGANIZED HEALTH CARE EDUCATION/TRAINING PROGRAM | Admitting: STUDENT IN AN ORGANIZED HEALTH CARE EDUCATION/TRAINING PROGRAM
Payer: MEDICARE

## 2023-07-31 ENCOUNTER — ANESTHESIA (OUTPATIENT)
Dept: PERIOP | Facility: HOSPITAL | Age: 68
End: 2023-07-31
Payer: MEDICARE

## 2023-07-31 VITALS
WEIGHT: 226.85 LBS | BODY MASS INDEX: 32.48 KG/M2 | HEART RATE: 69 BPM | DIASTOLIC BLOOD PRESSURE: 73 MMHG | HEIGHT: 70 IN | TEMPERATURE: 97.5 F | SYSTOLIC BLOOD PRESSURE: 111 MMHG | RESPIRATION RATE: 18 BRPM | OXYGEN SATURATION: 95 %

## 2023-07-31 DIAGNOSIS — L02.91 ABSCESS: Primary | ICD-10-CM

## 2023-07-31 LAB — GLUCOSE SERPL-MCNC: 152 MG/DL (ref 65–140)

## 2023-07-31 PROCEDURE — 82948 REAGENT STRIP/BLOOD GLUCOSE: CPT

## 2023-07-31 RX ORDER — DOXYCYCLINE HYCLATE 100 MG/1
100 CAPSULE ORAL EVERY 12 HOURS SCHEDULED
Qty: 28 CAPSULE | Refills: 0 | Status: SHIPPED | OUTPATIENT
Start: 2023-07-31 | End: 2023-08-14

## 2023-07-31 RX ORDER — TRANEXAMIC ACID 10 MG/ML
1000 INJECTION, SOLUTION INTRAVENOUS ONCE
Status: DISCONTINUED | OUTPATIENT
Start: 2023-07-31 | End: 2023-07-31 | Stop reason: HOSPADM

## 2023-07-31 RX ORDER — CHLORHEXIDINE GLUCONATE 4 G/100ML
SOLUTION TOPICAL DAILY PRN
Status: DISCONTINUED | OUTPATIENT
Start: 2023-07-31 | End: 2023-07-31 | Stop reason: HOSPADM

## 2023-07-31 RX ORDER — SODIUM CHLORIDE 9 MG/ML
125 INJECTION, SOLUTION INTRAVENOUS CONTINUOUS
Status: DISCONTINUED | OUTPATIENT
Start: 2023-07-31 | End: 2023-07-31 | Stop reason: HOSPADM

## 2023-07-31 RX ORDER — CEFAZOLIN SODIUM 2 G/50ML
2000 SOLUTION INTRAVENOUS ONCE
Status: DISCONTINUED | OUTPATIENT
Start: 2023-07-31 | End: 2023-07-31 | Stop reason: HOSPADM

## 2023-07-31 RX ORDER — CHLORHEXIDINE GLUCONATE 0.12 MG/ML
15 RINSE ORAL ONCE
Status: COMPLETED | OUTPATIENT
Start: 2023-07-31 | End: 2023-07-31

## 2023-07-31 RX ORDER — GABAPENTIN 300 MG/1
300 CAPSULE ORAL ONCE
Status: COMPLETED | OUTPATIENT
Start: 2023-07-31 | End: 2023-07-31

## 2023-07-31 RX ORDER — ACETAMINOPHEN 325 MG/1
975 TABLET ORAL ONCE
Status: COMPLETED | OUTPATIENT
Start: 2023-07-31 | End: 2023-07-31

## 2023-07-31 RX ADMIN — CHLORHEXIDINE GLUCONATE 15 ML: 1.2 RINSE ORAL at 08:04

## 2023-07-31 RX ADMIN — ACETAMINOPHEN 325MG 975 MG: 325 TABLET ORAL at 08:04

## 2023-07-31 RX ADMIN — SODIUM CHLORIDE 125 ML/HR: 0.9 INJECTION, SOLUTION INTRAVENOUS at 08:04

## 2023-07-31 RX ADMIN — GABAPENTIN 300 MG: 300 CAPSULE ORAL at 08:04

## 2023-07-31 NOTE — NURSING NOTE
0930: Dr Peter Cintron in to evaluate patient's site of infection. Site drained; gauze applied and taped. Pt's surgery to be re-scheduled once infection clears. Pt to start course of antibiotics Dr Peter Cintron will prescribe, and then site will be re-assessed to determine new surgery date. All questions answered. Sister Linsey Armstrong called for patient pickup.

## 2023-07-31 NOTE — ANESTHESIA PREPROCEDURE EVALUATION
Procedure:  ARTHROPLASTY HIP TOTAL (Left: Hip)    Relevant Problems   CARDIO   (+) AAA (abdominal aortic aneurysm) without rupture (HCC)      /RENAL   (+) BPH without obstruction/lower urinary tract symptoms   (+) Renal stones      MUSCULOSKELETAL   (+) Osteoarthritis of left hip        Physical Exam    Airway    Mallampati score: II  TM Distance: >3 FB  Neck ROM: full     Dental   No notable dental hx     Cardiovascular  Rhythm: regular, Rate: normal, Cardiovascular exam normal    Pulmonary  Pulmonary exam normal Breath sounds clear to auscultation,     Other Findings        Anesthesia Plan  ASA Score- 2     Anesthesia Type- spinal with ASA Monitors. Additional Monitors:   Airway Plan:     Comment: SAB with MAC. Plan Factors-    Chart reviewed. Patient summary reviewed. Patient is not a current smoker. Patient instructed to abstain from smoking on day of procedure. Patient did not smoke on day of surgery. Obstructive sleep apnea risk education given perioperatively. Induction-     Postoperative Plan- Plan for postoperative opioid use. Informed Consent- Anesthetic plan and risks discussed with patient.

## 2023-08-02 ENCOUNTER — ANESTHESIA EVENT (OUTPATIENT)
Dept: PERIOP | Facility: HOSPITAL | Age: 68
End: 2023-08-02
Payer: MEDICARE

## 2023-08-03 ENCOUNTER — APPOINTMENT (OUTPATIENT)
Dept: PHYSICAL THERAPY | Facility: REHABILITATION | Age: 68
End: 2023-08-03
Payer: MEDICARE

## 2023-08-16 NOTE — PRE-PROCEDURE INSTRUCTIONS
Pre-Surgery Instructions:   Medication Instructions   • amLODIPine (NORVASC) 10 mg tablet Take day of surgery. • ascorbic acid (VITAMIN C) 500 MG tablet Hold day of surgery. • cholecalciferol (VITAMIN D3) 1,000 units tablet Hold day of surgery. • cloNIDine (CATAPRES) 0.1 mg tablet Take day of surgery. • folic acid (FOLVITE) 1 mg tablet Hold day of surgery. • hydrochlorothiazide (HYDRODIURIL) 25 mg tablet Stop taking 2 days prior to surgery. last dose sat 8/19   • losartan (COZAAR) 50 mg tablet Hold day of surgery. • metFORMIN (GLUCOPHAGE-XR) 500 mg 24 hr tablet Hold day of surgery. • Multiple Vitamins-Minerals (multivitamin with minerals) tablet Hold day of surgery. • NP Thyroid 60 MG tablet Take day of surgery. • rosuvastatin (CRESTOR) 20 MG tablet Take night before surgery    Medication instructions for day surgery reviewed. Please use only a sip of water to take your instructed medications. Avoid all over the counter vitamins, supplements and NSAIDS for one week prior to surgery per anesthesia guidelines. Tylenol is ok to take as needed. You will receive a call one business day prior to surgery with an arrival time and hospital directions. If your surgery is scheduled on a Monday, the hospital will be calling you on the Friday prior to your surgery. If you have not heard from anyone by 8pm, please call the hospital supervisor through the hospital  at [phone]. Raheem Vital [phone]). Do not eat or drink anything after midnight the night before your surgery, including candy, mints, lifesavers, or chewing gum. Do not drink alcohol 24hrs before your surgery. Try not to smoke at least 24hrs before your surgery. Follow the pre surgery showering instructions as listed in the [facility] Surgical Experience Booklet” or otherwise provided by your surgeon's office. Do not shave the surgical area 24 hours before surgery.  Do not apply any lotions, creams, including makeup, cologne, deodorant, or perfumes after showering on the day of your surgery. No contact lenses, eye make-up, or artificial eyelashes. Remove nail polish, including gel polish, and any artificial, gel, or acrylic nails if possible. Remove all jewelry including rings and body piercing jewelry. Wear causal clothing that is easy to take on and off. Consider your type of surgery. Keep any valuables, jewelry, piercings at home. Please bring any specially ordered equipment (sling, braces) if indicated. Arrange for a responsible person to drive you to and from the hospital on the day of your surgery. Visitor Guidelines discussed. Call the surgeon's office with any new illnesses, exposures, or additional questions prior to surgery. Please reference your [facility] Surgical Experience Booklet” for additional information to prepare for your upcoming surgery.

## 2023-08-21 ENCOUNTER — ANESTHESIA (OUTPATIENT)
Dept: PERIOP | Facility: HOSPITAL | Age: 68
End: 2023-08-21
Payer: MEDICARE

## 2023-08-21 ENCOUNTER — HOSPITAL ENCOUNTER (OUTPATIENT)
Facility: HOSPITAL | Age: 68
Setting detail: OUTPATIENT SURGERY
Discharge: HOME | End: 2023-08-22
Attending: STUDENT IN AN ORGANIZED HEALTH CARE EDUCATION/TRAINING PROGRAM | Admitting: STUDENT IN AN ORGANIZED HEALTH CARE EDUCATION/TRAINING PROGRAM
Payer: MEDICARE

## 2023-08-21 ENCOUNTER — APPOINTMENT (OUTPATIENT)
Dept: RADIOLOGY | Facility: HOSPITAL | Age: 68
End: 2023-08-21
Payer: MEDICARE

## 2023-08-21 DIAGNOSIS — M16.12 PRIMARY OSTEOARTHRITIS OF LEFT HIP: Primary | ICD-10-CM

## 2023-08-21 DIAGNOSIS — N28.9 RENAL INSUFFICIENCY: ICD-10-CM

## 2023-08-21 LAB
ABO GROUP BLD: NORMAL
BLD GP AB SCN SERPL QL: NEGATIVE
GLUCOSE SERPL-MCNC: 147 MG/DL (ref 65–140)
GLUCOSE SERPL-MCNC: 154 MG/DL (ref 65–140)
GLUCOSE SERPL-MCNC: 155 MG/DL (ref 65–140)
GLUCOSE SERPL-MCNC: 167 MG/DL (ref 65–140)
RH BLD: POSITIVE
SPECIMEN EXPIRATION DATE: NORMAL

## 2023-08-21 PROCEDURE — C1776 JOINT DEVICE (IMPLANTABLE): HCPCS | Performed by: STUDENT IN AN ORGANIZED HEALTH CARE EDUCATION/TRAINING PROGRAM

## 2023-08-21 PROCEDURE — 86901 BLOOD TYPING SEROLOGIC RH(D): CPT | Performed by: STUDENT IN AN ORGANIZED HEALTH CARE EDUCATION/TRAINING PROGRAM

## 2023-08-21 PROCEDURE — 27130 TOTAL HIP ARTHROPLASTY: CPT | Performed by: PHYSICIAN ASSISTANT

## 2023-08-21 PROCEDURE — 86900 BLOOD TYPING SEROLOGIC ABO: CPT | Performed by: STUDENT IN AN ORGANIZED HEALTH CARE EDUCATION/TRAINING PROGRAM

## 2023-08-21 PROCEDURE — 27130 TOTAL HIP ARTHROPLASTY: CPT | Performed by: STUDENT IN AN ORGANIZED HEALTH CARE EDUCATION/TRAINING PROGRAM

## 2023-08-21 PROCEDURE — 82948 REAGENT STRIP/BLOOD GLUCOSE: CPT

## 2023-08-21 PROCEDURE — 72170 X-RAY EXAM OF PELVIS: CPT

## 2023-08-21 PROCEDURE — 86850 RBC ANTIBODY SCREEN: CPT | Performed by: STUDENT IN AN ORGANIZED HEALTH CARE EDUCATION/TRAINING PROGRAM

## 2023-08-21 PROCEDURE — C1713 ANCHOR/SCREW BN/BN,TIS/BN: HCPCS | Performed by: STUDENT IN AN ORGANIZED HEALTH CARE EDUCATION/TRAINING PROGRAM

## 2023-08-21 DEVICE — EMPHASYS POLYETHYLENE LINER AOX NEUTRAL 56-58MM 40MM
Type: IMPLANTABLE DEVICE | Site: HIP | Status: FUNCTIONAL
Brand: EMPHASYS

## 2023-08-21 DEVICE — ACTIS DUOFIX HIP PROSTHESIS (FEMORAL STEM 12/14 TAPER CEMENTLESS SIZE 8 STD COLLAR)  CE
Type: IMPLANTABLE DEVICE | Site: HIP | Status: FUNCTIONAL
Brand: ACTIS

## 2023-08-21 DEVICE — EMPHASYS ACETABULAR SHELL THREE-HOLE 56MM CEMENTLESS
Type: IMPLANTABLE DEVICE | Site: HIP | Status: FUNCTIONAL
Brand: EMPHASYS

## 2023-08-21 DEVICE — BIOLOX DELTA TS CERAMIC FEMORAL HEAD 12/14 TAPER REVISION DIAMETER 40MM +1.5
Type: IMPLANTABLE DEVICE | Site: HIP | Status: FUNCTIONAL
Brand: BIOLOX DELTA

## 2023-08-21 DEVICE — PINNACLE CANCELLOUS BONE SCREW 6.5MM X 25MM
Type: IMPLANTABLE DEVICE | Site: HIP | Status: FUNCTIONAL
Brand: PINNACLE

## 2023-08-21 RX ORDER — OXYCODONE HYDROCHLORIDE 5 MG/1
5 TABLET ORAL EVERY 4 HOURS PRN
Qty: 42 TABLET | Refills: 0 | Status: SHIPPED | OUTPATIENT
Start: 2023-08-21 | End: 2023-08-31

## 2023-08-21 RX ORDER — SODIUM CHLORIDE, SODIUM LACTATE, POTASSIUM CHLORIDE, CALCIUM CHLORIDE 600; 310; 30; 20 MG/100ML; MG/100ML; MG/100ML; MG/100ML
125 INJECTION, SOLUTION INTRAVENOUS CONTINUOUS
Status: DISCONTINUED | OUTPATIENT
Start: 2023-08-21 | End: 2023-08-21

## 2023-08-21 RX ORDER — MAGNESIUM HYDROXIDE/ALUMINUM HYDROXICE/SIMETHICONE 120; 1200; 1200 MG/30ML; MG/30ML; MG/30ML
30 SUSPENSION ORAL EVERY 6 HOURS PRN
Status: DISCONTINUED | OUTPATIENT
Start: 2023-08-21 | End: 2023-08-22 | Stop reason: HOSPADM

## 2023-08-21 RX ORDER — ONDANSETRON 4 MG/1
4 TABLET, ORALLY DISINTEGRATING ORAL EVERY 6 HOURS PRN
Qty: 20 TABLET | Refills: 0 | Status: SHIPPED | OUTPATIENT
Start: 2023-08-21

## 2023-08-21 RX ORDER — HYDROCHLOROTHIAZIDE 25 MG/1
25 TABLET ORAL DAILY
Status: DISCONTINUED | OUTPATIENT
Start: 2023-08-21 | End: 2023-08-22

## 2023-08-21 RX ORDER — BUPIVACAINE HYDROCHLORIDE 2.5 MG/ML
INJECTION, SOLUTION EPIDURAL; INFILTRATION; INTRACAUDAL AS NEEDED
Status: DISCONTINUED | OUTPATIENT
Start: 2023-08-21 | End: 2023-08-21 | Stop reason: HOSPADM

## 2023-08-21 RX ORDER — HYDROMORPHONE HYDROCHLORIDE 1 MG/ML
INJECTION, SOLUTION INTRAMUSCULAR; INTRAVENOUS; SUBCUTANEOUS AS NEEDED
Status: DISCONTINUED | OUTPATIENT
Start: 2023-08-21 | End: 2023-08-21

## 2023-08-21 RX ORDER — ONDANSETRON 2 MG/ML
4 INJECTION INTRAMUSCULAR; INTRAVENOUS ONCE AS NEEDED
Status: DISCONTINUED | OUTPATIENT
Start: 2023-08-21 | End: 2023-08-21

## 2023-08-21 RX ORDER — ATORVASTATIN CALCIUM 40 MG/1
40 TABLET, FILM COATED ORAL
Status: DISCONTINUED | OUTPATIENT
Start: 2023-08-21 | End: 2023-08-22 | Stop reason: HOSPADM

## 2023-08-21 RX ORDER — CELECOXIB 200 MG/1
200 CAPSULE ORAL 2 TIMES DAILY
Status: DISCONTINUED | OUTPATIENT
Start: 2023-08-21 | End: 2023-08-22 | Stop reason: HOSPADM

## 2023-08-21 RX ORDER — CHLORHEXIDINE GLUCONATE 0.12 MG/ML
15 RINSE ORAL ONCE
Status: COMPLETED | OUTPATIENT
Start: 2023-08-21 | End: 2023-08-21

## 2023-08-21 RX ORDER — GABAPENTIN 300 MG/1
300 CAPSULE ORAL ONCE
Status: COMPLETED | OUTPATIENT
Start: 2023-08-21 | End: 2023-08-21

## 2023-08-21 RX ORDER — OXYCODONE HYDROCHLORIDE 5 MG/1
10 TABLET ORAL EVERY 4 HOURS PRN
Status: DISCONTINUED | OUTPATIENT
Start: 2023-08-21 | End: 2023-08-22 | Stop reason: HOSPADM

## 2023-08-21 RX ORDER — MAGNESIUM HYDROXIDE 1200 MG/15ML
LIQUID ORAL AS NEEDED
Status: DISCONTINUED | OUTPATIENT
Start: 2023-08-21 | End: 2023-08-21 | Stop reason: HOSPADM

## 2023-08-21 RX ORDER — FENTANYL CITRATE/PF 50 MCG/ML
50 SYRINGE (ML) INJECTION
Status: DISCONTINUED | OUTPATIENT
Start: 2023-08-21 | End: 2023-08-21

## 2023-08-21 RX ORDER — GABAPENTIN 300 MG/1
300 CAPSULE ORAL
Status: DISCONTINUED | OUTPATIENT
Start: 2023-08-21 | End: 2023-08-22 | Stop reason: HOSPADM

## 2023-08-21 RX ORDER — CELECOXIB 200 MG/1
200 CAPSULE ORAL 2 TIMES DAILY
Qty: 60 CAPSULE | Refills: 0 | Status: SHIPPED | OUTPATIENT
Start: 2023-08-21

## 2023-08-21 RX ORDER — AMLODIPINE BESYLATE 10 MG/1
10 TABLET ORAL DAILY
Status: DISCONTINUED | OUTPATIENT
Start: 2023-08-22 | End: 2023-08-22

## 2023-08-21 RX ORDER — TRANEXAMIC ACID 10 MG/ML
1000 INJECTION, SOLUTION INTRAVENOUS ONCE
Status: DISCONTINUED | OUTPATIENT
Start: 2023-08-21 | End: 2023-08-21

## 2023-08-21 RX ORDER — PROPOFOL 10 MG/ML
INJECTION, EMULSION INTRAVENOUS AS NEEDED
Status: DISCONTINUED | OUTPATIENT
Start: 2023-08-21 | End: 2023-08-21

## 2023-08-21 RX ORDER — ACETAMINOPHEN 325 MG/1
975 TABLET ORAL ONCE
Status: COMPLETED | OUTPATIENT
Start: 2023-08-21 | End: 2023-08-21

## 2023-08-21 RX ORDER — LIDOCAINE HYDROCHLORIDE 20 MG/ML
INJECTION, SOLUTION EPIDURAL; INFILTRATION; INTRACAUDAL; PERINEURAL AS NEEDED
Status: DISCONTINUED | OUTPATIENT
Start: 2023-08-21 | End: 2023-08-21

## 2023-08-21 RX ORDER — DOCUSATE SODIUM 100 MG/1
100 CAPSULE, LIQUID FILLED ORAL 2 TIMES DAILY
Status: DISCONTINUED | OUTPATIENT
Start: 2023-08-21 | End: 2023-08-22 | Stop reason: HOSPADM

## 2023-08-21 RX ORDER — CEFAZOLIN SODIUM 2 G/50ML
2000 SOLUTION INTRAVENOUS EVERY 8 HOURS
Status: COMPLETED | OUTPATIENT
Start: 2023-08-21 | End: 2023-08-22

## 2023-08-21 RX ORDER — OXYCODONE HYDROCHLORIDE 5 MG/1
5 TABLET ORAL EVERY 4 HOURS PRN
Status: DISCONTINUED | OUTPATIENT
Start: 2023-08-21 | End: 2023-08-22 | Stop reason: HOSPADM

## 2023-08-21 RX ORDER — KETOROLAC TROMETHAMINE 30 MG/ML
INJECTION, SOLUTION INTRAMUSCULAR; INTRAVENOUS AS NEEDED
Status: DISCONTINUED | OUTPATIENT
Start: 2023-08-21 | End: 2023-08-21 | Stop reason: HOSPADM

## 2023-08-21 RX ORDER — AMOXICILLIN 250 MG
1 CAPSULE ORAL DAILY
Qty: 30 TABLET | Refills: 0 | Status: SHIPPED | OUTPATIENT
Start: 2023-08-21

## 2023-08-21 RX ORDER — ONDANSETRON 2 MG/ML
INJECTION INTRAMUSCULAR; INTRAVENOUS AS NEEDED
Status: DISCONTINUED | OUTPATIENT
Start: 2023-08-21 | End: 2023-08-21

## 2023-08-21 RX ORDER — CHLORHEXIDINE GLUCONATE 4 G/100ML
SOLUTION TOPICAL DAILY PRN
Status: DISCONTINUED | OUTPATIENT
Start: 2023-08-21 | End: 2023-08-21

## 2023-08-21 RX ORDER — CALCIUM CARBONATE 500 MG/1
1000 TABLET, CHEWABLE ORAL DAILY PRN
Status: DISCONTINUED | OUTPATIENT
Start: 2023-08-21 | End: 2023-08-22 | Stop reason: HOSPADM

## 2023-08-21 RX ORDER — CEFAZOLIN SODIUM 2 G/50ML
2000 SOLUTION INTRAVENOUS ONCE
Status: COMPLETED | OUTPATIENT
Start: 2023-08-21 | End: 2023-08-21

## 2023-08-21 RX ORDER — ROCURONIUM BROMIDE 10 MG/ML
INJECTION, SOLUTION INTRAVENOUS AS NEEDED
Status: DISCONTINUED | OUTPATIENT
Start: 2023-08-21 | End: 2023-08-21

## 2023-08-21 RX ORDER — CLONIDINE HYDROCHLORIDE 0.1 MG/1
0.1 TABLET ORAL EVERY 12 HOURS SCHEDULED
Status: DISCONTINUED | OUTPATIENT
Start: 2023-08-21 | End: 2023-08-22 | Stop reason: HOSPADM

## 2023-08-21 RX ORDER — CEFADROXIL 500 MG/1
500 CAPSULE ORAL EVERY 12 HOURS SCHEDULED
Qty: 10 CAPSULE | Refills: 0 | Status: SHIPPED | OUTPATIENT
Start: 2023-08-21 | End: 2023-08-26

## 2023-08-21 RX ORDER — MIDAZOLAM HYDROCHLORIDE 2 MG/2ML
INJECTION, SOLUTION INTRAMUSCULAR; INTRAVENOUS AS NEEDED
Status: DISCONTINUED | OUTPATIENT
Start: 2023-08-21 | End: 2023-08-21

## 2023-08-21 RX ORDER — SENNOSIDES 8.6 MG
1 TABLET ORAL DAILY
Status: DISCONTINUED | OUTPATIENT
Start: 2023-08-21 | End: 2023-08-22 | Stop reason: HOSPADM

## 2023-08-21 RX ORDER — ONDANSETRON 2 MG/ML
4 INJECTION INTRAMUSCULAR; INTRAVENOUS EVERY 6 HOURS PRN
Status: DISCONTINUED | OUTPATIENT
Start: 2023-08-21 | End: 2023-08-22 | Stop reason: HOSPADM

## 2023-08-21 RX ORDER — FENTANYL CITRATE 50 UG/ML
INJECTION, SOLUTION INTRAMUSCULAR; INTRAVENOUS AS NEEDED
Status: DISCONTINUED | OUTPATIENT
Start: 2023-08-21 | End: 2023-08-21

## 2023-08-21 RX ORDER — METFORMIN HYDROCHLORIDE 500 MG/1
500 TABLET, EXTENDED RELEASE ORAL EVERY MORNING
Status: DISCONTINUED | OUTPATIENT
Start: 2023-08-22 | End: 2023-08-22

## 2023-08-21 RX ORDER — INSULIN LISPRO 100 [IU]/ML
1-6 INJECTION, SOLUTION INTRAVENOUS; SUBCUTANEOUS
Status: DISCONTINUED | OUTPATIENT
Start: 2023-08-21 | End: 2023-08-22

## 2023-08-21 RX ORDER — SODIUM CHLORIDE, SODIUM LACTATE, POTASSIUM CHLORIDE, CALCIUM CHLORIDE 600; 310; 30; 20 MG/100ML; MG/100ML; MG/100ML; MG/100ML
100 INJECTION, SOLUTION INTRAVENOUS CONTINUOUS
Status: DISCONTINUED | OUTPATIENT
Start: 2023-08-21 | End: 2023-08-22

## 2023-08-21 RX ORDER — ACETAMINOPHEN 500 MG
1000 TABLET ORAL EVERY 8 HOURS
Qty: 60 TABLET | Refills: 0 | Status: SHIPPED | OUTPATIENT
Start: 2023-08-21

## 2023-08-21 RX ORDER — ACETAMINOPHEN 325 MG/1
975 TABLET ORAL EVERY 8 HOURS
Status: DISCONTINUED | OUTPATIENT
Start: 2023-08-21 | End: 2023-08-22 | Stop reason: HOSPADM

## 2023-08-21 RX ADMIN — SODIUM CHLORIDE, SODIUM LACTATE, POTASSIUM CHLORIDE, AND CALCIUM CHLORIDE 125 ML/HR: .6; .31; .03; .02 INJECTION, SOLUTION INTRAVENOUS at 10:28

## 2023-08-21 RX ADMIN — PROPOFOL 180 MG: 10 INJECTION, EMULSION INTRAVENOUS at 13:22

## 2023-08-21 RX ADMIN — MIDAZOLAM 2 MG: 1 INJECTION INTRAMUSCULAR; INTRAVENOUS at 13:00

## 2023-08-21 RX ADMIN — ROCURONIUM BROMIDE 20 MG: 10 INJECTION, SOLUTION INTRAVENOUS at 14:20

## 2023-08-21 RX ADMIN — HYDROMORPHONE HYDROCHLORIDE 0.5 MG: 1 INJECTION, SOLUTION INTRAMUSCULAR; INTRAVENOUS; SUBCUTANEOUS at 14:20

## 2023-08-21 RX ADMIN — FENTANYL CITRATE 50 MCG: 50 INJECTION INTRAMUSCULAR; INTRAVENOUS at 15:29

## 2023-08-21 RX ADMIN — ROCURONIUM BROMIDE 50 MG: 10 INJECTION, SOLUTION INTRAVENOUS at 13:22

## 2023-08-21 RX ADMIN — SENNOSIDES 8.6 MG: 8.6 TABLET, FILM COATED ORAL at 16:30

## 2023-08-21 RX ADMIN — ONDANSETRON 4 MG: 2 INJECTION INTRAMUSCULAR; INTRAVENOUS at 14:53

## 2023-08-21 RX ADMIN — CEFAZOLIN SODIUM 2000 MG: 2 SOLUTION INTRAVENOUS at 13:00

## 2023-08-21 RX ADMIN — FENTANYL CITRATE 50 MCG: 50 INJECTION INTRAMUSCULAR; INTRAVENOUS at 13:12

## 2023-08-21 RX ADMIN — LIDOCAINE HYDROCHLORIDE 100 MG: 20 INJECTION, SOLUTION EPIDURAL; INFILTRATION; INTRACAUDAL at 13:22

## 2023-08-21 RX ADMIN — SUGAMMADEX 200 MG: 100 INJECTION, SOLUTION INTRAVENOUS at 14:55

## 2023-08-21 RX ADMIN — CHLORHEXIDINE GLUCONATE 15 ML: 1.2 RINSE ORAL at 10:11

## 2023-08-21 RX ADMIN — ACETAMINOPHEN 325MG 975 MG: 325 TABLET ORAL at 10:10

## 2023-08-21 RX ADMIN — FENTANYL CITRATE 50 MCG: 50 INJECTION INTRAMUSCULAR; INTRAVENOUS at 15:20

## 2023-08-21 RX ADMIN — CEFAZOLIN SODIUM 2000 MG: 2 SOLUTION INTRAVENOUS at 22:17

## 2023-08-21 RX ADMIN — SODIUM CHLORIDE, SODIUM LACTATE, POTASSIUM CHLORIDE, AND CALCIUM CHLORIDE 100 ML/HR: .6; .31; .03; .02 INJECTION, SOLUTION INTRAVENOUS at 15:41

## 2023-08-21 RX ADMIN — ASPIRIN 81 MG: 81 TABLET, COATED ORAL at 22:17

## 2023-08-21 RX ADMIN — SODIUM CHLORIDE, SODIUM LACTATE, POTASSIUM CHLORIDE, AND CALCIUM CHLORIDE 1000 ML: .6; .31; .03; .02 INJECTION, SOLUTION INTRAVENOUS at 15:40

## 2023-08-21 RX ADMIN — SODIUM CHLORIDE, SODIUM LACTATE, POTASSIUM CHLORIDE, AND CALCIUM CHLORIDE: .6; .31; .03; .02 INJECTION, SOLUTION INTRAVENOUS at 13:53

## 2023-08-21 RX ADMIN — ACETAMINOPHEN 325MG 975 MG: 325 TABLET ORAL at 16:30

## 2023-08-21 RX ADMIN — ACETAMINOPHEN 325MG 975 MG: 325 TABLET ORAL at 23:42

## 2023-08-21 RX ADMIN — HYDROCHLOROTHIAZIDE 25 MG: 25 TABLET ORAL at 16:30

## 2023-08-21 RX ADMIN — FENTANYL CITRATE 100 MCG: 50 INJECTION INTRAMUSCULAR; INTRAVENOUS at 13:57

## 2023-08-21 RX ADMIN — DOCUSATE SODIUM 100 MG: 100 CAPSULE, LIQUID FILLED ORAL at 18:34

## 2023-08-21 RX ADMIN — ATORVASTATIN CALCIUM 40 MG: 40 TABLET, FILM COATED ORAL at 16:31

## 2023-08-21 RX ADMIN — OXYCODONE HYDROCHLORIDE 10 MG: 5 TABLET ORAL at 22:17

## 2023-08-21 RX ADMIN — GABAPENTIN 300 MG: 300 CAPSULE ORAL at 10:09

## 2023-08-21 RX ADMIN — GABAPENTIN 300 MG: 300 CAPSULE ORAL at 22:17

## 2023-08-21 RX ADMIN — CELECOXIB 200 MG: 200 CAPSULE ORAL at 18:34

## 2023-08-21 RX ADMIN — FENTANYL CITRATE 50 MCG: 50 INJECTION INTRAMUSCULAR; INTRAVENOUS at 13:22

## 2023-08-21 NOTE — DISCHARGE INSTR - AVS FIRST PAGE
Dr. Zaheer Lynch Hip Replacement    What to Expect/Activity  You are weight bearing as tolerated to your operative leg unless otherwise instructed. Use your walker or crutches. It is important to get up and walk for 10 minutes every hour, if possible. Initial recovery therapy is focused on walking. If in your follow-up a referral to formal physical therapy is required, this will be provided based on your recovery. You may sleep however you would like. Many patients feel more comfortable with a pillow between their legs and find it uncomfortably to lay on the operative side. If you do roll on that side at night you will not damage the replacement. You may use a regular or elevated toilet seat, whichever is more comfortable. We do not routinely require any specific precautions in terms of positioning of your leg and if so this will be taught to you by the physical therapists at the hospital. This means that you can dress as you are comfortable, including shoes and socks. You can bend down carefully to get items from the floor. Swelling and discomfort causes pain. Elevate your surgical leg on 1-2 pillows placed behind your ankle/calf throughout the day, especially when you are laying down. Please use ice packs for 20-30 minutes every 1-2 hours for 48-72 hours on the operative hip. Please make sure there is a barrier directly between your skin and your ice/ice pack. Please use incentive spirometer 10 times per hour while awake (see diagram below). Dressing/Wound Care/Bathing  There is a surgical dressing over your incision that stays in place until follow-up unless it starts to peel off. You may start showering 24 hours after surgery, the surgical dressing will remain in place. Please pat the dressing dry. If you notice the dressing appears saturated or is starting to come off, please replace with a dry dressing. Keep the dressing on until your follow-up in the office.    There may be dried blood around the incision. It is ok to continue showering after removing the dressing but do not scrub the incision. Pat incision dry. Do not place any creams, ointments or gels on or around the incision. No baths, swimming or submerging until cleared by Dr. Zaheer Lynch. Pain Management/Medications  You may resume your usual medications. Please take the following medications:  Anti-coagulation (blood clot prevention) - aspirin 81mg twice daily for 4 weeks  Pain medication:  Narcotic Medication: Take as directed  NSAID (Anti-inflammatory): Take as directed  Tylenol 1000mg every 8 hours  Zofran (ondasetron) - 4mg every 8 hours as needed for nausea  Stool Softeners (senna/colace)- take daily to help prevent constipation as narcotic pain medication causes constipation  Antibiotic - take as directed if prescribed  If you have questions or pain concerns, please contact the office. Pain medication cannot remove all post-operative pain. Follow up/Call if:  The findings of your surgery will be explained to you and your family immediately after surgery. However, in the post-operative period, during recovery from anesthesia you may not fully remember or fully understand what was said. We will go over this again when you return for your post-op appointment.   Please contact Dr. Jessie Elizondo office if you experience the following:  Excessive bleeding (bleeding through your dressing)  Fever greater than 101 degrees F after the first 2 days (a slight fever is normal the first few days after surgery)  Persistent nausea or vomiting  Decreased sensation or discoloration of the operative limb  Pain or swelling that is getting worse and not better with medication    Dr. Solitario Horvath: [phone]

## 2023-08-21 NOTE — PHYSICAL THERAPY NOTE
PHYSICAL THERAPY NOTE          Patient Name: Fei [place]  NPGDV'S Date: 8/21/2023 08/21/23 1620   PT Last Visit   PT Visit Date 08/21/23   Note Type   Note type Cancelled Session   Cancel Reasons Other   Additional Comments PT consult received. Chart reviewed. Spoke with nursing and patient. Pt request to have therapy at later time given groggy state. PT will follow and complete evaluation as appropriate.      Kylee Ybarra, PT

## 2023-08-21 NOTE — PROGRESS NOTES
Pt last voided @ 2300 on 8/20/23 and pt attempted to void able to urinate 75 ml ellyn. Post void bladder scan 29 ml. Dr. Lam Krishnamurthy updated.

## 2023-08-21 NOTE — PLAN OF CARE
Problem: PAIN - ADULT  Goal: Verbalizes/displays adequate comfort level or baseline comfort level  Description: Interventions:  - Encourage patient to monitor pain and request assistance  - Assess pain using appropriate pain scale  - Administer analgesics based on type and severity of pain and evaluate response  - Implement non-pharmacological measures as appropriate and evaluate response  - Consider cultural and social influences on pain and pain management  - Notify physician/advanced practitioner if interventions unsuccessful or patient reports new pain  Outcome: Progressing     Problem: INFECTION - ADULT  Goal: Absence or prevention of progression during hospitalization  Description: INTERVENTIONS:  - Assess and monitor for signs and symptoms of infection  - Monitor lab/diagnostic results  - Monitor all insertion sites, i.e. indwelling lines, tubes, and drains  - Monitor endotracheal if appropriate and nasal secretions for changes in amount and color  - [place] appropriate cooling/warming therapies per order  - Administer medications as ordered  - Instruct and encourage patient and family to use good hand hygiene technique  - Identify and instruct in appropriate isolation precautions for identified infection/condition  Outcome: Progressing     Problem: SAFETY ADULT  Goal: Maintain or return to baseline ADL function  Description: INTERVENTIONS:  -  Assess patient's ability to carry out ADLs; assess patient's baseline for ADL function and identify physical deficits which impact ability to perform ADLs (bathing, care of mouth/teeth, toileting, grooming, dressing, etc.)  - Assess/evaluate cause of self-care deficits   - Assess range of motion  - Assess patient's mobility; develop plan if impaired  - Assess patient's need for assistive devices and provide as appropriate  - Encourage maximum independence but intervene and supervise when necessary  - Involve family in performance of ADLs  - Assess for home care needs following discharge   - Consider OT consult to assist with ADL evaluation and planning for discharge  - Provide patient education as appropriate  Outcome: Progressing  Goal: Patient will remain free of falls  Description: INTERVENTIONS:  - Educate patient/family on patient safety including physical limitations  - Instruct patient to call for assistance with activity   - Consult OT/PT to assist with strengthening/mobility   - Keep Call bell within reach  - Keep bed low and locked with side rails adjusted as appropriate  - Keep care items and personal belongings within reach  - Initiate and maintain comfort rounds  - Make Fall Risk Sign visible to staff  - Offer Toileting every 2 Hours, in advance of need  - Initiate/Maintain bed alarm  - Obtain necessary fall risk management equipment: gripper socks, call bell  - Apply yellow socks and bracelet for high fall risk patients  - Consider moving patient to room near nurses station  Outcome: Progressing     Problem: DISCHARGE PLANNING  Goal: Discharge to home or other facility with appropriate resources  Description: INTERVENTIONS:  - Identify barriers to discharge w/patient and caregiver  - Arrange for needed discharge resources and transportation as appropriate  - Identify discharge learning needs (meds, wound care, etc.)  - Arrange for interpretive services to assist at discharge as needed  - Refer to Case Management Department for coordinating discharge planning if the patient needs post-hospital services based on physician/advanced practitioner order or complex needs related to functional status, cognitive ability, or social support system  Outcome: Progressing     Problem: Knowledge Deficit  Goal: Patient/family/caregiver demonstrates understanding of disease process, treatment plan, medications, and discharge instructions  Description: Complete learning assessment and assess knowledge base.   Interventions:  - Provide teaching at level of understanding  - Provide teaching via preferred learning methods  Outcome: Progressing     Problem: MUSCULOSKELETAL - ADULT  Goal: Maintain or return mobility to safest level of function  Description: INTERVENTIONS:  - Assess patient's ability to carry out ADLs; assess patient's baseline for ADL function and identify physical deficits which impact ability to perform ADLs (bathing, care of mouth/teeth, toileting, grooming, dressing, etc.)  - Assess/evaluate cause of self-care deficits   - Assess range of motion  - Assess patient's mobility  - Assess patient's need for assistive devices and provide as appropriate  - Encourage maximum independence but intervene and supervise when necessary  - Involve family in performance of ADLs  - Assess for home care needs following discharge   - Consider OT consult to assist with ADL evaluation and planning for discharge  - Provide patient education as appropriate  Outcome: Progressing  Goal: Maintain proper alignment of affected body part  Description: INTERVENTIONS:  - Support, maintain and protect limb and body alignment  - Provide patient/ family with appropriate education  Outcome: Progressing

## 2023-08-21 NOTE — ANESTHESIA PREPROCEDURE EVALUATION
Procedure:  ARTHROPLASTY HIP TOTAL (Left: Hip)    Relevant Problems   CARDIO   (+) AAA (abdominal aortic aneurysm) without rupture (HCC)   (+) Hypertension      /RENAL   (+) BPH without obstruction/lower urinary tract symptoms   (+) Renal stones      MUSCULOSKELETAL   (+) Osteoarthritis of left hip      NEURO/PSYCH   (+) Legally blind in right eye, as defined in [address]   (+) Retention of urine      Other   (+) Class 1 obesity due to excess calories without serious comorbidity with body mass index (BMI) of 32.0 to 32.9 in adult   (+) Elevated LFTs   (+) Former smoker        Physical Exam    Airway    Mallampati score: III  TM Distance: >3 FB  Neck ROM: full     Dental       Cardiovascular  Rhythm: regular, Rate: normal, Cardiovascular exam normal    Pulmonary  Pulmonary exam normal Breath sounds clear to auscultation,     Other Findings        Anesthesia Plan  ASA Score- 2     Anesthesia Type- spinal with ASA Monitors. Additional Monitors:   Airway Plan:     Comment:  Left Ventricle: Left ventricle is normal in size. There is mild   hypertrophy. Systolic function is normal with an ejection fraction of   60-65%. There is grade I (mild) diastolic dysfunction. •  Right Ventricle: Systolic function is normal.   •  No significant valvular abnormalities. .       Plan Factors-    Chart reviewed. Existing labs reviewed. Patient summary reviewed. Patient is not a current smoker. Patient not instructed to abstain from smoking on day of procedure. Patient did not smoke on day of surgery. There is medical exclusion for perioperative obstructive sleep apnea risk education. Induction- intravenous. Postoperative Plan-     Informed Consent- Anesthetic plan and risks discussed with patient.

## 2023-08-21 NOTE — ANESTHESIA POSTPROCEDURE EVALUATION
Post-Op Assessment Note    CV Status:  Stable    Pain management: adequate     Mental Status:  Alert and awake   Hydration Status:  Euvolemic   PONV Controlled:  Controlled   Airway Patency:  Patent      Post Op Vitals Reviewed: Yes      Staff: Anesthesiologist         No notable events documented.     BP      Temp     Pulse     Resp      SpO2      /89   Pulse 70   Temp (!) 97.2 °F (36.2 °C)   Resp 16   Ht 5' 10" (1.778 m)   Wt 102 kg (224 lb 6.9 oz)   SpO2 97%   BMI 32.20 kg/m²

## 2023-08-22 VITALS
WEIGHT: 225.75 LBS | SYSTOLIC BLOOD PRESSURE: 107 MMHG | HEIGHT: 70 IN | RESPIRATION RATE: 18 BRPM | DIASTOLIC BLOOD PRESSURE: 79 MMHG | HEART RATE: 75 BPM | TEMPERATURE: 98.4 F | BODY MASS INDEX: 32.32 KG/M2 | OXYGEN SATURATION: 96 %

## 2023-08-22 PROBLEM — D62 ACUTE BLOOD LOSS ANEMIA: Status: ACTIVE | Noted: 2023-08-22

## 2023-08-22 PROBLEM — R73.03 PREDIABETES: Status: ACTIVE | Noted: 2023-08-22

## 2023-08-22 LAB
ANION GAP SERPL CALCULATED.3IONS-SCNC: 8 MMOL/L
BUN SERPL-MCNC: 18 MG/DL (ref 5–25)
CALCIUM SERPL-MCNC: 8.3 MG/DL (ref 8.4–10.2)
CHLORIDE SERPL-SCNC: 105 MMOL/L (ref 96–108)
CO2 SERPL-SCNC: 26 MMOL/L (ref 21–32)
CREAT SERPL-MCNC: 0.93 MG/DL (ref 0.6–1.3)
ERYTHROCYTE [DISTWIDTH] IN BLOOD BY AUTOMATED COUNT: 12.6 % (ref 11.6–15.1)
GFR SERPL CREATININE-BSD FRML MDRD: 85 ML/MIN/1.73SQ M
GLUCOSE SERPL-MCNC: 117 MG/DL (ref 65–140)
GLUCOSE SERPL-MCNC: 160 MG/DL (ref 65–140)
GLUCOSE SERPL-MCNC: 161 MG/DL (ref 65–140)
HCT VFR BLD AUTO: 33.8 % (ref 36.5–49.3)
HGB BLD-MCNC: 11.1 G/DL (ref 12–17)
MCH RBC QN AUTO: 33.4 PG (ref 26.8–34.3)
MCHC RBC AUTO-ENTMCNC: 32.8 G/DL (ref 31.4–37.4)
MCV RBC AUTO: 102 FL (ref 82–98)
PLATELET # BLD AUTO: 156 THOUSANDS/UL (ref 149–390)
PMV BLD AUTO: 11.2 FL (ref 8.9–12.7)
POTASSIUM SERPL-SCNC: 3.8 MMOL/L (ref 3.5–5.3)
RBC # BLD AUTO: 3.32 MILLION/UL (ref 3.88–5.62)
SODIUM SERPL-SCNC: 139 MMOL/L (ref 135–147)
WBC # BLD AUTO: 7.49 THOUSAND/UL (ref 4.31–10.16)

## 2023-08-22 PROCEDURE — 85027 COMPLETE CBC AUTOMATED: CPT | Performed by: PHYSICIAN ASSISTANT

## 2023-08-22 PROCEDURE — 82948 REAGENT STRIP/BLOOD GLUCOSE: CPT

## 2023-08-22 PROCEDURE — 80048 BASIC METABOLIC PNL TOTAL CA: CPT | Performed by: PHYSICIAN ASSISTANT

## 2023-08-22 PROCEDURE — 97167 OT EVAL HIGH COMPLEX 60 MIN: CPT

## 2023-08-22 PROCEDURE — 99221 1ST HOSP IP/OBS SF/LOW 40: CPT | Performed by: HOSPITALIST

## 2023-08-22 PROCEDURE — NC001 PR NO CHARGE: Performed by: INTERNAL MEDICINE

## 2023-08-22 PROCEDURE — 99024 POSTOP FOLLOW-UP VISIT: CPT | Performed by: PHYSICIAN ASSISTANT

## 2023-08-22 PROCEDURE — 97162 PT EVAL MOD COMPLEX 30 MIN: CPT

## 2023-08-22 RX ORDER — AMLODIPINE BESYLATE 10 MG/1
10 TABLET ORAL DAILY
Status: DISCONTINUED | OUTPATIENT
Start: 2023-08-22 | End: 2023-08-22 | Stop reason: HOSPADM

## 2023-08-22 RX ORDER — PANTOPRAZOLE SODIUM 40 MG/1
40 TABLET, DELAYED RELEASE ORAL
Status: DISCONTINUED | OUTPATIENT
Start: 2023-08-22 | End: 2023-08-22 | Stop reason: HOSPADM

## 2023-08-22 RX ORDER — INSULIN LISPRO 100 [IU]/ML
1-5 INJECTION, SOLUTION INTRAVENOUS; SUBCUTANEOUS
Status: DISCONTINUED | OUTPATIENT
Start: 2023-08-22 | End: 2023-08-22 | Stop reason: HOSPADM

## 2023-08-22 RX ADMIN — CEFAZOLIN SODIUM 2000 MG: 2 SOLUTION INTRAVENOUS at 05:36

## 2023-08-22 RX ADMIN — INSULIN LISPRO 1 UNITS: 100 INJECTION, SOLUTION INTRAVENOUS; SUBCUTANEOUS at 11:29

## 2023-08-22 RX ADMIN — PANTOPRAZOLE SODIUM 40 MG: 40 TABLET, DELAYED RELEASE ORAL at 11:29

## 2023-08-22 RX ADMIN — CLONIDINE HYDROCHLORIDE 0.1 MG: 0.1 TABLET ORAL at 08:38

## 2023-08-22 RX ADMIN — SODIUM CHLORIDE, SODIUM LACTATE, POTASSIUM CHLORIDE, AND CALCIUM CHLORIDE 100 ML/HR: .6; .31; .03; .02 INJECTION, SOLUTION INTRAVENOUS at 02:21

## 2023-08-22 RX ADMIN — ACETAMINOPHEN 325MG 975 MG: 325 TABLET ORAL at 07:20

## 2023-08-22 RX ADMIN — SENNOSIDES 8.6 MG: 8.6 TABLET, FILM COATED ORAL at 08:38

## 2023-08-22 RX ADMIN — ASPIRIN 81 MG: 81 TABLET, COATED ORAL at 08:38

## 2023-08-22 RX ADMIN — CELECOXIB 200 MG: 200 CAPSULE ORAL at 08:38

## 2023-08-22 RX ADMIN — AMLODIPINE BESYLATE 10 MG: 10 TABLET ORAL at 08:37

## 2023-08-22 RX ADMIN — INSULIN LISPRO 1 UNITS: 100 INJECTION, SOLUTION INTRAVENOUS; SUBCUTANEOUS at 08:37

## 2023-08-22 RX ADMIN — DOCUSATE SODIUM 100 MG: 100 CAPSULE, LIQUID FILLED ORAL at 08:38

## 2023-08-22 NOTE — PHYSICAL THERAPY NOTE
PHYSICAL THERAPY EVALUATION          Patient Name: Macie Phoenix OREIT'T Date: 8/22/2023 08/22/23 0831   Note Type   Note type Evaluation   Pain Assessment   Pain Assessment Tool 0-10   Pain Score 4   Pain Location/Orientation Orientation: Left; Location: Hip   Hospital Pain Intervention(s) Ambulation/increased activity;Repositioned   Restrictions/Precautions   Weight Bearing Precautions Per Order Yes   LLE Weight Bearing Per Order WBAT   Other Precautions WBS; Fall Risk;Pain;Multiple lines;Visual impairment   Home Living   Type of Home House   Home Layout One level  (0 YANICK)   Bathroom Shower/Tub Tub/shower unit   Bathroom Toilet Standard   Bathroom Equipment Other (Comment)  (none)   Home Equipment Walker   Additional Comments Pt planning to d/c to sister's home. Set up as listed. Prior Function   Level of [place] Independent with ADLs; Independent with functional mobility; Independent with IADLS   Lives With Other (Comment)  (to be staying with sister, reports she will be home to assist often)   IADLs Independent with driving; Independent with meal prep; Independent with medication management   Falls in the last 6 months 0   Vocational Full time employment   Comments No AD use at baseline   General   Additional Pertinent History Admitted 8/21/23 for L hip OA , now s/p L JACKELYN. Activity as walker orders. PMH sig for DM, HTN, R visual impairment. Cognition   Overall Cognitive Status WFL   Arousal/Participation Cooperative   Orientation Level Oriented X4   Memory Within functional limits   Following Commands Follows all commands and directions without difficulty   RLE Assessment   RLE Assessment WFL   LLE Assessment   LLE Assessment X  (pain limited)   Bed Mobility   Supine to Sit 5  Supervision   Additional items Bedrails; Increased time required   Transfers   Sit to Stand 5  Supervision   Additional items Increased time required;Verbal cues; Other  (RW)   Stand to Sit 5  Supervision   Additional items Increased time required;Verbal cues   Additional Comments VCs for hand placement and positioning. Ambulation/Elevation   Gait pattern Excessively slow;Decreased L stance;Decreased foot clearance; Short stride; Inconsistent darrell; Antalgic   Gait Assistance 5  Supervision   Additional items Verbal cues  (for RW management)   Assistive Device Rolling walker   Distance 180'   Balance   Static Sitting Good   Dynamic Sitting Fair +   Static Standing Fair   Dynamic Standing Fair -   Ambulatory Fair -   Endurance Deficit   Endurance Deficit Yes   Endurance Deficit Description weight bearing intolerance/pain. /86 pre mobility and 132/87 post.   Activity Tolerance   Activity Tolerance Patient limited by pain; Patient tolerated treatment well   Medical Staff Made Aware PT ROBE Chan   Nurse Made Aware YEHUDA Sofia   Assessment   Prognosis Good   Problem List Decreased strength;Decreased endurance; Impaired balance;Decreased mobility;Pain;Obesity; Impaired vision;Decreased skin integrity   Assessment Fei Fitch is a 79 y.o. male admitted to [address] [place] [address] on 8/21/2023 for Osteoarthritis of left hip, now POD #1 L JACKELYN. PT was consulted and pt was seen on 8/22/2023 for mobility assessment and d/c planning. Pt presents with multiple lines, post-op pain, increased risk for falls and WBS per ortho. Pt is currently functioning at a supervision assistance x1 level for bed mobility, supervision assistance x1 level for transfers, supervision assistance x1 level for ambulation with Rolling Walker. Pt demonstrated decreased strength and balance, weight bearing intolerance, gait deviations and decreased endurance upon initial evaluation. Pt able to perform all mobility without physical assist and occasional cuing for RW management and transfer technique. No dizziness or other adverse symptoms noted (seee flowsheet for BP). No concerns reported with d/c to sister's home.  Pt ambulating household distances, though limited by pain at this time. At baseline, pt was independent with all functional mobility wo AD. Pt will benefit from continued skilled IP PT to address the above mentioned impairments  in order to maximize recovery and increase functional independence when completing mobility and ADLs. At this time PT recommendations for d/c are home with OPPT. Barriers to Discharge None   Goals   Patient Goals to return home   STG Expiration Date 09/01/23   Short Term Goal #1 (1) Pt will perform bed mobility with mod I demonstrating appropriate form 100% of the time to promote improved functional mobility. (2) Perform all transfers with mod I and appropriate technique 100% of the time to promote ability to safely negotiate home environment. (3) Ambulate at least 250' with LRAD and mod I to promote safe negotiation of home environment. (4) Improve overall strength and balance 1/2 grade to optimize ability to perform functional mobility, ADLs, IADLs and demonstrate reduced fall risk. (5) Increase activity tolerance to 45 minutes to promote endurance for completion of functional tasks. (6) Continue PT for ongoing pt/family education, DME needs, and d/c planning to promote highest level of function in least restricted environment. Plan   Treatment/Interventions Functional transfer training;LE strengthening/ROM; Therapeutic exercise; Endurance training;Equipment eval/education; Bed mobility;Gait training;Spoke to nursing;OT   PT Frequency 5-7x/wk   Recommendation   PT Discharge Recommendation Home with outpatient rehabilitation   AM-PAC Basic Mobility Inpatient   Turning in Flat Bed Without Bedrails 4   Lying on Back to Sitting on Edge of Flat Bed Without Bedrails 3   Moving Bed to Chair 3   Standing Up From Chair Using Arms 3   Walk in Room 3   Climb 3-5 Stairs With Railing 3   Basic Mobility Inpatient Raw Score   19    The patient's AM-[facility] Basic Mobility Inpatient Short Form Raw Score is 19.  A Raw score of greater than or equal to 16 suggests the patient may benefit from discharge to home. Please also refer to the recommendation of the Physical Therapist for safe discharge planning. Basic Mobility Standardized Score 42.48   Highest Level Of Mobility   -HLM Goal 6: Walk 10 steps or more   JH-HLM Achieved 7: Walk 25 feet or more   End of Consult   Patient Position at End of Consult Seated edge of bed; All needs within reach   End of Consult Comments OT present     Factors contributing to complexity  History: co-morbidities, social background, fall risk, reliance on DME, multiple lines, WB status, pain  Examination of body systems: MSK (strength, ROM), cardiopulmonary, neuromuscular (sensation, balance, gait, transfers, motor function), functional (am-pac)  Clinical presentation: evolving  Complexity: moderate    Neoma Schools, SPT

## 2023-08-22 NOTE — CONSULTS
This consult was generated through the SOC/Nephrology JENNY risk reduction program. The patient's chart was reviewed and the GFR is > 60 and the patient has not had any JENNY episodes within the last 3 months. Therefore a Nephrology consult is not needed. We will not formally see the patient but should you need any Nephrology follow up, please re-consult us.     Recommend to hold losartan and HTCZ post operatively and those meds can be resumed after discharge as long as kidney function remains stable, avoid NSAIDs  Discussed with ortho team via TT

## 2023-08-22 NOTE — ASSESSMENT & PLAN NOTE
Hemoglobin A1c in prediabetic range, on metformin at home  · Hold metformin while inpatient, continue with SSI and Accu-Cheks  · Sugars adequately controlled, goal 140-180 while inpatient  · Diabetic diet

## 2023-08-22 NOTE — PLAN OF CARE
Problem: PHYSICAL THERAPY ADULT  Goal: Performs mobility at highest level of function for planned discharge setting. See evaluation for individualized goals. Description: Treatment/Interventions: Functional transfer training, LE strengthening/ROM, Therapeutic exercise, Endurance training, Equipment eval/education, Bed mobility, Gait training, Spoke to nursing, OT          See flowsheet documentation for full assessment, interventions and recommendations. Note: Prognosis: Good  Problem List: Decreased strength, Decreased endurance, Impaired balance, Decreased mobility, Pain, Obesity, Impaired vision, Decreased skin integrity  Assessment: Autumn Valdivia is a 79 y.o. male admitted to [address] [place] [address] on 8/21/2023 for Osteoarthritis of left hip, now POD #1 L JACKELYN. PT was consulted and pt was seen on 8/22/2023 for mobility assessment and d/c planning. Pt presents with multiple lines, post-op pain, increased risk for falls and WBS per ortho. Pt is currently functioning at a supervision assistance x1 level for bed mobility, supervision assistance x1 level for transfers, supervision assistance x1 level for ambulation with Rolling Walker. Pt demonstrated decreased strength and balance, weight bearing intolerance, gait deviations and decreased endurance upon initial evaluation. Pt able to perform all mobility without physical assist and occasional cuing for RW management and transfer technique. No dizziness or other adverse symptoms noted (seee flowsheet for BP). No concerns reported with d/c to sister's home. Pt ambulating household distances, though limited by pain at this time. At baseline, pt was independent with all functional mobility wo AD. Pt will benefit from continued skilled IP PT to address the above mentioned impairments  in order to maximize recovery and increase functional independence when completing mobility and ADLs. At this time PT recommendations for d/c are home with OPPT.   Barriers to Discharge: None     PT Discharge Recommendation: Home with outpatient rehabilitation    See flowsheet documentation for full assessment.

## 2023-08-22 NOTE — CONSULTS
[facility]  Consult  Name: Jax Harrington 79 y.o. male I MRN: [id number]  Unit/Bed#: E2 -01 I Date of Admission: 8/21/2023   Date of Service: 8/22/2023 I Hospital Day: 0    Inpatient consult to Internal Medicine  Consult performed by: Clarence Benitez PA-C  Consult ordered by: Lisa Morin PA-C          Assessment/Plan   * Osteoarthritis of left hip  Assessment & Plan  Patient status post left total hip arthroplasty with lateral approach on 8/21/2023  · On aspirin 81 mg twice daily for DVT prophylaxis x4 weeks per orthopedic recommendations  · Would suggest GI prophylaxis as patient is also on Celebrex which is an NSAID. · Leg neurovascularly intact, reports adequate output. No signs/symptoms of bleeding. On room air. · PT/OT  · Rest of care per primary      Hypertension  Assessment & Plan  Blood pressure adequately controlled  · Continue PTA Norvasc 10 mg, clonidine 0.1 mg twice daily  · Patient with adequate intake. Renal function stable. Can resume home losartan 50 mg daily and hydrochlorothiazide 25 mg daily with close blood pressure monitoring and follow-up PCP on discharge    Acute blood loss anemia  Assessment & Plan  Noted with hemoglobin drop of 14.9-11.1  · In setting of postoperative state and hemodilution with fluids  · No signs or symptoms of bleeding on exam.  Hemodynamically stable. · We will obtain iron panel.   If patient is to discharge home would suggest repeat CBC in 1 week with primary    Prediabetes  Assessment & Plan  Hemoglobin A1c in prediabetic range, on metformin at home  · Hold metformin while inpatient, continue with SSI and Accu-Cheks  · Sugars adequately controlled, goal 140-180 while inpatient  · Diabetic diet    Legally blind in right eye, as defined in [place]  Noted    Class 1 obesity due to excess calories without serious comorbidity with body mass index (BMI) of 32.0 to 32.9 in adult  Assessment & Plan  BMI 32.39, noted  · Contribute to all aspects of care. Encourage PT OT/rehabilitation    BPH without obstruction/lower urinary tract symptoms  Assessment & Plan  Not maintained on medications  · Monitor intake and output post operatively  · Bladder scan    VTE Prophylaxis:   aspirin 81 mg bid per primary    Recommendations for Discharge:  · Obtain CBC In week with PCP  · GI prophylaxis if patient is to continue Celebrex and aspirin on discharge. May benefit from different short-term pain regimen while on aspirin    Total Time Spent on Date of Encounter in care of patient: 35 minutes This time was spent on one or more of the following: performing physical exam; counseling and coordination of care; obtaining or reviewing history; documenting in the medical record; reviewing/ordering tests, medications or procedures; communicating with other healthcare professionals and discussing with patient's family/caregivers. Collaboration of Care: Were Recommendations Directly Discussed with Primary Treatment Team?    History of Present Illness:  Lisa Marin is a 79 y.o. male who is originally admitted to the orthopedic service due to left hip total arthroplasty. We are consulted for medical management. Patient seen and examined today. Reports he feels well. His pain regimen is helping him. He denies any numbness or tingling in his leg or groin. He has been eating and drinking without abdominal pain, nausea or vomiting. He is urinating without difficulty. He has yet to have a bowel movement but is passing gas. Denies any fever, chills, chest pain, shortness of breath, dizziness. He was up moving with therapy today. He is looking forward to recovering. He is going to be staying with his sister for a few days after he leaves here. He denies smoking cigarettes or using tobacco.  He does drink recreationally. Review of Systems:  Review of Systems   Constitutional: Negative for chills and fever.    Eyes: Positive for visual disturbance. Respiratory: Negative for cough and shortness of breath. Cardiovascular: Negative for chest pain and palpitations. Gastrointestinal: Negative for abdominal distention, abdominal pain, diarrhea, nausea and vomiting. Genitourinary: Negative for decreased urine volume and hematuria. Musculoskeletal: Positive for arthralgias. Skin: Negative for rash. Neurological: Negative for dizziness, light-headedness, numbness and headaches. Psychiatric/Behavioral: Negative for sleep disturbance. The patient is not nervous/anxious. All other systems reviewed and are negative. Past Medical and Surgical History:   Past Medical History:   Diagnosis Date   • Abdominal pain    • Acute infection of external ear    • BPH with obstruction/lower urinary tract symptoms    • BPH without urinary obstruction 11/2013   • Chills (without fever)    • Chronic sinusitis    • Diabetes mellitus ([address])    • Disease of thyroid gland    • Enlarged prostate    • Fatty liver    • Headache    • Hematuria    • Hypertension    • Joint pain    • Kidney stone    • Legally blind in right eye, as defined in [place]     shadows only   • Nausea and vomiting    • Retention of urine    • Streptococcal sore throat    • Ureteral calculi    • Wheezing        Past Surgical History:   Procedure Laterality Date   • COLONOSCOPY  02/2010    by -colon polyps   • COLONOSCOPY  12/28/2017    Anupam Patterson M.D. / multiple polyps removed, 1.5 cm polyp ascending colon-tubulovillous adenoma, 20 mm polyp descending colon and 2 other smaller polyps all tubular adenomas   • COLONOSCOPY  02/20/2019    Debbie Kimball M.D. / 3 polyps-tubular adenomas, diverticulosis   • COLONOSCOPY  11/10/2022    8 polyps tubular adenomas, biopsy of ileocecal valve showed hyperplastic polyp by Dr. Radha Jeffrey   • CYSTOSCOPY  2013   • EYE SURGERY  2000   • IR BIOPSY BONE MARROW  09/23/2021   • KNEE ARTHROSCOPY Bilateral        Meds/Allergies:  all medications and allergies reviewed    Allergies: Allergies   Allergen Reactions   • Lisinopril Cough       Social History:  Marital Status: /Civil Union  Substance Use History:   Social History     Substance and Sexual Activity   Alcohol Use Yes   • Alcohol/week: 8.0 standard drinks of alcohol   • Types: 2 Cans of beer, 6 Standard drinks or equivalent per week    Comment: last drink Sat night 8/19     Social History     Tobacco Use   Smoking Status Never   Smokeless Tobacco Never     Social History     Substance and Sexual Activity   Drug Use Never       Family History:  Family History   Problem Relation Age of Onset   • Breast cancer Mother    • Cancer Father        Physical Exam:   Vitals:   Blood Pressure: 131/81 (08/22/23 0759)  Pulse: 76 (08/22/23 0759)  Temperature: 98.4 °F (36.9 °C) (08/22/23 0759)  Temp Source: Temporal (08/22/23 0759)  Respirations: 18 (08/22/23 0759)  Height: 5' 10" (177.8 cm) (08/21/23 0954)  Weight - Scale: 102 kg (225 lb 12 oz) (08/22/23 0535)  SpO2: 92 % (08/22/23 0759)    Physical Exam  Vitals and nursing note reviewed. Constitutional:       General: He is not in acute distress. Appearance: Normal appearance. He is well-developed. He is obese. He is not ill-appearing, toxic-appearing or diaphoretic. HENT:      Head: Normocephalic and atraumatic. Cardiovascular:      Rate and Rhythm: Normal rate and regular rhythm. Pulses:           Radial pulses are 2+ on the right side and 2+ on the left side. Dorsalis pedis pulses are 2+ on the right side and 2+ on the left side. Heart sounds: Normal heart sounds. No murmur heard. Pulmonary:      Effort: Pulmonary effort is normal. No respiratory distress. Breath sounds: Normal breath sounds. No wheezing, rhonchi or rales. Comments: Room air, nonlabored breathing  Abdominal:      General: Bowel sounds are normal. There is no distension. Palpations: Abdomen is soft. Tenderness:  There is no abdominal tenderness. Musculoskeletal:      Right lower leg: No edema. Left lower leg: No edema. Comments: Bilateral lower legs soft to palpation, nontender   Skin:     General: Skin is warm and dry. Neurological:      General: No focal deficit present. Mental Status: He is alert and oriented to person, place, and time. Psychiatric:         Mood and Affect: Mood normal.         Behavior: Behavior normal.        Additional Data:   Lab Results:    Results from last 7 days   Lab Units 08/22/23  0444   WBC Thousand/uL 7.49   HEMOGLOBIN g/dL 11.1*   HEMATOCRIT % 33.8*   PLATELETS Thousands/uL 156     Results from last 7 days   Lab Units 08/22/23  0444   SODIUM mmol/L 139   POTASSIUM mmol/L 3.8   CHLORIDE mmol/L 105   CO2 mmol/L 26   BUN mg/dL 18   CREATININE mg/dL 0.93   ANION GAP mmol/L 8   CALCIUM mg/dL 8.3*   GLUCOSE RANDOM mg/dL 117     Lab Results   Component Value Date/Time    HGBA1C 6.3 (H) 07/13/2023 09:34 AM    HGBA1C 7.2 (H) 05/31/2023 09:58 AM    HGBA1C 7.4 (H) 05/18/2023 10:56 AM     Results from last 7 days   Lab Units 08/22/23  0630 08/21/23  1800 08/21/23  1624 08/21/23  1528 08/21/23  1024   POC GLUCOSE mg/dl 161* 147* 154* 167* 155*     Imaging: No pertinent imaging reviewed. XR pelvis ap only 1 or 2 vw    (Results Pending)       EKG, Pathology, and Other Studies Reviewed on Admission:   · EKG: No EKG obtained. ** Please Note: This note may have been constructed using a voice recognition system.  **

## 2023-08-22 NOTE — ASSESSMENT & PLAN NOTE
Blood pressure adequately controlled  · Continue PTA Norvasc 10 mg, clonidine 0.1 mg twice daily  · Patient with adequate intake. Renal function stable.   Can resume home losartan 50 mg daily and hydrochlorothiazide 25 mg daily with close blood pressure monitoring and follow-up PCP on discharge

## 2023-08-22 NOTE — PROGRESS NOTES
Orthopaedic Surgery - Progress Note  Luciana Braga (4 y.o. male)   : 1955   MRN: [id number]  Date: 2023   Encounter: [id number]   Unit/Bed#: E2 [facility]-01    Assessment / Plan  Postop day 1 status post left total hip replacement using lateral approach    · Lateral approach no specific hip precautions. Weightbearing as tolerated left lower extremity. · Start PT/OT as ordered. · Aspirin 81 mg twice daily for DVT prophylaxis for 4 weeks total treatment. · Maintain Mepilex dressing. · Hemoglobin 11.1 this morning no signs of bleeding in the operative extremity drop secondary to acute blood loss we will continue to monitor at this time. · Ancef for 24 hours then transition to oral Duricef for 5 days upon discharge. · Patient will need outpatient follow-up as scheduled with Dr. Herman Tate 2 weeks postoperatively. Subjective  77-year-old male postop day 1 status post left total hip replacement using the lateral approach. Overall the patient is doing well he does have some discomfort around the operative area as expected. He denies any distal numbness or tingling at this time. His plan is for discharge home when cleared from a medical and therapy standpoint. Vitals  Temp:  [97 °F (36.1 °C)-98.4 °F (36.9 °C)] 98.4 °F (36.9 °C)  HR:  [70-80] 76  Resp:  [16-18] 18  BP: (109-166)/() 131/81  Body mass index is 32.39 kg/m². I/O last 24 hours: In:  [I.V.:]  Out: 525 [Urine:475; Blood:50]    Left Hip Exam  Alignment / Posture:  Normal resting hip posture. Inspection:  Mepilex dressings clean, dry and intact at this time. Mild ecchymosis around the operative area with mild swelling. Palpation:  Mild tenderness at Perioperative area. ROM:  Normal ankle ROM. Strength:  5/5 AT, GSC, PT, and peroneals. Stability:  Not tested. Tests:  No pertinent positive or negative tests. Neurovascular:  Sensation intact in DP/SP/Estrada/Sa/T nerve distributions.  Sensation intact in all digital nerve distributions. Toes warm and perfused. Gait:  Not tested.     Lab Results  (I have personally reviewed pertinent lab results.)  Results from last 7 days   Lab Units 08/22/23  0444   WBC Thousand/uL 7.49   HEMOGLOBIN g/dL 11.1*   HEMATOCRIT % 33.8*   PLATELETS Thousands/uL 156         Results from last 7 days   Lab Units 08/22/23  0444   POTASSIUM mmol/L 3.8   CHLORIDE mmol/L 105   CO2 mmol/L 26   BUN mg/dL 18   CREATININE mg/dL 0.93   EGFR ml/min/1.73sq m 85   CALCIUM mg/dL 8.3*               Samanta Daniels PA-C

## 2023-08-22 NOTE — ASSESSMENT & PLAN NOTE
Patient status post left total hip arthroplasty with lateral approach on 8/21/2023  · On aspirin 81 mg twice daily for DVT prophylaxis x4 weeks per orthopedic recommendations  · Would suggest GI prophylaxis as patient is also on Celebrex which is an NSAID. · Leg neurovascularly intact, reports adequate output. No signs/symptoms of bleeding. On room air.   · PT/OT  · Rest of care per primary

## 2023-08-22 NOTE — OP NOTE
OPERATIVE REPORT  PATIENT NAME: Mortimer Ram  : 1955  MRN: [id number]  Pt Location:  [facility] OR ROOM 02    Surgery Date: 2023    Surgeon(s) and Role:     * Francesco Adames, DO - Primary     * Jun Castellanos PA-C     Preop Diagnosis:  Primary osteoarthritis of one hip, left [M16.12]    Post-Op Diagnosis Codes:     * Primary osteoarthritis of one hip, left [M16.12]    Procedure(s):  Left - ARTHROPLASTY HIP TOTAL    Specimens:  * No specimens in log *    Estimated Blood Loss:   100 cc    Drains:  * No LDAs found *    Anesthesia Type:   General, attempted spinal      Operative Indications:  Primary osteoarthritis of one hip, left [M16.12]  68M with left hip pain 2/2 severe osteoarthritis. He has failed conservative management in the form of HEP, low-impact exercises, NSAIDs and activity modifications. His pain is affecting his ADLs. The patient has elected to proceed with left JACKELYN through the posterior approach. Risks and benefits of surgery to include but not limited to bleeding, infection, damage to surrounding structures, hardware failure, instability, fracture, dislocation, leg length inequality, need for further surgery, continued pain, stiffness, blood clots, stroke, heart attack was discussed with the patient. Operative Findings:  Severe osteoarthritis   Native femoral head 57 mm  Implant Name Type Inv.  Item Serial No.  Lot No. LRB No. Used Action   ACETABULAR SHELL 3HL 56MM CMNTLS EMPHASYS - [id number]  ACETABULAR SHELL 3HL 56MM CMNTLS EMPHASYS  DEPUY [id number] Left 1 Implanted   LINER AOX NEUTRAL 56-58 X 40MM EMPHASYS - [id number]  LINER AOX NEUTRAL 56-58 X 40MM EMPHASYS  DEPUY [id number] Left 1 Implanted   SCREW BETHANY 6.5 X 25MM SLF TAP PINNACLE - [id number]  SCREW BETHANY 6.5 X 25MM SLF TAP PINNACLE  DEPUY [id number] Left 1 Implanted   HEAD FEMORAL  TPR 40MM +1.5MM BIOLOX DELTA TS ARTICULEZE - [id number]  HEAD FEMORAL 14 TPR 40MM +1.5MM BIOLOX DELTA TS ARTICULEZE  DEPUY [id number] Left 1 Implanted   STEM FEM SZ 8 TAPER CMNTLS STD COLLAR ACTIS - [id number]  STEM FEM SZ 8 TAPER CMNTLS STD COLLAR ACTIS  DEPUY [id number] Left 1 Implanted       Complications:   None    Hip Approach: Direct lateral, modified hardinge     Procedure and Technique:  The patient seen in the preoperative area. The informed consent was confirmed. The operative site was confirmed and marked. Patient was taken to the operating room and anesthesia was performed by the anesthesiologist.  Once anesthesia was complete the patient was placed in the lateral decubitus position with the operative hip up held in place with stulberg hip positioners. All bony prominences were well-padded. The left lower extremity was prepped and draped in typical sterile fashion. Perioperative antibiotics were given per scip protocol prior to incision. A formal timeout was performed identifying the patient and surgical site. A lateral incision slightly anterior to the greater trochanter was made through skin and subcutaneous tissues down to the level of the ITB fascia. The ITB band was split in line with the greater trochanter. At this time a Charnley retractor was placed being careful to avoid neurovascular structures. A modified Migdalia Nissen approach was used taking down the anterior 1/3 of the gluteus minimus and medius reflecting it anterior off the capsule. Retractors were then carefully placed around the capsule. The anterior capsule was resected sharply. The hip was dislocated and femoral neck cut was made inline with the template. Acetabular retractors were carefully placed. The labrum was excised. Peripheral osteophytes were carefully removed with an osteotome. A 51 mm reamer was used to medialize to the medial wall. Sequential reamer was then performed up to a size 56 mm with excellent chatter. A 56 mm Emphasys cluster hole cup was impacted into place aiming for abduction of 40 degrees and anteversion of 15 degrees.  The cup was confirmed to be well seated. An acetabular screw was placed to augment fixation up the posterior column. The netural liner was impacted into place and assured to be well-seated. At this time acetabular retractors were removed. The proximal femur was now exposed. A canal finder was used followed by a box osteotome. The femur was sequentially broached up to a size 8 actis with excellent rotational stability. The calcar planner was then used. The hip was trialed with a std + 1.5 with excellent stability in external rotation, flexion to 90 with IR of 70 and restoration of leg lengths. The trials were removed and the femur was irrigated. The real size 8 std Actis stem was impacted into the femur. The real 40 mm +1.5 TS ceramic head was impacted onto a clean, dried trunion. The hip was then taken through range of motion and found have excellent stability. The hip was irrigated with Irrisept solution followed by normal saline solution. The abductors were at this time repaired with #1 vicryl suture to the greater trochanter through figure-of-eight sutures and bone tunnels. This was then reinforced with running #1 Stratafix. The ITB band was closed with 1. Stratafix. The subcutaneous tissues closed with interrupted 2-0 Vicryl. The skin was closed with 3-0 Stratafix suture followed by exofin. All instrument counts and sponge counts were correct. Patient was awoken from anesthesia in stable condition when taken to the recovery room.      I was present for the entire procedure, A qualified resident physician was not available and A physician assistant was required during the procedure for retraction tissue handling,dissection and suturing    Patient Disposition:  PACU      68M s/p L JACKELYN 8/22  - multi-modal pain control  - periop abx x24 hrs, home on duricef x 5 days  - DVT ppx: aspirin 81mg BID x 4 weeks   - PT/OT  - WBAT  - no hip precautions  - f/u 10-14 days       SIGNATURE: Clare Najera, DO  DATE: August 22, 2023  TIME: 12:21 PM

## 2023-08-22 NOTE — PLAN OF CARE
Problem: OCCUPATIONAL THERAPY ADULT  Goal: Performs self-care activities at highest level of function for planned discharge setting. See evaluation for individualized goals. Description: Treatment Interventions: ADL retraining, Functional transfer training, UE strengthening/ROM, Endurance training, Patient/family training, Equipment evaluation/education, Neuromuscular reeducation, Compensatory technique education, Energy conservation, Activityengagement          See flowsheet documentation for full assessment, interventions and recommendations. Note: Limitation: Decreased ADL status, Decreased Safe judgement during ADL, Decreased endurance, Decreased self-care trans, Decreased high-level ADLs  Prognosis: Good  Assessment: Jose Guadalupe Perdue is a 79 y.o. male seen for OT evaluation s/p admit to [facility] on 8/21/2023 w/ Osteoarthritis of left hip. Postop day 1 status post left total hip replacement using lateral approach. LLE WBAT- no restrictions. Comorbidities affecting pt's functional performance at time of assessment include: DM and HTN. Pt with active OT orders and activity orders for Activity as tolerated. Personal factors affecting pt at time of IE include:difficulty performing ADLs, difficulty performing IADLs and difficulty performing transfers/mobility. Pt reports he will be staying with  following dc from hospital. Sister has a single level home with 0 YANICK. Home has a tub shower, standard toilets. No DME. Was I with ADLs, IADLs, and mobility with no device. Owns a RW. Drives. Works as an [employer]. Reports 0 falls in the past 6 months. Upon evaluation: Pt currently requires [place]rish for UB ADLs, Al[place] for LB ADLs, [place] for toileting, supervision for bed mobility, supervision for functional transfers, and supervision with RW mobility 2* the following deficits impacting occupational performance:weakness, decreased strength , decreased balance and decreased activity tolerance.  VITALS during session: seated /86> post activity 132/87. Pt to benefit from continued skilled OT tx while in the hospital to address deficits as defined above and maximize level of fu nctional independence w ADL's and functional mobility. Occupational Performance areas to address include: bathing/shower, toilet hygiene, dressing, health maintenance, functional mobility and clothing management. From OT standpoint, recommendation at time of d/c would be home with OPPT. OT to follow pt on caseload 3-5x/wk.      OT Discharge Recommendation: Home with outpatient rehabilitation

## 2023-08-22 NOTE — PLAN OF CARE
Problem: MOBILITY - ADULT  Goal: Maintain or return to baseline ADL function  Description: INTERVENTIONS:  -  Assess patient's ability to carry out ADLs; assess patient's baseline for ADL function and identify physical deficits which impact ability to perform ADLs (bathing, care of mouth/teeth, toileting, grooming, dressing, etc.)  - Assess/evaluate cause of self-care deficits   - Assess range of motion  - Assess patient's mobility; develop plan if impaired  - Assess patient's need for assistive devices and provide as appropriate  - Encourage maximum independence but intervene and supervise when necessary  - Involve family in performance of ADLs  - Assess for home care needs following discharge   - Consider OT consult to assist with ADL evaluation and planning for discharge  - Provide patient education as appropriate  Outcome: Progressing  Goal: Maintains/Returns to pre admission functional level  Description: INTERVENTIONS:  - Perform BMAT or MOVE assessment daily.   - Set and communicate daily mobility goal to care team and patient/family/caregiver. - Collaborate with rehabilitation services on mobility goals if consulted  - Perform Range of Motion 3 times a day.   - Dangle patient 3 times a day  - Stand patient 3 times a day  - Ambulate patient 3 times a day  - Out of bed to chair 3 times a day   - Out of bed for meals 3  Problem: PAIN - ADULT  Goal: Verbalizes/displays adequate comfort level or baseline comfort level  Description: Interventions:  - Encourage patient to monitor pain and request assistance  - Assess pain using appropriate pain scale  - Administer analgesics based on type and severity of pain and evaluate response  - Implement non-pharmacological measures as appropriate and evaluate response  - Consider cultural and social influences on pain and pain management  - Notify physician/advanced practitioner if interventions unsuccessful or patient reports new pain  Outcome: Progressing     Problem: INFECTION - ADULT  Goal: Absence or prevention of progression during hospitalization  Description: INTERVENTIONS:  - Assess and monitor for signs and symptoms of infection  - Monitor lab/diagnostic results  - Monitor all insertion sites, i.e. indwelling lines, tubes, and drains  - Monitor endotracheal if appropriate and nasal secretions for changes in amount and color  - [place] appropriate cooling/warming therapies per order  - Administer medications as ordered  - Instruct and encourage patient and family to use good hand hygiene technique  - Identify and instruct in appropriate isolation precautions for identified infection/condition  Outcome: Progressing     Problem: Knowledge Deficit  Goal: Patient/family/caregiver demonstrates understanding of disease process, treatment plan, medications, and discharge instructions  Description: Complete learning assessment and assess knowledge base.   Interventions:  - Provide teaching at level of understanding  - Provide teaching via preferred learning methods  Outcome: Progressing     Problem: MUSCULOSKELETAL - ADULT  Goal: Maintain or return mobility to safest level of function  Description: INTERVENTIONS:  - Assess patient's ability to carry out ADLs; assess patient's baseline for ADL function and identify physical deficits which impact ability to perform ADLs (bathing, care of mouth/teeth, toileting, grooming, dressing, etc.)  - Assess/evaluate cause of self-care deficits   - Assess range of motion  - Assess patient's mobility  - Assess patient's need for assistive devices and provide as appropriate  - Encourage maximum independence but intervene and supervise when necessary  - Involve family in performance of ADLs  - Assess for home care needs following discharge   - Consider OT consult to assist with ADL evaluation and planning for discharge  - Provide patient education as appropriate  Outcome: Progressing  Goal: Maintain proper alignment of affected body part  Description: INTERVENTIONS:  - Support, maintain and protect limb and body alignment  - Provide patient/ family with appropriate education  Outcome: Progressing    times a day  - Out of bed for toileting  - Record patient progress and toleration of activity level   Outcome: Progressing

## 2023-08-22 NOTE — ASSESSMENT & PLAN NOTE
Noted with hemoglobin drop of 14.9-11.1  · In setting of postoperative state and hemodilution with fluids  · No signs or symptoms of bleeding on exam.  Hemodynamically stable. · We will obtain iron panel.   If patient is to discharge home would suggest repeat CBC in 1 week with primary

## 2023-08-22 NOTE — PLAN OF CARE
Problem: PAIN - ADULT  Goal: Verbalizes/displays adequate comfort level or baseline comfort level  Description: Interventions:  - Encourage patient to monitor pain and request assistance  - Assess pain using appropriate pain scale  - Administer analgesics based on type and severity of pain and evaluate response  - Implement non-pharmacological measures as appropriate and evaluate response  - Consider cultural and social influences on pain and pain management  - Notify physician/advanced practitioner if interventions unsuccessful or patient reports new pain  Outcome: Progressing     Problem: INFECTION - ADULT  Goal: Absence or prevention of progression during hospitalization  Description: INTERVENTIONS:  - Assess and monitor for signs and symptoms of infection  - Monitor lab/diagnostic results  - Monitor all insertion sites, i.e. indwelling lines, tubes, and drains  - Monitor endotracheal if appropriate and nasal secretions for changes in amount and color  - [place] appropriate cooling/warming therapies per order  - Administer medications as ordered  - Instruct and encourage patient and family to use good hand hygiene technique  - Identify and instruct in appropriate isolation precautions for identified infection/condition  Outcome: Progressing     Problem: SAFETY ADULT  Goal: Maintain or return to baseline ADL function  Description: INTERVENTIONS:  -  Assess patient's ability to carry out ADLs; assess patient's baseline for ADL function and identify physical deficits which impact ability to perform ADLs (bathing, care of mouth/teeth, toileting, grooming, dressing, etc.)  - Assess/evaluate cause of self-care deficits   - Assess range of motion  - Assess patient's mobility; develop plan if impaired  - Assess patient's need for assistive devices and provide as appropriate  - Encourage maximum independence but intervene and supervise when necessary  - Involve family in performance of ADLs  - Assess for home care needs following discharge   - Consider OT consult to assist with ADL evaluation and planning for discharge  - Provide patient education as appropriate  Outcome: Progressing  Goal: Patient will remain free of falls  Description: INTERVENTIONS:  - Educate patient/family on patient safety including physical limitations  - Instruct patient to call for assistance with activity   - Consult OT/PT to assist with strengthening/mobility   - Keep Call bell within reach  - Keep bed low and locked with side rails adjusted as appropriate  - Keep care items and personal belongings within reach  - Initiate and maintain comfort rounds  - Make Fall Risk Sign visible to staff  - Offer Toileting every 2 Hours, in advance of need  - Initiate/Maintain bed alarm  - Obtain necessary fall risk management equipment: gripper socks, call bell  - Apply yellow socks and bracelet for high fall risk patients  - Consider moving patient to room near nurses station  Outcome: Progressing     Problem: DISCHARGE PLANNING  Goal: Discharge to home or other facility with appropriate resources  Description: INTERVENTIONS:  - Identify barriers to discharge w/patient and caregiver  - Arrange for needed discharge resources and transportation as appropriate  - Identify discharge learning needs (meds, wound care, etc.)  - Arrange for interpretive services to assist at discharge as needed  - Refer to Case Management Department for coordinating discharge planning if the patient needs post-hospital services based on physician/advanced practitioner order or complex needs related to functional status, cognitive ability, or social support system  Outcome: Progressing     Problem: Knowledge Deficit  Goal: Patient/family/caregiver demonstrates understanding of disease process, treatment plan, medications, and discharge instructions  Description: Complete learning assessment and assess knowledge base.   Interventions:  - Provide teaching at level of understanding  - Provide teaching via preferred learning methods  Outcome: Progressing     Problem: MUSCULOSKELETAL - ADULT  Goal: Maintain or return mobility to safest level of function  Description: INTERVENTIONS:  - Assess patient's ability to carry out ADLs; assess patient's baseline for ADL function and identify physical deficits which impact ability to perform ADLs (bathing, care of mouth/teeth, toileting, grooming, dressing, etc.)  - Assess/evaluate cause of self-care deficits   - Assess range of motion  - Assess patient's mobility  - Assess patient's need for assistive devices and provide as appropriate  - Encourage maximum independence but intervene and supervise when necessary  - Involve family in performance of ADLs  - Assess for home care needs following discharge   - Consider OT consult to assist with ADL evaluation and planning for discharge  - Provide patient education as appropriate  Outcome: Progressing  Goal: Maintain proper alignment of affected body part  Description: INTERVENTIONS:  - Support, maintain and protect limb and body alignment  - Provide patient/ family with appropriate education  Outcome: Progressing

## 2023-08-22 NOTE — OCCUPATIONAL THERAPY NOTE
Occupational Therapy Evaluation     Patient Name: Luciana KIRANH Date: 8/22/2023  Problem List  Principal Problem:    Osteoarthritis of left hip  Active Problems:    BPH without obstruction/lower urinary tract symptoms    Class 1 obesity due to excess calories without serious comorbidity with body mass index (BMI) of 32.0 to 32.9 in adult    Legally blind in right eye, as defined in [facility]    Hypertension    Prediabetes    Past Medical History  Past Medical History:   Diagnosis Date    Abdominal pain     Acute infection of external ear     BPH with obstruction/lower urinary tract symptoms     BPH without urinary obstruction 11/2013    Chills (without fever)     Chronic sinusitis     Diabetes mellitus ([address])     Disease of thyroid gland     Enlarged prostate     Fatty liver     Headache     Hematuria     Hypertension     Joint pain     Kidney stone     Legally blind in right eye, as defined in [facility]     shadows only    Nausea and vomiting     Retention of urine     Streptococcal sore throat     Ureteral calculi     Wheezing      Past Surgical History  Past Surgical History:   Procedure Laterality Date    COLONOSCOPY  02/2010    by -colon polyps    COLONOSCOPY  12/28/2017    Anupam Koo M.D. / multiple polyps removed, 1.5 cm polyp ascending colon-tubulovillous adenoma, 20 mm polyp descending colon and 2 other smaller polyps all tubular adenomas    COLONOSCOPY  02/20/2019    Gucci Spann M.D. / 3 polyps-tubular adenomas, diverticulosis    COLONOSCOPY  11/10/2022    8 polyps tubular adenomas, biopsy of ileocecal valve showed hyperplastic polyp by Dr. Ashok Steward  2013    [facility]    IR BIOPSY BONE MARROW  09/23/2021    KNEE ARTHROSCOPY Bilateral          08/22/23 0810   OT Last Visit   OT Visit Date 08/22/23   Note Type   Note type Evaluation   Additional Comments Pt greeted in supine and agreeable to skilled OT eval   Pain Assessment   Pain Assessment Tool 0-10   Pain Score 4   Pain Location/Orientation Orientation: Left; Location: Leg   Restrictions/Precautions   Weight Bearing Precautions Per Order Yes   LLE Weight Bearing Per Order WBAT   Other Precautions WBS; Fall Risk;Pain;Multiple lines   Home Living   Type of [facility]  One level  (0 YANICK)   Bathroom Shower/Tub Tub/shower unit   Bathroom Toilet Standard   Bathroom Equipment   (no DME)   Home Equipment Walker   Prior Function   Level of [place] Independent with ADLs; Independent with functional mobility; Independent with IADLS   Lives With Alone  (usually lives alone but will be staying with sister during recovery.)   Receives Help From Family   IADLs Independent with driving; Independent with meal prep; Independent with medication management   Falls in the last 6 months 0   Vocational Full time employment   Comments Pt reports he will be staying with sister following dc from hospital. Sister has a single level home with 0 YANICK. Home has a tub shower, standard toilets. No DME. Was I with ADLs, IADLs, and mobility with no device. 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 Reports 0 falls in the past 6 months. ADL   Where Assessed Edge of bed   Eating Assistance 6  Modified independent   Grooming Assistance 6  Modified Independent   UB Bathing Assistance 6  Modified Independent   LB Bathing Assistance 4  Minimal Assistance   UB Dressing Assistance 6  Modified independent   LB Dressing Assistance 4  Minimal Assistance   Toileting Assistance  4  Minimal Assistance   Bed Mobility   Supine to Sit 5  Supervision   Sit to Supine 5  Supervision   Transfers   Sit to Stand 5  Supervision   Additional items Assist x 1; Increased time required;Verbal cues   Stand to Sit 5  Supervision   Additional items Assist x 1; Increased time required;Verbal cues   Additional Comments cues for hand placement safety and best tech.    Functional Mobility   Functional Mobility 5  Supervision   Additional Comments assist x1, RW household distances   Additional items Rolling walker   Balance   Static Sitting Good   Dynamic Sitting Fair +   Static Standing Fair   Dynamic Standing [address] [facility] -   Activity Tolerance   Activity Tolerance Patient tolerated treatment well;Patient limited by pain   Medical Staff Made Aware PT Petrona Shadow. SPT Martha Stienberg   Nurse Made Aware RN Itzel Bush Assessment   RUE Assessment WFL   LUE Assessment   LUE Assessment WFL   Hand Function   Gross Motor Coordination Functional   Fine Motor Coordination Functional   Vision-Basic Assessment   Current Vision No visual deficits   Psychosocial   Psychosocial (WDL) WDL   Cognition   Overall Cognitive Status WFL   Arousal/Participation Cooperative   Attention Within functional limits   Orientation Level Oriented X4   Memory Within functional limits   Following Commands Follows all commands and directions without difficulty   Comments cooperative. Assessment   Limitation Decreased ADL status; Decreased Safe judgement during ADL;Decreased endurance;Decreased self-care trans;Decreased high-level ADLs   Prognosis Good   Assessment George Rivero is a 79 y.o. male seen for OT evaluation s/p admit to [facility] on 8/21/2023 w/ Osteoarthritis of left hip. Postop day 1 status post left total hip replacement using lateral approach. LLE WBAT- no restrictions. Comorbidities affecting pt's functional performance at time of assessment include: DM and HTN. Pt with active OT orders and activity orders for Activity as tolerated. Personal factors affecting pt at time of IE include:difficulty performing ADLs, difficulty performing IADLs and difficulty performing transfers/mobility. Pt reports he will be staying with  following dc from hospital. Sister has a single level home with 0 YANICK. Home has a tub shower, standard toilets. No DME. Was I with ADLs, IADLs, and mobility with no device. 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 Reports 0 falls in the past 6 months.   Upon evaluation: Pt currently requires [place] for UB ADLs, [place] for LB ADLs, [place] for toileting, supervision for bed mobility, supervision for functional transfers, and supervision with RW mobility 2* the following deficits impacting occupational performance:weakness, decreased strength , decreased balance and decreased activity tolerance. VITALS during session: seated /86> post activity 132/87. Pt to benefit from continued skilled OT tx while in the hospital to address deficits as defined above and maximize level of fu nctional independence w ADL's and functional mobility. Occupational Performance areas to address include: bathing/shower, toilet hygiene, dressing, health maintenance, functional mobility and clothing management. From OT standpoint, recommendation at time of d/c would be home with OPPT. OT to follow pt on caseload 3-5x/wk. Goals   Patient Goals to get back to work/ drive   STG Time Frame 3-5   Short Term Goal #1 Pt will improve activity tolerance to G for min 30 min txment sessions for increase engagement in functional tasks   Short Term Goal #2 Pt will complete LB dressing/self care w/ mod I using adaptive device and DME as needed   Short Term Goal  Pt will complete toileting w/ mod I w/ G hygiene/thoroughness using DME as needed   LTG Time Frame 10-14   Long Term Goal #1 Pt will improve functional transfers to Mod I on/off all surfaces using DME as needed w/ G balance/safety   Long Term Goal #2 Pt will improve functional mobility during ADL/IADL/leisure tasks to Mod I using DME as needed w/ G balance/safety   Long Term Goal Pt will participate in simulated IADL management task to increase independence to Mod I w/ G safety and endurance   Plan   Treatment Interventions ADL retraining;Functional transfer training;UE strengthening/ROM; Endurance training;Patient/family training;Equipment evaluation/education; Neuromuscular reeducation; Compensatory technique education; Energy conservation; Activityengagement   Goal Expiration Date 09/05/23   OT Treatment Day 0   OT Frequency 3-5x/wk   Recommendation   OT Discharge Recommendation Home with outpatient rehabilitation   AM-PAC Daily Activity Inpatient   Lower Body Dressing 2   Bathing 3   Toileting 3   Upper Body Dressing 4   Grooming 4   Eating 4   Daily Activity Raw Score 20   Daily Activity Standardized Score (Calc for Raw Score >=11) 42.03   AM-PAC Applied Cognition Inpatient   Following a Speech/Presentation 4   Understanding Ordinary Conversation 4   Taking Medications 4   Remembering Where Things Are Placed or Put Away 4   Remembering List of 4-5 Errands 4   Taking Care of Complicated Tasks 4   Applied Cognition Raw Score 24   Applied Cognition Standardized Score 62.21   Jie Guzman, OT

## 2023-08-23 ENCOUNTER — TELEPHONE (OUTPATIENT)
Dept: OBGYN CLINIC | Facility: HOSPITAL | Age: 68
End: 2023-08-23

## 2023-08-24 ENCOUNTER — OFFICE VISIT (OUTPATIENT)
Dept: PHYSICAL THERAPY | Facility: REHABILITATION | Age: 68
End: 2023-08-24
Payer: MEDICARE

## 2023-08-24 DIAGNOSIS — M25.552 PAIN OF LEFT HIP: Primary | ICD-10-CM

## 2023-08-24 DIAGNOSIS — Z47.1 AFTERCARE FOLLOWING LEFT HIP JOINT REPLACEMENT SURGERY: ICD-10-CM

## 2023-08-24 DIAGNOSIS — Z96.642 AFTERCARE FOLLOWING LEFT HIP JOINT REPLACEMENT SURGERY: ICD-10-CM

## 2023-08-24 PROCEDURE — 97164 PT RE-EVAL EST PLAN CARE: CPT | Performed by: PHYSICAL THERAPIST

## 2023-08-24 PROCEDURE — 97110 THERAPEUTIC EXERCISES: CPT | Performed by: PHYSICAL THERAPIST

## 2023-08-24 NOTE — TELEPHONE ENCOUNTER
Patient contacted for a postoperative follow up assessment. Patient reports doing         "sore" and "with some pain." Patient is ambulating with the RW, and reports current 5/10 pain. Patient denies increase in swelling and dressing is clean, dry and intact. Patient educated to continue icing the site regularly. Pt had outpatient PT today. We reviewed patients AVS medication list. Patient is taking Tylenol 1000mg every 8 hours, Oxycodone 5mg every 4 hours,Duricef BID,  ASA 81mg BID, and Senna daily. Patient has not yet had a BM, we discussed increasing fluid and fiber intake. He is not using the Zofran. He reports his sister is managing his medication     Patient denies nausea, vomiting, abdominal pain, chest pain, shortness of breath, fever, dizziness and calf pain. Patient does not have any other questions or concerns at this time. Pt was encouraged to call with any questions, concerns or issues.

## 2023-08-24 NOTE — PROGRESS NOTES
PT Re-Evaluation     Today's date: 2023  Patient name: Claudene Broody  : 1955  MRN: [id number]  Referring provider: Shekhar Richards PA-C  Dx:   Encounter Diagnosis     ICD-10-CM    1. Pain of left hip  M25.552       2. Aftercare following left hip joint replacement surgery  Z47.1     Z96.642                      Assessment  Assessment details: Pt is a pleasant 79 y.o. male presenting to outpatient physical therapy s/p L JACKELYN . Pt presents with pain, decreased range of motion, decreased strength, abnormal gait, and decreased tolerance to activity. Pt educated on nature of procedure, associated precautions, and expected rehabilitation progression. Issued patient HEP. Pt would benefit from skilled physical therapy to address limitations and to achieve goals. Thank you for this referral.   Impairments: abnormal coordination, abnormal gait, abnormal or restricted ROM, activity intolerance, impaired balance, impaired physical strength and pain with function  Understanding of Dx/Px/POC: good   Prognosis: good    Goals  ST. Patient will report 25% decrease in pain in 4 weeks. 2. Patient will demonstrate 25% improvement in ROM in 4 weeks. 3. Patient will demonstrate 1/2 grade improvement in strength in 4 weeks. 4. Patient will be able to independently ambulate 250+ feet with SPC in 4 weeks. LT. Patient will be able to perform IADLS without restriction or pain by discharge. 2. Patient will be independent in HEP by discharge. 3. Patient will be able to return to recreational/work duties without restriction or pain by discharge. 4. Patient will be able to ambulate community distances without compensation by discharge.      Plan  Patient would benefit from: PT eval and skilled PT  Planned modality interventions: cryotherapy  Planned therapy interventions: IADL retraining, body mechanics training, flexibility, functional ROM exercises, home exercise program, neuromuscular re-education, manual therapy, postural training, strengthening, stretching, therapeutic activities, therapeutic exercise, gait training, transfer training, self care, patient education, balance/weight bearing training and ADL retraining  Frequency: 2x week  Duration in visits: 20  Duration in weeks: 12  Treatment plan discussed with: patient        Subjective Evaluation    History of Present Illness  Mechanism of injury: 06/05/23  Pt comes to therapy for pre-op visit for scheduled left JACKELYN (7/31/23). States he has had ongoing hip pain for years, finally deciding to have surgery to remedy issues. Notes he lives alone in a single story home, with a full flight of steps to the basement, which he frequents. Reports he has a tub shower, with no chair or grab bars. No steps to enter the home. States he will have the ability to live with his sister nearby if needed, otherwise, plans to have his sister aid with transportation, groceries, etc.   Notes his occupation is a  and [employer] which he plans to return to in approx 2-3 weeks following surgery. States his goals following surgery are to be able to function pain free and return to work. 08/24/23  Pt comes to therapy s/p L JACKELYN. States he had to have the surgery pushed back two times due to other circumstances, however, eventually had the surgery on 8/21. Notes he was released the following evening. Reports he has been staying at his sister's house, which is arranged for single story living. Reports there is one step to enter the home through the garage. Comes to therapy with RW, however, needed to be adjusted for height and with proper wheel positioning. Notes he has been taking his oxycodone and Aspirin as prescribed. Admits he has not applied ice to his hip as of yet. Follows up with surgeon on 9/5/23.    Patient Goals  Patient goals for therapy: decreased pain, increased motion, increased strength, independence with ADLs/IADLs and return to work          Objective     Passive Range of Motion   Left Hip   Flexion: 95 degrees   Abduction: 20 degrees              Precautions: HTN  **No HIP PRECAUTIONS**    Daily Treatment Diary    Date 6/5 8/24           FOTO nv            Re-Eval Pre-op HELENE              Manuals    PROM hip  HELENE                                                  Neuro Re-Ed                                                                                                Ther Ex                 Mini squats             Stand hip abd, ext             HR/TR             Standing gastroc stretch             Knee flex/ext stretch on step                          Glute sets  10"x10           Quad sets   10"x10           Heel slides - hip/knee flex  10"x10 c strap           Hip abd heel slides             Bridges             SAQ                          Ther Activity    Bike                           Gait Training                              Modalities    CP PRN

## 2023-08-28 ENCOUNTER — OFFICE VISIT (OUTPATIENT)
Dept: PHYSICAL THERAPY | Facility: REHABILITATION | Age: 68
End: 2023-08-28
Payer: MEDICARE

## 2023-08-28 DIAGNOSIS — Z47.1 AFTERCARE FOLLOWING LEFT HIP JOINT REPLACEMENT SURGERY: ICD-10-CM

## 2023-08-28 DIAGNOSIS — Z96.642 AFTERCARE FOLLOWING LEFT HIP JOINT REPLACEMENT SURGERY: ICD-10-CM

## 2023-08-28 DIAGNOSIS — M25.552 PAIN OF LEFT HIP: Primary | ICD-10-CM

## 2023-08-28 PROCEDURE — 97110 THERAPEUTIC EXERCISES: CPT | Performed by: PHYSICAL THERAPIST

## 2023-08-28 PROCEDURE — 97140 MANUAL THERAPY 1/> REGIONS: CPT | Performed by: PHYSICAL THERAPIST

## 2023-08-28 NOTE — PROGRESS NOTES
Daily Note     Today's date: 2023  Patient name: Kelly King  : 1955  MRN: [id number]  Referring provider: Rk Bowen PA-C  Dx:   Encounter Diagnosis     ICD-10-CM    1. Pain of left hip  M25.552       2. Aftercare following left hip joint replacement surgery  Z47.1     Z96.642                      Subjective: Pt comes to therapy stating he returned home this weekend. Notes he drove himself to therapy, without issue. Ambulates into the clinic with RW.       Objective: See treatment diary below      Assessment: Tolerated treatment well. Cues for set up and proper form. Patient demonstrated fatigue post treatment, exhibited good technique with therapeutic exercises and would benefit from continued PT      Plan: Progress treatment as tolerated.        Precautions: HTN  **No HIP PRECAUTIONS**    Daily Treatment Diary    Date           FOTO nv            Re-Eval Pre-op HELENE              Manuals    PROM hip  HELENE HELENE                                                 Neuro Re-Ed                                                                                                Ther Ex                 Mini squats   5"x10          Stand hip abd, ext   5" 2x10 B          HR/TR   2x10 ea          Standing gastroc stretch   30"x3                       Glute sets  10"x10 10"x10          Quad sets   10"x10 10"x10          Heel slides - hip/knee flex  10"x10 c strap 10"x10 c strap          Hip abd heel slides   10"x10          Bridges             SAQ                          Ther Activity    Bike    5'                       Gait Training                              Modalities    CP PRN   10'

## 2023-08-31 ENCOUNTER — OFFICE VISIT (OUTPATIENT)
Dept: PHYSICAL THERAPY | Facility: REHABILITATION | Age: 68
End: 2023-08-31
Payer: MEDICARE

## 2023-08-31 DIAGNOSIS — Z96.642 AFTERCARE FOLLOWING LEFT HIP JOINT REPLACEMENT SURGERY: ICD-10-CM

## 2023-08-31 DIAGNOSIS — Z47.1 AFTERCARE FOLLOWING LEFT HIP JOINT REPLACEMENT SURGERY: ICD-10-CM

## 2023-08-31 DIAGNOSIS — M25.552 PAIN OF LEFT HIP: Primary | ICD-10-CM

## 2023-08-31 PROCEDURE — 97110 THERAPEUTIC EXERCISES: CPT | Performed by: PHYSICAL THERAPIST

## 2023-08-31 NOTE — PROGRESS NOTES
Daily Note     Today's date: 2023  Patient name: Zaheer Israel  : 1955  MRN: [id number]  Referring provider: Gretel Carlin PA-C  Dx:   Encounter Diagnosis     ICD-10-CM    1. Pain of left hip  M25.552       2. Aftercare following left hip joint replacement surgery  Z47.1     Q77.393                      Subjective: Pt comes to therapy denying pain or discomfort. Denies discomfort following last treatment session. Objective: See treatment diary below      Assessment: Tolerated treatment well. Patient demonstrated fatigue post treatment, exhibited good technique with therapeutic exercises and would benefit from continued PT      Plan: Progress treatment as tolerated.        Precautions: HTN  **No HIP PRECAUTIONS**    Daily Treatment Diary    Date          FOTO nv            Re-Eval Pre-op HELENE              Manuals    PROM hip  HELENE HELENE HELENE                                                Neuro Re-Ed                                                                                                Ther Ex                 Mini squats   5"x10 5" 2x10         Stand hip abd, ext   5" 2x10 B 5" 2x10 ea B         HR/TR   2x10 ea 2x10 ea         Standing gastroc stretch   30"x3 HEP                      Glute sets  10"x10 10"x10 HEP         Quad sets   10"x10 10"x10 HEP         Heel slides - hip/knee flex  10"x10 c strap 10"x10 c strap 5" 2x10         Hip abd heel slides   10"x10 nv         Bridges    5" 2x10         SAQ    4# 5" 2x10                      Ther Activity    Bike    5' 8'                      Gait Training                              Modalities    CP PRN   10' 10'

## 2023-09-05 ENCOUNTER — OFFICE VISIT (OUTPATIENT)
Dept: PHYSICAL THERAPY | Facility: REHABILITATION | Age: 68
End: 2023-09-05
Payer: MEDICARE

## 2023-09-05 ENCOUNTER — OFFICE VISIT (OUTPATIENT)
Dept: OBGYN CLINIC | Facility: MEDICAL CENTER | Age: 68
End: 2023-09-05

## 2023-09-05 ENCOUNTER — APPOINTMENT (OUTPATIENT)
Dept: RADIOLOGY | Facility: MEDICAL CENTER | Age: 68
End: 2023-09-05
Payer: MEDICARE

## 2023-09-05 VITALS
HEART RATE: 77 BPM | WEIGHT: 225.1 LBS | BODY MASS INDEX: 32.22 KG/M2 | HEIGHT: 70 IN | DIASTOLIC BLOOD PRESSURE: 88 MMHG | SYSTOLIC BLOOD PRESSURE: 134 MMHG

## 2023-09-05 DIAGNOSIS — Z96.642 STATUS POST TOTAL REPLACEMENT OF LEFT HIP: Primary | ICD-10-CM

## 2023-09-05 DIAGNOSIS — M25.552 PAIN OF LEFT HIP: Primary | ICD-10-CM

## 2023-09-05 DIAGNOSIS — Z96.642 STATUS POST TOTAL REPLACEMENT OF LEFT HIP: ICD-10-CM

## 2023-09-05 PROCEDURE — 99024 POSTOP FOLLOW-UP VISIT: CPT | Performed by: STUDENT IN AN ORGANIZED HEALTH CARE EDUCATION/TRAINING PROGRAM

## 2023-09-05 PROCEDURE — 97110 THERAPEUTIC EXERCISES: CPT

## 2023-09-05 PROCEDURE — 73502 X-RAY EXAM HIP UNI 2-3 VIEWS: CPT

## 2023-09-05 NOTE — PROGRESS NOTES
Subjective: Patient seen and examined. Pain controlled. Progressing well. He has begun physical therapy which he notes is going well. He is currently ambulating with a walker. Incision without drainage. Denies fevers or chills. Physical Exam:  Incision: CDI no erythema or drainage, small scab proximal incision   ROM:normal post-op motion   5/5 IP/Q/HS/TA/GS, 2+ DP/PT, SILT DP/SP/S/S/TN    XR left hip: s/p press-fit total hip arthroplasty, components in good position. No fracture or dislocation    Assessment/Plan:  79year old male 2 weeks s/p left total hip arthroplasty    - continue multi-modal pain control   - Weight bearing status: WBAT  - DVT ppx: aspirin 81mg BID- 4 weeks  - Incision care:  May shower, do not submerge or soak  - PT/OT  - F/U 4 weeks    Scribe Attestation    I,:  Rodney Senior am acting as a scribe while in the presence of the attending physician.:       I,:  Aftab Perez, DO personally performed the services described in this documentation    as scribed in my presence.:

## 2023-09-05 NOTE — PROGRESS NOTES
Daily Note     Today's date: 2023  Patient name: Silvia Shafer  : 1955  MRN: [id number]  Referring provider: Donn Clark PA-C  Dx:   Encounter Diagnosis     ICD-10-CM    1. Pain of left hip  M25.552           Start Time: 1305  Stop Time: 1350  Total time in clinic (min): 45 minutes    Subjective: Patient reports he saw surgeon today for followup. Objective: See treatment diary below      Assessment: Patient tolerated treatment well but had left hip discomfort when supine. Patient unable to relax with gentle PROM despite cueing. Pt will benefit from continued skilled PT intervention in order to address remaining limitations and to restore maximal function. No increased pain reported post treatment. Plan: Continue per plan of care.       Precautions: HTN  **No HIP PRECAUTIONS**    Daily Treatment Diary    Date         FOTO nv            Re-Eval Pre-op HELENE              Manuals    PROM hip  HELENE HELENE HELENE JG                                               Neuro Re-Ed                                                                                                Ther Ex                 Mini squats   5"x10 5" 2x10 5" 2x10         Stand hip abd, ext   5" 2x10 B 5" 2x10 ea B 5" 2x10 ea        HR/TR   2x10 ea 2x10 ea 2x10 ea        Standing gastroc stretch   30"x3 HEP                      Glute sets  10"x10 10"x10 HEP         Quad sets   10"x10 10"x10 HEP         Heel slides - hip/knee flex  10"x10 c strap 10"x10 c strap 5" 2x10 5" 2x10        Hip abd heel slides   10"x10 nv x10         Bridges    5" 2x10 5" 2x10        SAQ    4# 5" 2x10 4# 5" 2x10                     Ther Activity    Bike    5' 8' 8'                     Gait Training                              Modalities    CP PRN   10' 10' 10'

## 2023-09-07 ENCOUNTER — OFFICE VISIT (OUTPATIENT)
Dept: PHYSICAL THERAPY | Facility: REHABILITATION | Age: 68
End: 2023-09-07
Payer: MEDICARE

## 2023-09-07 DIAGNOSIS — Z47.1 AFTERCARE FOLLOWING LEFT HIP JOINT REPLACEMENT SURGERY: ICD-10-CM

## 2023-09-07 DIAGNOSIS — M25.552 PAIN OF LEFT HIP: Primary | ICD-10-CM

## 2023-09-07 DIAGNOSIS — Z96.642 AFTERCARE FOLLOWING LEFT HIP JOINT REPLACEMENT SURGERY: ICD-10-CM

## 2023-09-07 PROCEDURE — 97140 MANUAL THERAPY 1/> REGIONS: CPT

## 2023-09-07 PROCEDURE — 97110 THERAPEUTIC EXERCISES: CPT

## 2023-09-07 NOTE — PROGRESS NOTES
Daily Note     Today's date: 2023  Patient name: Lakeshia Altamirano  : 1955  MRN: [id number]  Referring provider: Ashley Ramirez PA-C  Dx:   Encounter Diagnosis     ICD-10-CM    1. Pain of left hip  M25.552       2. Aftercare following left hip joint replacement surgery  Z47.1     E06.058                      Subjective: pt arrived 20 min late for scheduled appt, but able to accommodate. He noted hip pain upon waking in the a.m. but reduces as the day progresses. Objective: See treatment diary below      Assessment: Tolerated treatment well and without complaints. Modified session 2* tardiness; resume all exercises NV. Patient demonstrated fatigue post treatment, exhibited good technique with therapeutic exercises and would benefit from continued PT      Plan: Continue per plan of care. Progress treatment as tolerated.        Precautions: HTN  **No HIP PRECAUTIONS**    Daily Treatment Diary    Date        FOTO nv            Re-Eval Pre-op HELENE              Manuals    PROM hip  HELENE HELENE HELENE JG TE                                              Neuro Re-Ed                                                                                                Ther Ex                 Mini squats   5"x10 5" 2x10 5" 2x10  nv       Stand hip abd, ext   5" 2x10 B 5" 2x10 ea B 5" 2x10 ea nv       HR/TR   2x10 ea 2x10 ea 2x10 ea nv       Standing gastroc stretch   30"x3 HEP                      Glute sets  10"x10 10"x10 HEP         Quad sets   10"x10 10"x10 HEP         Heel slides - hip/knee flex  10"x10 c strap 10"x10 c strap 5" 2x10 5" 2x10 5"x10       Hip abd heel slides   10"x10 nv x10  x10       Bridges    5" 2x10 5" 2x10 5"2x10       SAQ    4# 5" 2x10 4# 5" 2x10 4# 5" 2x10                    Ther Activity    Bike    5' 8' 8' 5'                    Gait Training                              Modalities    CP PRN   10' 10' 10' 10'

## 2023-09-11 ENCOUNTER — OFFICE VISIT (OUTPATIENT)
Dept: PHYSICAL THERAPY | Facility: REHABILITATION | Age: 68
End: 2023-09-11
Payer: MEDICARE

## 2023-09-11 DIAGNOSIS — Z96.642 AFTERCARE FOLLOWING LEFT HIP JOINT REPLACEMENT SURGERY: ICD-10-CM

## 2023-09-11 DIAGNOSIS — Z47.1 AFTERCARE FOLLOWING LEFT HIP JOINT REPLACEMENT SURGERY: ICD-10-CM

## 2023-09-11 DIAGNOSIS — M25.552 PAIN OF LEFT HIP: Primary | ICD-10-CM

## 2023-09-11 PROCEDURE — 97110 THERAPEUTIC EXERCISES: CPT | Performed by: PHYSICAL THERAPIST

## 2023-09-11 PROCEDURE — 97140 MANUAL THERAPY 1/> REGIONS: CPT | Performed by: PHYSICAL THERAPIST

## 2023-09-11 NOTE — PROGRESS NOTES
Daily Note     Today's date: 2023  Patient name: Curtis Sam  : 1955  MRN: [id number]  Referring provider: Ivonne Pina PA-C  Dx:   Encounter Diagnosis     ICD-10-CM    1. Pain of left hip  M25.552       2. Aftercare following left hip joint replacement surgery  Z47.1     Z96.642                      Subjective: Pt comes to therapy without an assistive device, noting he has been trying to challenge himself. 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 Objective: See treatment diary below      Assessment: Tolerated treatment well. Unable to perform full tandem balance, however, better in part-tandem, with 1 hand assist. Continued challenge with extending left LE straight, therefore, modified heel slides to work on AA hip extension/leg flat on table. Soreness noted throughout manuals in all planes. Applied ice to patient's hip at end of session, with left LE in relatively straight position, with 1 pillow under his knee. Patient demonstrated fatigue post treatment, exhibited good technique with therapeutic exercises and would benefit from continued PT      Plan: Progress treatment as tolerated.        Precautions: HTN  **No HIP PRECAUTIONS**    Daily Treatment Diary    Date       FOTO nv            Re-Eval Pre-op HELENE              Manuals    PROM hip  HELENE HELENE HELENE JG TE HELENE                                             Neuro Re-Ed     Tandem balance                                                                                           Ther Ex                 Mini squats   5"x10 5" 2x10 5" 2x10  nv 3x10      Stand hip abd, ext   5" 2x10 B 5" 2x10 ea B 5" 2x10 ea nv 2# 3x10 ea B      HR/TR   2x10 ea 2x10 ea 2x10 ea nv       Standing gastroc stretch   30"x3 HEP         Leg press       nv      Leg curls       nv                   Glute sets  10"x10 10"x10 HEP         Quad sets   10"x10 10"x10 HEP         Heel slides - hip/knee flex  10"x10 c strap 10"x10 c strap 5" 2x10 5" 2x10 5"x10 For hip flexor stretch 10"x10      Hip abd heel slides   10"x10 nv x10  x10 np      Bridges    5" 2x10 5" 2x10 5" 2x10 5" 2x10      SAQ    4# 5" 2x10 4# 5" 2x10 4# 5" 2x10 LAQ 2# 5" 3x10                   Ther Activity    Bike    5' 8' 8' 5' 5'                   Gait Training                              Modalities    CP PRN   10' 10' 10' 10' 10' supine, 1 pillow under knee

## 2023-09-14 ENCOUNTER — OFFICE VISIT (OUTPATIENT)
Dept: PHYSICAL THERAPY | Facility: REHABILITATION | Age: 68
End: 2023-09-14
Payer: MEDICARE

## 2023-09-14 DIAGNOSIS — M25.552 PAIN OF LEFT HIP: Primary | ICD-10-CM

## 2023-09-14 DIAGNOSIS — Z47.1 AFTERCARE FOLLOWING LEFT HIP JOINT REPLACEMENT SURGERY: ICD-10-CM

## 2023-09-14 DIAGNOSIS — Z96.642 AFTERCARE FOLLOWING LEFT HIP JOINT REPLACEMENT SURGERY: ICD-10-CM

## 2023-09-14 PROCEDURE — 97110 THERAPEUTIC EXERCISES: CPT | Performed by: PHYSICAL THERAPIST

## 2023-09-14 NOTE — PROGRESS NOTES
Daily Note     Today's date: 2023  Patient name: Zaheer Israel  : 1955  MRN: [id number]  Referring provider: Gretel Carlin PA-C  Dx:   Encounter Diagnosis     ICD-10-CM    1. Pain of left hip  M25.552       2. Aftercare following left hip joint replacement surgery  Z47.1     E24.870                      Subjective: Pt arrives to therapy 15 minutes late, however, still accommodated. Reports continued stiffness in the hip. Objective: See treatment diary below      Assessment: Tolerated treatment well. Encouraged patient to continue to work on hip extension and laying LE straight, as patient is a  and spends considerable time in flexed hip position. Patient demonstrated fatigue post treatment, exhibited good technique with therapeutic exercises and would benefit from continued PT      Plan: Progress treatment as tolerated.        Precautions: HTN  **No HIP PRECAUTIONS**    Daily Treatment Diary    Date      FOTO nv            Re-Eval Pre-op HELENE              Manuals    PROM hip  [address] TE HELENE HELENE                                            Neuro Re-Ed     Tandem balance        30"x2 floor                                            Ther Ex                 Mini squats   5"x10 5" 2x10 5" 2x10  nv 3x10 3x10     Stand hip abd, ext   5" 2x10 B 5" 2x10 ea B 5" 2x10 ea nv 2# 3x10 ea B np     HR/TR   2x10 ea 2x10 ea 2x10 ea nv       Standing gastroc stretch   30"x3 HEP         Leg press       nv 88# 2x10     Leg curls       nv 44# 3x10                  Glute sets  10"x10 10"x10 HEP         Quad sets   10"x10 10"x10 HEP         Heel slides - hip/knee flex  10"x10 c strap 10"x10 c strap 5" 2x10 5" 2x10 5"x10 For hip flexor stretch 10"x10 Ext for hip flexr stretch 10"x10     Hip abd heel slides   10"x10 nv x10  x10 np      Bridges    5" 2x10 5" 2x10 5" 2x10 5" 2x10 np     SAQ    4# 5" 2x10 4# 5" 2x10 4# 5" 2x10 LAQ 2# 5" 3x10 np                  Ther Activity    Bike 5' 8' 8' 5' 5' 5'                  Gait Training                              Modalities    CP PRN   10' 10' 10' 10' 10' supine, 1 pillow under knee

## 2023-09-21 ENCOUNTER — OFFICE VISIT (OUTPATIENT)
Dept: PHYSICAL THERAPY | Facility: REHABILITATION | Age: 68
End: 2023-09-21
Payer: MEDICARE

## 2023-09-21 DIAGNOSIS — Z47.1 AFTERCARE FOLLOWING LEFT HIP JOINT REPLACEMENT SURGERY: ICD-10-CM

## 2023-09-21 DIAGNOSIS — M25.552 PAIN OF LEFT HIP: Primary | ICD-10-CM

## 2023-09-21 DIAGNOSIS — Z96.642 AFTERCARE FOLLOWING LEFT HIP JOINT REPLACEMENT SURGERY: ICD-10-CM

## 2023-09-21 PROCEDURE — 97110 THERAPEUTIC EXERCISES: CPT | Performed by: PHYSICAL THERAPIST

## 2023-09-21 PROCEDURE — 97140 MANUAL THERAPY 1/> REGIONS: CPT | Performed by: PHYSICAL THERAPIST

## 2023-09-21 NOTE — PROGRESS NOTES
Daily Note     Today's date: 2023  Patient name: Abigail Moore  : 1955  MRN: [id number]  Referring provider: Dorene La PA-C  Dx:   Encounter Diagnosis     ICD-10-CM    1. Pain of left hip  M25.552       2. Aftercare following left hip joint replacement surgery  Z47.1     I57.188                      Subjective: Pt comes to therapy denying pain or discomfort. Denies discomfort following last treatment session. States he returned to his full time job with no reported issues or limitations. States he has not had any pain lately in his hip. Follows up with surgeon early October. Objective: See treatment diary below      Assessment: Tolerated treatment well. Trial of hip flexor EOT stretch today with MHP. Patient demonstrated fatigue post treatment, exhibited good technique with therapeutic exercises and would benefit from continued PT      Plan: Progress treatment as tolerated.        Precautions: HTN  **No HIP PRECAUTIONS**    Daily Treatment Diary    Date     FOTO nv            Re-Eval Pre-op HELENE              Manuals    PROM hip  [address] TE HELENE HELENE HELENE                                           Neuro Re-Ed     Tandem balance        30"x2 floor 30"x2 B floor                                            Ther Ex                 Mini squats   5"x10 5" 2x10 5" 2x10  nv 3x10 3x10 np    Stand hip abd, ext   5" 2x10 B 5" 2x10 ea B 5" 2x10 ea nv 2# 3x10 ea B np     HR/TR   2x10 ea 2x10 ea 2x10 ea nv       Standing gastroc stretch   30"x3 HEP         Leg press       nv 88# 2x10 88# 3x10    Leg curls       nv 44# 3x10 44# 3x10                 Glute sets  10"x10 10"x10 HEP         Quad sets   10"x10 10"x10 HEP         Heel slides - hip/knee flex  10"x10 c strap 10"x10 c strap 5" 2x10 5" 2x10 5"x10 For hip flexor stretch 10"x10 Ext for hip flexr stretch 10"x10 EOT c MHP x5'    SLR - flex         AA 3"x10    S/L hip abd         3"x10    Hip abd heel slides   10"x10 nv x10  x10 np      Bridges    5" 2x10 5" 2x10 5" 2x10 5" 2x10 np 5" 2x10    SAQ    4# 5" 2x10 4# 5" 2x10 4# 5" 2x10 LAQ 2# 5" 3x10 np                  Ther Activity    Bike    5' 8' 8' 5' 5' 5' 5'    Sit to stand         x10                 Gait Training                              Modalities    CP PRN   10' 10' 10' 10' 10' supine, 1 pillow under knee

## 2023-09-25 ENCOUNTER — APPOINTMENT (OUTPATIENT)
Dept: PHYSICAL THERAPY | Facility: REHABILITATION | Age: 68
End: 2023-09-25
Payer: MEDICARE

## 2023-09-28 ENCOUNTER — OFFICE VISIT (OUTPATIENT)
Dept: PHYSICAL THERAPY | Facility: REHABILITATION | Age: 68
End: 2023-09-28
Payer: MEDICARE

## 2023-09-28 DIAGNOSIS — Z96.642 AFTERCARE FOLLOWING LEFT HIP JOINT REPLACEMENT SURGERY: ICD-10-CM

## 2023-09-28 DIAGNOSIS — M25.552 PAIN OF LEFT HIP: Primary | ICD-10-CM

## 2023-09-28 DIAGNOSIS — Z47.1 AFTERCARE FOLLOWING LEFT HIP JOINT REPLACEMENT SURGERY: ICD-10-CM

## 2023-09-28 PROCEDURE — 97140 MANUAL THERAPY 1/> REGIONS: CPT

## 2023-09-28 PROCEDURE — 97110 THERAPEUTIC EXERCISES: CPT

## 2023-09-28 NOTE — PROGRESS NOTES
Daily Note     Today's date: 2023  Patient name: Judy Hooper  : 1955  MRN: [id number]   Referring provider: Sameer tMz PA-C  Dx:   Encounter Diagnosis     ICD-10-CM    1. Pain of left hip  M25.552       2. Aftercare following left hip joint replacement surgery  Z47.1     Q67.818                      Subjective: pt offered no new complaints. Denied adverse reaction following last visit. Objective: See treatment diary below      Assessment: Tolerated treatment well and without complaints. Soreness at range of hip PROM; performed to tolerance. Good response with exercises with no adverse reactions. Patient demonstrated fatigue post treatment, exhibited good technique with therapeutic exercises and would benefit from continued PT      Plan: Continue per plan of care. Progress treatment as tolerated.        Precautions: HTN  **No HIP PRECAUTIONS**    Daily Treatment Diary    Date    FOTO nv            Re-Eval Pre-op HELENE              Manuals    PROM hip  [address] [facility] HELENE HELENE HELENE TE                                          Neuro Re-Ed     Tandem balance        30"x2 floor 30"x2 B floor  nv                                          Ther Ex                 Mini squats   5"x10 5" 2x10 5" 2x10  nv 3x10 3x10 np    Stand hip abd, ext   5" 2x10 B 5" 2x10 ea B 5" 2x10 ea nv 2# 3x10 ea B np     HR/TR   2x10 ea 2x10 ea 2x10 ea nv       Standing gastroc stretch   30"x3 HEP         Leg press       nv 88# 2x10 88# 3x10 88# 3x10   Leg curls       nv 44# 3x10 44# 3x10 44# 3x10                Glute sets  10"x10 10"x10 HEP         Quad sets   10"x10 10"x10 HEP         Heel slides - hip/knee flex  10"x10 c strap 10"x10 c strap 5" 2x10 5" 2x10 5"x10 For hip flexor stretch 10"x10 Ext for hip flexr stretch 10"x10 EOT c MHP x5'    SLR - flex         AA 3"x10 AA 3"x10   S/L hip abd         3"x10 3"x10   Hip abd heel slides   10"x10 nv x10  x10 np      Bridges    5" 2x10 5" 2x10 5" 2x10 5" 2x10 np 5" 2x10 5" 2x10   SAQ    4# 5" 2x10 4# 5" 2x10 4# 5" 2x10 LAQ 2# 5" 3x10 np                  Ther Activity    Bike    5' 8' 8' 5' 5' 5' 5' 5'   Sit to stand         x10 x10                Gait Training                              Modalities    CP PRN   10' 10' 10' 10' 10' supine, 1 pillow under knee

## 2023-10-03 ENCOUNTER — OFFICE VISIT (OUTPATIENT)
Dept: OBGYN CLINIC | Facility: MEDICAL CENTER | Age: 68
End: 2023-10-03

## 2023-10-03 ENCOUNTER — APPOINTMENT (OUTPATIENT)
Dept: RADIOLOGY | Facility: MEDICAL CENTER | Age: 68
End: 2023-10-03
Payer: MEDICARE

## 2023-10-03 VITALS
DIASTOLIC BLOOD PRESSURE: 96 MMHG | BODY MASS INDEX: 31.55 KG/M2 | HEART RATE: 79 BPM | SYSTOLIC BLOOD PRESSURE: 147 MMHG | WEIGHT: 220.4 LBS | HEIGHT: 70 IN

## 2023-10-03 DIAGNOSIS — Z96.642 STATUS POST TOTAL REPLACEMENT OF LEFT HIP: Primary | ICD-10-CM

## 2023-10-03 DIAGNOSIS — Z96.642 STATUS POST TOTAL REPLACEMENT OF LEFT HIP: ICD-10-CM

## 2023-10-03 PROCEDURE — 73502 X-RAY EXAM HIP UNI 2-3 VIEWS: CPT

## 2023-10-03 PROCEDURE — 99024 POSTOP FOLLOW-UP VISIT: CPT | Performed by: STUDENT IN AN ORGANIZED HEALTH CARE EDUCATION/TRAINING PROGRAM

## 2023-10-03 NOTE — PROGRESS NOTES
Subjective: Patient seen and examined. Pain controlled. Progressing well. He has been working in PT. His incision is well healed. He is ambulating unassisted today. He is very pleased with his progress so far. Physical Exam:  Incision: well healed surgical incision  ROM:normal post-op motion   5/5 IP/Q/HS/TA/GS, 2+ DP/PT, SILT DP/SP/S/S/TN    XR left hip: s/p press-fit total hip arthroplasty, components in good position. No fracture or dislocation. Gas has resorbed distal to the stem that was intramedullary from surgical instrumentation. Assessment/Plan:  79year old male 6 weeks s/p left total hip arthroplasty from 8/21/23.    - continue multi-modal pain control   - Weight bearing status: WBAT  - DVT ppx: complete  - Incision care:  May shower, submerge, and perform transverse friction massage  - PT/OT  - F/U 6 weeks      Scribe Attestation    I,:  Carmenza Mojica PA-C am acting as a scribe while in the presence of the attending physician.:       I,:  Prince Peacock DO personally performed the services described in this documentation    as scribed in my presence.:

## 2023-10-05 ENCOUNTER — OFFICE VISIT (OUTPATIENT)
Dept: PHYSICAL THERAPY | Facility: REHABILITATION | Age: 68
End: 2023-10-05
Payer: MEDICARE

## 2023-10-05 DIAGNOSIS — Z96.642 AFTERCARE FOLLOWING LEFT HIP JOINT REPLACEMENT SURGERY: ICD-10-CM

## 2023-10-05 DIAGNOSIS — Z47.1 AFTERCARE FOLLOWING LEFT HIP JOINT REPLACEMENT SURGERY: ICD-10-CM

## 2023-10-05 DIAGNOSIS — M25.552 PAIN OF LEFT HIP: Primary | ICD-10-CM

## 2023-10-05 PROCEDURE — 97140 MANUAL THERAPY 1/> REGIONS: CPT | Performed by: PHYSICAL THERAPIST

## 2023-10-05 PROCEDURE — 97110 THERAPEUTIC EXERCISES: CPT | Performed by: PHYSICAL THERAPIST

## 2023-10-05 NOTE — PROGRESS NOTES
Daily Note     Today's date: 10/5/2023  Patient name: Cassidy Ward  : 1955  MRN: [id number]  Referring provider: Jacques Bridges PA-C  Dx:   Encounter Diagnosis     ICD-10-CM    1. Pain of left hip  M25.552       2. Aftercare following left hip joint replacement surgery  Z47.1     Z96.642                      Subjective: Pt arrives 15 minutes late for appoint, however, still accommodated. Objective: See treatment diary below      Assessment: Tolerated treatment well. Patient demonstrated fatigue post treatment, exhibited good technique with therapeutic exercises and would benefit from continued PT      Plan: Progress treatment as tolerated.        Precautions: HTN  **No HIP PRECAUTIONS**    Daily Treatment Diary    Date 10/5   8/31 9/5 9/7 9/11 9/14 9/21 9/28   FOTO             Re-Eval                Manuals    PROM hip [facility] TE                                          Neuro Re-Ed     Tandem balance nv       30"x2 floor 30"x2 B floor  nv                                          Ther Ex                 Mini squats 3x10   5" 2x10 5" 2x10  nv 3x10 3x10 np    Stand hip abd, ext    5" 2x10 ea B 5" 2x10 ea nv 2# 3x10 ea B np     HR/TR    2x10 ea 2x10 ea nv       Leg press 110# 3x10      nv 88# 2x10 88# 3x10 88# 3x10   Leg curls 44# 3x10      nv 44# 3x10 44# 3x10 44# 3x10   Heel slides - hip/knee flex    5" 2x10 5" 2x10 5"x10 For hip flexor stretch 10"x10 Ext for hip flexr stretch 10"x10 EOT c MHP x5'    SLR - flex 3" 3x10        AA 3"x10 AA 3"x10   S/L hip abd 3" 3x10        3"x10 3"x10   Hip abd heel slides    nv x10  x10 np      Bridges 5" 2x10   5" 2x10 5" 2x10 5" 2x10 5" 2x10 np 5" 2x10 5" 2x10   SAQ    4# 5" 2x10 4# 5" 2x10 4# 5" 2x10 LAQ 2# 5" 3x10 np                  Ther Activity    Bike  5'   8' 8' 5' 5' 5' 5' 5'   Sit to stand np        x10 x10                Gait Training                              Modalities    CP PRN    10' 10' 10' 10' supine, 1 pillow under knee

## 2023-10-12 ENCOUNTER — OFFICE VISIT (OUTPATIENT)
Dept: PHYSICAL THERAPY | Facility: REHABILITATION | Age: 68
End: 2023-10-12
Payer: MEDICARE

## 2023-10-12 DIAGNOSIS — Z47.1 AFTERCARE FOLLOWING LEFT HIP JOINT REPLACEMENT SURGERY: ICD-10-CM

## 2023-10-12 DIAGNOSIS — Z96.642 AFTERCARE FOLLOWING LEFT HIP JOINT REPLACEMENT SURGERY: ICD-10-CM

## 2023-10-12 DIAGNOSIS — M25.552 PAIN OF LEFT HIP: Primary | ICD-10-CM

## 2023-10-12 PROCEDURE — 97112 NEUROMUSCULAR REEDUCATION: CPT

## 2023-10-12 PROCEDURE — 97110 THERAPEUTIC EXERCISES: CPT

## 2023-10-12 PROCEDURE — 97140 MANUAL THERAPY 1/> REGIONS: CPT

## 2023-10-12 NOTE — PROGRESS NOTES
Daily Note     Today's date: 10/12/2023  Patient name: Suzanne Rodriguez  : 1955  MRN: [id number]  Referring provider: Praveen Chew PA-C  Dx:   Encounter Diagnosis     ICD-10-CM    1. Pain of left hip  M25.552       2. Aftercare following left hip joint replacement surgery  Z47.1     O78.369                      Subjective: pt reports his L hip has been feeling pretty good. Objective: See treatment diary below      Assessment: Tolerated treatment fairly well. Less tolerance with SLR's this visit. Modified SLR flexion to hooklying marching with less discomfort in anterior hip with doing so. Good response with remainder of exercises. Patient demonstrated fatigue post treatment, exhibited good technique with therapeutic exercises, and would benefit from continued PT. Plan: Continue per plan of care. Progress treatment as tolerated.        Precautions: HTN  **No HIP PRECAUTIONS**    Daily Treatment Diary    Date 10/5 10/12  8/31 9/5 9/7 9/11 9/14 9/21 9/28   FOTO             Re-Eval                Manuals    PROM hip HELENE TE  HELENE JG TE HELENE HELENE HELENE TE                                          Neuro Re-Ed     Tandem balance nv nv      30"x2 floor 30"x2 B floor  nv                                          Ther Ex                 Mini squats 3x10 3x10  5" 2x10 5" 2x10  nv 3x10 3x10 np    Stand hip abd, ext    5" 2x10 ea B 5" 2x10 ea nv 2# 3x10 ea B np     HR/TR    2x10 ea 2x10 ea nv       Leg press 110# 3x10 110# 3x10     nv 88# 2x10 88# 3x10 88# 3x10   Leg curls 44# 3x10 44# 3x10     nv 44# 3x10 44# 3x10 44# 3x10   Heel slides - hip/knee flex    5" 2x10 5" 2x10 5"x10 For hip flexor stretch 10"x10 Ext for hip flexr stretch 10"x10 EOT c MHP x5'    SLR - flex 3" 3x10 3"x5 p!, h/l march 3"x5       AA 3"x10 AA 3"x10   S/L hip abd 3" 3x10 3"x10       3"x10 3"x10   Hip abd heel slides    nv x10  x10 np      Bridges 5" 2x10 5" 2x10  5" 2x10 5" 2x10 5" 2x10 5" 2x10 np 5" 2x10 5" 2x10   SAQ    4# 5" 2x10 4# 5" 2x10 4# 5" 2x10 LAQ 2# 5" 3x10 np                  Ther Activity    Bike  5' 5'  8' 8' 5' 5' 5' 5' 5'   Sit to stand np        x10 x10                Gait Training                              Modalities    CP PRN    10' 10' 10' 10' supine, 1 pillow under knee

## 2023-10-19 ENCOUNTER — OFFICE VISIT (OUTPATIENT)
Dept: PHYSICAL THERAPY | Facility: REHABILITATION | Age: 68
End: 2023-10-19
Payer: MEDICARE

## 2023-10-19 DIAGNOSIS — Z47.1 AFTERCARE FOLLOWING LEFT HIP JOINT REPLACEMENT SURGERY: ICD-10-CM

## 2023-10-19 DIAGNOSIS — Z96.642 AFTERCARE FOLLOWING LEFT HIP JOINT REPLACEMENT SURGERY: ICD-10-CM

## 2023-10-19 DIAGNOSIS — M25.552 PAIN OF LEFT HIP: Primary | ICD-10-CM

## 2023-10-19 PROCEDURE — 97110 THERAPEUTIC EXERCISES: CPT | Performed by: PHYSICAL THERAPIST

## 2023-10-19 PROCEDURE — 97140 MANUAL THERAPY 1/> REGIONS: CPT | Performed by: PHYSICAL THERAPIST

## 2023-10-19 NOTE — PROGRESS NOTES
Daily Note     Today's date: 10/19/2023  Patient name: Kelly King  : 1955  MRN: [id number]  Referring provider: Rk Bowen PA-C  Dx:   Encounter Diagnosis     ICD-10-CM    1. Pain of left hip  M25.552       2. Aftercare following left hip joint replacement surgery  Z47.1     Z96.642                      Subjective: Pt reports he had follow up with physician last week, denying any notable concerns and will follow up again next month. Pt comes to therapy denying pain or discomfort. Denies discomfort following last treatment session. Denies limitations performing ADLs, driving, or with transfers. Objective: See treatment diary below      Assessment: Tolerated treatment well. Good challenge with current program. Continued challenge with SLR. Continued discomfort noted with hip extension. Patient demonstrated fatigue post treatment, exhibited good technique with therapeutic exercises, and would benefit from continued PT      Plan: Potential discharge next visit. Progress treatment as tolerated.        Precautions: HTN  **No HIP PRECAUTIONS**    Daily Treatment Diary    Date 10/5 10/12 10/19  9/5 9/7 9/11 9/14 9/21 9/28   FOTO             Re-Eval                Manuals    PROM hip HELENE TE HELENE  JG TE HELENE HELENE HELENE TE                                          Neuro Re-Ed     Tandem balance nv nv 30"x2 B floor      30"x2 floor 30"x2 B floor  nv                                          Ther Ex                 Mini squats 3x10 3x10 3x10  5" 2x10  nv 3x10 3x10 np    Stand hip abd, ext     5" 2x10 ea nv 2# 3x10 ea B np     HR/TR     2x10 ea nv       Leg press 110# 3x10 110# 3x10 132# 3x10    nv 88# 2x10 88# 3x10 88# 3x10   Leg curls 44# 3x10 44# 3x10 44# 3x10    nv 44# 3x10 44# 3x10 44# 3x10   Heel slides - hip/knee flex     5" 2x10 5"x10 For hip flexor stretch 10"x10 Ext for hip flexr stretch 10"x10 EOT c MHP x5'    SLR - flex 3" 3x10 3"x5 p!, h/l march 3"x5 3" x10      AA 3"x10 AA 3"x10   S/L hip abd 3" 3x10 3"x10 3" 2x10      3"x10 3"x10   Clamshells   5" 2x10          Hip abd heel slides     x10  x10 np      Bridges 5" 2x10 5" 2x10 5" 3x10  5" 2x10 5" 2x10 5" 2x10 np 5" 2x10 5" 2x10   SAQ     4# 5" 2x10 4# 5" 2x10 LAQ 2# 5" 3x10 np                  Ther Activity    Bike  5' 5' 5'  8' 5' 5' 5' 5' 5'   Sit to stand np        x10 x10                Gait Training                              Modalities    CP PRN     10' 10' 10' supine, 1 pillow under knee

## 2023-10-20 ENCOUNTER — TELEPHONE (OUTPATIENT)
Dept: HEMATOLOGY ONCOLOGY | Facility: CLINIC | Age: 68
End: 2023-10-20

## 2023-10-23 ENCOUNTER — APPOINTMENT (OUTPATIENT)
Dept: LAB | Facility: IMAGING CENTER | Age: 68
End: 2023-10-23
Payer: MEDICARE

## 2023-10-23 DIAGNOSIS — R76.8 ELEVATED SERUM IMMUNOGLOBULIN FREE LIGHT CHAIN LEVEL: ICD-10-CM

## 2023-10-23 DIAGNOSIS — R77.8 ABNORMAL SPEP: ICD-10-CM

## 2023-10-23 LAB
BASOPHILS # BLD AUTO: 0.02 THOUSANDS/ÂΜL (ref 0–0.1)
BASOPHILS NFR BLD AUTO: 0 % (ref 0–1)
EOSINOPHIL # BLD AUTO: 0.19 THOUSAND/ÂΜL (ref 0–0.61)
EOSINOPHIL NFR BLD AUTO: 3 % (ref 0–6)
ERYTHROCYTE [DISTWIDTH] IN BLOOD BY AUTOMATED COUNT: 12.3 % (ref 11.6–15.1)
HCT VFR BLD AUTO: 48.5 % (ref 36.5–49.3)
HGB BLD-MCNC: 15.9 G/DL (ref 12–17)
IGA SERPL-MCNC: 555 MG/DL (ref 66–433)
IGG SERPL-MCNC: 1215 MG/DL (ref 635–1741)
IGM SERPL-MCNC: 47 MG/DL (ref 45–281)
IMM GRANULOCYTES # BLD AUTO: 0.01 THOUSAND/UL (ref 0–0.2)
IMM GRANULOCYTES NFR BLD AUTO: 0 % (ref 0–2)
LYMPHOCYTES # BLD AUTO: 2.36 THOUSANDS/ÂΜL (ref 0.6–4.47)
LYMPHOCYTES NFR BLD AUTO: 41 % (ref 14–44)
MCH RBC QN AUTO: 33.1 PG (ref 26.8–34.3)
MCHC RBC AUTO-ENTMCNC: 32.8 G/DL (ref 31.4–37.4)
MCV RBC AUTO: 101 FL (ref 82–98)
MONOCYTES # BLD AUTO: 0.44 THOUSAND/ÂΜL (ref 0.17–1.22)
MONOCYTES NFR BLD AUTO: 8 % (ref 4–12)
NEUTROPHILS # BLD AUTO: 2.69 THOUSANDS/ÂΜL (ref 1.85–7.62)
NEUTS SEG NFR BLD AUTO: 48 % (ref 43–75)
NRBC BLD AUTO-RTO: 0 /100 WBCS
PLATELET # BLD AUTO: 218 THOUSANDS/UL (ref 149–390)
PMV BLD AUTO: 11.4 FL (ref 8.9–12.7)
PROT SERPL-MCNC: 7.4 G/DL (ref 6.4–8.4)
RBC # BLD AUTO: 4.8 MILLION/UL (ref 3.88–5.62)
WBC # BLD AUTO: 5.71 THOUSAND/UL (ref 4.31–10.16)

## 2023-10-23 PROCEDURE — 83521 IG LIGHT CHAINS FREE EACH: CPT

## 2023-10-23 PROCEDURE — 84165 PROTEIN E-PHORESIS SERUM: CPT

## 2023-10-23 PROCEDURE — 36415 COLL VENOUS BLD VENIPUNCTURE: CPT

## 2023-10-23 PROCEDURE — 84155 ASSAY OF PROTEIN SERUM: CPT

## 2023-10-23 PROCEDURE — 85025 COMPLETE CBC W/AUTO DIFF WBC: CPT

## 2023-10-23 PROCEDURE — 82784 ASSAY IGA/IGD/IGG/IGM EACH: CPT

## 2023-10-24 ENCOUNTER — OFFICE VISIT (OUTPATIENT)
Dept: HEMATOLOGY ONCOLOGY | Facility: CLINIC | Age: 68
End: 2023-10-24
Payer: MEDICARE

## 2023-10-24 VITALS
SYSTOLIC BLOOD PRESSURE: 122 MMHG | OXYGEN SATURATION: 98 % | TEMPERATURE: 98.2 F | DIASTOLIC BLOOD PRESSURE: 80 MMHG | HEIGHT: 70 IN | HEART RATE: 68 BPM | BODY MASS INDEX: 32.21 KG/M2 | RESPIRATION RATE: 18 BRPM | WEIGHT: 225 LBS

## 2023-10-24 DIAGNOSIS — R76.8 ELEVATED SERUM IMMUNOGLOBULIN FREE LIGHT CHAIN LEVEL: Primary | ICD-10-CM

## 2023-10-24 LAB
ALBUMIN SERPL ELPH-MCNC: 3.91 G/DL (ref 3.2–5.1)
ALBUMIN SERPL ELPH-MCNC: 54.3 % (ref 48–70)
ALPHA1 GLOB SERPL ELPH-MCNC: 0.24 G/DL (ref 0.15–0.47)
ALPHA1 GLOB SERPL ELPH-MCNC: 3.4 % (ref 1.8–7)
ALPHA2 GLOB SERPL ELPH-MCNC: 0.81 G/DL (ref 0.42–1.04)
ALPHA2 GLOB SERPL ELPH-MCNC: 11.3 % (ref 5.9–14.9)
BETA GLOB ABNORMAL SERPL ELPH-MCNC: 0.49 G/DL (ref 0.31–0.57)
BETA1 GLOB SERPL ELPH-MCNC: 6.8 % (ref 4.7–7.7)
BETA2 GLOB SERPL ELPH-MCNC: 8.5 % (ref 3.1–7.9)
BETA2+GAMMA GLOB SERPL ELPH-MCNC: 0.61 G/DL (ref 0.2–0.58)
GAMMA GLOB ABNORMAL SERPL ELPH-MCNC: 1.13 G/DL (ref 0.4–1.66)
GAMMA GLOB SERPL ELPH-MCNC: 15.7 % (ref 6.9–22.3)
IGG/ALB SER: 1.19 {RATIO} (ref 1.1–1.8)
KAPPA LC FREE SER-MCNC: 39.9 MG/L (ref 3.3–19.4)
KAPPA LC FREE/LAMBDA FREE SER: 2.07 {RATIO} (ref 0.26–1.65)
LAMBDA LC FREE SERPL-MCNC: 19.3 MG/L (ref 5.7–26.3)
PROT PATTERN SERPL ELPH-IMP: ABNORMAL
PROT SERPL-MCNC: 7.2 G/DL (ref 6.4–8.4)

## 2023-10-24 PROCEDURE — 99214 OFFICE O/P EST MOD 30 MIN: CPT | Performed by: PHYSICIAN ASSISTANT

## 2023-10-24 PROCEDURE — 84165 PROTEIN E-PHORESIS SERUM: CPT | Performed by: PATHOLOGY

## 2023-10-24 NOTE — PROGRESS NOTES
Hematology/Oncology Outpatient Follow-up  Luciana Braga 79 y.o. male 1955 [id number]    Date:  10/24/2023      Assessment and Plan:  1. Elevated serum immunoglobulin free light chain level  79year old male presents for follow up for elevated light chain. He was last seen in follow up in Oct 2022.     08/14/2021 labs   SPEP on  showed no monoclonal gammopathy. IgA is elevated at 554. IgG 1167. IgM 46. Kappa free light chain mildly elevated at 27.8, lambda light chain 12.9, kappa/lambda ratio 2. 16. Beta 2 microglobulin 2.32. Bone marrow showed polyclonal plasma cells 8%, therefore not consistent with plasma dyscrasia, possibly related to his   liver disease. Skeletal survey negative but did show severe left hip osteoarthritis. He had labs prior to today visit but SPEP and light chains are still in process. IgG/IgM normal levels; IgA 555. Will call when SPEP and light chains are resulted and determine if follow up is needed. HPI:  59-year-old male presents for consultation regarding abnormal SPEP. This was completed in April 2021. This showed a restricted M spike in the beta 1 globin region. Immunofixation unfortunately was not done as a reflex test therefore the type in size of M spike is unknown. SPEP was ordered by GI for workup of elevated LFTs. SPEP on 08/14/2021 showed no monoclonal gammopathy. CMP showed a normal creatinine, calcium,  Alk-phos, AST was 33. ALT still elevated at 74. CBC showed a normal hemoglobin of 16.2, .8, platelet count 759, WBC 6.4 with a normal differential.     IgA is elevated at 554. IgG 1167. IgM 46. Kappa free light chain mildly elevated at 27.8, lambda light chain 12.9, kappa/lambda ratio 2. 16. Beta 2 microglobulin 2.32. Patient seen in follow up on 9/1/21. Reviewed with patient that this could be consistent with light chain MGUS.   To meet this criteria patient must have an abnormal free light chain ratio > 1.65  as well as the increased kappa free light chain, for which he fits this criteria. No monoclonal immunoglobulin heavy chain; fits this criteria. Patient is also without hypercalcemia, renal insufficiency, and anemia. Reviewed that we need to know the percentage of clonal lymphoplasmacytic cells in the bone marrow. If these are  fever than 10% then he would fit the criteria of light chain MGUS. Additionally absence of lytic bone lesions would fit criteria; therefore he also needs a skeletal survey. Interval history: had hip replacement, feeling a lot better in regards to pain associated with that     ROS: Review of Systems   Constitutional:  Negative for activity change, appetite change, chills, fatigue, fever and unexpected weight change. Respiratory:  Negative for cough and shortness of breath. Cardiovascular:  Negative for chest pain, palpitations and leg swelling. Gastrointestinal:  Negative for abdominal pain, constipation, diarrhea, nausea and vomiting. Genitourinary:  Negative for difficulty urinating, dysuria and hematuria. Musculoskeletal:  Negative for arthralgias. Skin: Negative. Neurological:  Negative for dizziness, weakness, light-headedness, numbness and headaches. Hematological: Negative. Psychiatric/Behavioral: Negative.        Past Medical History:   Diagnosis Date    Abdominal pain     Acute infection of external ear     BPH with obstruction/lower urinary tract symptoms     BPH without urinary obstruction 11/2013    Chills (without fever)     Chronic sinusitis     Diabetes mellitus (HCC)     Disease of thyroid gland     Enlarged prostate     Fatty liver     Headache     Hematuria     Hypertension     Joint pain     Kidney stone     Legally blind in right eye, as defined in [place]     shadows only    Nausea and vomiting     Retention of urine     Streptococcal sore throat     Ureteral calculi     Wheezing        Past Surgical History:   Procedure Laterality Date    COLONOSCOPY  02/2010    by -colon polyps    COLONOSCOPY  12/28/2017    Anupam Wells M.D. / multiple polyps removed, 1.5 cm polyp ascending colon-tubulovillous adenoma, 20 mm polyp descending colon and 2 other smaller polyps all tubular adenomas    COLONOSCOPY  02/20/2019    Harley Vaughan M.D. / 3 polyps-tubular adenomas, diverticulosis    COLONOSCOPY  11/10/2022    8 polyps tubular adenomas, biopsy of ileocecal valve [facility]  [facility]    [facility]    IR BIOPSY BONE MARROW  09/23/2021    KNEE ARTHROSCOPY Bilateral     AK ARTHRP ACETBLR/PROX FEM PROSTC AGRFT/ALGRFT Left 8/21/2023    Procedure: ARTHROPLASTY HIP TOTAL;  Surgeon: Linda Martin DO;  Location: [facility];  Service: Orthopedics       Social History     Socioeconomic History    Marital status: /Civil Union     Spouse name: None    Number of children: None    Years of education: None    Highest education level: None   Occupational History    Occupation:    Tobacco Use    Smoking status: Never     Passive exposure: Never    Smokeless tobacco: Never   Vaping Use    Vaping Use: Never used   Substance and Sexual Activity    Alcohol use:  Yes     Alcohol/week: 8.0 standard drinks of alcohol     Types: 2 Cans of beer, 6 Standard drinks or equivalent per week     Comment: last drink Sat night 8/19    Drug use: Never    Sexual activity: None     Comment: defer   Other Topics Concern    None   Social History Narrative    None     Social Determinants of Health     Financial Resource Strain: Not on file   Food Insecurity: Not on file   Transportation Needs: Not on file   Physical Activity: Not on file   Stress: Not on file   Social Connections: Not on file   Intimate Partner Violence: Not on file   Housing Stability: Not on file       Family History   Problem Relation Age of Onset    Breast cancer Mother     Cancer Father        Allergies   Allergen Reactions Lisinopril Cough         Current Outpatient Medications:     acetaminophen (TYLENOL) 500 mg tablet, Take 2 tablets (1,000 mg total) by mouth every 8 (eight) hours, Disp: 60 tablet, Rfl: 0    amLODIPine (NORVASC) 10 mg tablet, Take 10 mg by mouth daily, Disp: , Rfl:     ascorbic acid (VITAMIN C) 500 MG tablet, Take 1 tablet (500 mg total) by mouth 2 (two) times a day, Disp: 30 tablet, Rfl: 3    aspirin (ECOTRIN LOW STRENGTH) 81 mg EC tablet, Take 1 tablet (81 mg total) by mouth 2 (two) times a day, Disp: 60 tablet, Rfl: 0    celecoxib (CeleBREX) 200 mg capsule, Take 200 mg by mouth as needed, Disp: , Rfl:     celecoxib (CeleBREX) 200 mg capsule, Take 1 capsule (200 mg total) by mouth 2 (two) times a day, Disp: 60 capsule, Rfl: 0    cholecalciferol (VITAMIN D3) 1,000 units tablet, Take 2 tablets (2,000 Units total) by mouth daily, Disp: 60 tablet, Rfl: 1    cloNIDine (CATAPRES) 0.1 mg tablet, Take 0.1 mg by mouth every 12 (twelve) hours, Disp: , Rfl:     folic acid (FOLVITE) 1 mg tablet, Take 1 tablet (1 mg total) by mouth daily, Disp: 30 tablet, Rfl: 3    hydrochlorothiazide (HYDRODIURIL) 25 mg tablet, Take 25 mg by mouth daily, Disp: , Rfl:     losartan (COZAAR) 50 mg tablet, Take 50 mg by mouth daily, Disp: , Rfl:     metFORMIN (GLUCOPHAGE-XR) 500 mg 24 hr tablet, Take 500 mg by mouth every morning, Disp: , Rfl:     Multiple Vitamins-Minerals (multivitamin with minerals) tablet, Take 1 tablet by mouth daily, Disp: 30 tablet, Rfl: 3    NP Thyroid 60 MG tablet, TAKE 1 TABLET BY MOUTH ONCE DAILY IN THE MORNING FIRST THING IN IN THE MORNING WITH A GLASS OF WATER-NOTHING TO EAT OR DRINK FOR 30 MINUTES AFTERWARDS, Disp: , Rfl:     ondansetron (ZOFRAN-ODT) 4 mg disintegrating tablet, Take 1 tablet (4 mg total) by mouth every 6 (six) hours as needed for nausea or vomiting, Disp: 20 tablet, Rfl: 0    rosuvastatin (CRESTOR) 20 MG tablet, Take 20 mg by mouth daily at bedtime, Disp: , Rfl:     senna-docusate sodium (SENOKOT S) 8.6-50 mg per tablet, Take 1 tablet by mouth daily, Disp: 30 tablet, Rfl: 0    Omega-3 Fatty Acids (FISH OIL) 1,000 mg, Take 1,000 mg by mouth daily (Patient not taking: Reported on 10/3/2023), Disp: , Rfl:       Physical Exam:  /80 (BP Location: Left arm, Patient Position: Sitting, Cuff Size: Adult)   Pulse 68   Temp 98.2 °F (36.8 °C) (Temporal)   Resp 18   Ht 5' 10" (1.778 m)   Wt 102 kg (225 lb)   SpO2 98%   BMI 32.28 kg/m²     Physical Exam  Vitals reviewed. Constitutional:       General: He is not in acute distress. Appearance: He is well-developed. He is not ill-appearing. HENT:      Head: Normocephalic and atraumatic. Eyes:      General: No scleral icterus. Conjunctiva/sclera: Conjunctivae normal.   Cardiovascular:      Rate and Rhythm: Normal rate and regular rhythm. Heart sounds: Normal heart sounds. No murmur heard. Pulmonary:      Effort: Pulmonary effort is normal. No respiratory distress. Breath sounds: Normal breath sounds. Abdominal:      Palpations: Abdomen is soft. Tenderness: There is no abdominal tenderness. Musculoskeletal:         General: No tenderness. Normal range of motion. Cervical back: Normal range of motion and neck supple. Right lower leg: No edema. Left lower leg: No edema. Lymphadenopathy:      Cervical: No cervical adenopathy. Skin:     General: Skin is warm and dry. Neurological:      Mental Status: He is alert and oriented to person, place, and time. Cranial Nerves: No cranial nerve deficit.    Psychiatric:         Mood and Affect: Mood normal.         Behavior: Behavior normal.       Labs:  Lab Results   Component Value Date    WBC 5.71 10/23/2023    HGB 15.9 10/23/2023    HCT 48.5 10/23/2023     (H) 10/23/2023     10/23/2023     I have spent 20 minutes with Patient  today in which greater than 50% of this time was spent in counseling/coordination of care regarding Impressions, Documenting in the medical record, Reviewing / ordering tests, medicine, procedures  , and Obtaining or reviewing history  . Patient voiced understanding and agreement in the above discussion. Aware to contact our office with questions/symptoms in the interim. This note has been generated by voice recognition software system. Therefore, there may be spelling, grammar, and or syntax errors. Please contact if questions arise.

## 2023-10-25 ENCOUNTER — TELEPHONE (OUTPATIENT)
Dept: HEMATOLOGY ONCOLOGY | Facility: CLINIC | Age: 68
End: 2023-10-25

## 2023-10-25 NOTE — TELEPHONE ENCOUNTER
Attempted to contact patient about labs, patient does not have a voicemail box that has been set up yet, will send my chart message. Cartilage Graft Text: The defect edges were debeveled with a #15 scalpel blade.  Given the location of the defect, shape of the defect, the fact the defect involved a full thickness cartilage defect a cartilage graft was deemed most appropriate.  An appropriate donor site was identified, cleansed, and anesthetized. The cartilage graft was then harvested and transferred to the recipient site, oriented appropriately and then sutured into place.  The secondary defect was then repaired using a primary closure.

## 2023-10-26 ENCOUNTER — OFFICE VISIT (OUTPATIENT)
Dept: PHYSICAL THERAPY | Facility: REHABILITATION | Age: 68
End: 2023-10-26
Payer: MEDICARE

## 2023-10-26 DIAGNOSIS — Z47.1 AFTERCARE FOLLOWING LEFT HIP JOINT REPLACEMENT SURGERY: ICD-10-CM

## 2023-10-26 DIAGNOSIS — M25.552 PAIN OF LEFT HIP: Primary | ICD-10-CM

## 2023-10-26 DIAGNOSIS — Z96.642 AFTERCARE FOLLOWING LEFT HIP JOINT REPLACEMENT SURGERY: ICD-10-CM

## 2023-10-26 PROCEDURE — 97140 MANUAL THERAPY 1/> REGIONS: CPT

## 2023-10-26 PROCEDURE — 97110 THERAPEUTIC EXERCISES: CPT

## 2023-10-26 PROCEDURE — 97530 THERAPEUTIC ACTIVITIES: CPT

## 2023-10-26 NOTE — PROGRESS NOTES
Daily Note     Today's date: 10/26/2023  Patient name: Michael Rachel  : 1955  MRN: [id number]  Referring provider: Maryam Choi PA-C  Dx:   Encounter Diagnosis     ICD-10-CM    1. Pain of left hip  M25.552       2. Aftercare following left hip joint replacement surgery  Z47.1     D24.865                      Subjective: pt reports feeling pretty good pre-tx. He denied pain and noted pain primarily with stair climbing. Objective: See treatment diary below      Assessment: Tolerated treatment well and without complaints. Good response with exercises. Continues with mild pain into passive hip flexion, but denied discomfort with remainder of PROM. Patient demonstrated fatigue post treatment, exhibited good technique with therapeutic exercises, and would benefit from continued PT      Plan: Continue per plan of care. Progress treatment as tolerated.        Precautions: HTN  **No HIP PRECAUTIONS**    Daily Treatment Diary    Date 10/5 10/12 10/19 10/26  9/7 9/11 9/14 9/21 9/28   FOTO             Re-Eval                Manuals    PROM hip HELENE TE HELENE TE  TE HELENE HELENE HELENE TE                                          Neuro Re-Ed     Tandem balance nv nv 30"x2 B floor  30"x2 B floor     30"x2 floor 30"x2 B floor  nv                                          Ther Ex                 Mini squats 3x10 3x10 3x10 3x10  nv 3x10 3x10 np    Stand hip abd, ext      nv 2# 3x10 ea B np     HR/TR      nv       Leg press 110# 3x10 110# 3x10 132# 3x10 132# 3x10   nv 88# 2x10 88# 3x10 88# 3x10   Leg curls 44# 3x10 44# 3x10 44# 3x10 44# 3x10   nv 44# 3x10 44# 3x10 44# 3x10   Heel slides - hip/knee flex      5"x10 For hip flexor stretch 10"x10 Ext for hip flexr stretch 10"x10 EOT c MHP x5'    SLR - flex 3" 3x10 3"x5 p!, h/l march 3"x5 3" x10 3"x10     AA 3"x10 AA 3"x10   S/L hip abd 3" 3x10 3"x10 3" 2x10 3"2x10     3"x10 3"x10   Clamshells   5" 2x10 5"2x10         Hip abd heel slides      x10 np      Bridges 5" 2x10 5" 2x10 5" 3x10 5" 3x10  5" 2x10 5" 2x10 np 5" 2x10 5" 2x10   SAQ      4# 5" 2x10 LAQ 2# 5" 3x10 np                  Ther Activity    Bike  5' 5' 5' 5'  5' 5' 5' 5' 5'   Sit to stand np        x10 x10                Gait Training                              Modalities    CP PRN      10' 10' supine, 1 pillow under knee

## 2023-11-02 ENCOUNTER — OFFICE VISIT (OUTPATIENT)
Dept: PHYSICAL THERAPY | Facility: REHABILITATION | Age: 68
End: 2023-11-02
Payer: MEDICARE

## 2023-11-02 DIAGNOSIS — Z96.642 AFTERCARE FOLLOWING LEFT HIP JOINT REPLACEMENT SURGERY: ICD-10-CM

## 2023-11-02 DIAGNOSIS — Z47.1 AFTERCARE FOLLOWING LEFT HIP JOINT REPLACEMENT SURGERY: ICD-10-CM

## 2023-11-02 DIAGNOSIS — M25.552 PAIN OF LEFT HIP: Primary | ICD-10-CM

## 2023-11-02 PROCEDURE — 97110 THERAPEUTIC EXERCISES: CPT | Performed by: PHYSICAL THERAPIST

## 2023-11-02 PROCEDURE — 97140 MANUAL THERAPY 1/> REGIONS: CPT | Performed by: PHYSICAL THERAPIST

## 2023-11-02 NOTE — PROGRESS NOTES
Daily Note     Today's date: 2023  Patient name: Zaheer Israel  : 1955  MRN: [id number]  Referring provider: Gretel Carlin PA-C  Dx:   Encounter Diagnosis     ICD-10-CM    1. Pain of left hip  M25.552       2. Aftercare following left hip joint replacement surgery  Z47.1     O45.388                      Subjective: Pt comes to therapy denying pain or discomfort. Denies discomfort following last treatment session. States he no longer feels limitations due to hip. Reports he has achieved his goals for therapy at present time. Objective: See treatment diary below      Assessment: Tolerated treatment well. Patient exhibited good technique with therapeutic exercises      Plan: Discharge to [facility] due to meeting goals. Issued patient updated HEP and given blue TB. Advised patient to contact clinic should he have any questions or concerns.       Precautions: HTN  **No HIP PRECAUTIONS**    Daily Treatment Diary    Date 10/5 10/12 10/19 10/26 11/2   9/14 9/21 9/28   FOTO             Re-Eval                Manuals    PROM hip HELENE TE HELENE TE    HELENE HELENE TE                                          Neuro Re-Ed     Tandem balance nv nv 30"x2 B floor  30"x2 B floor  30"x2 B floor    30"x2 floor 30"x2 B floor  nv                                          Ther Ex                 Mini squats 3x10 3x10 3x10 3x10 3x10   3x10 np    Stand hip abd, ext        np     HR/TR             Leg press 110# 3x10 110# 3x10 132# 3x10 132# 3x10 132# 3x10   88# 2x10 88# 3x10 88# 3x10   Leg curls 44# 3x10 44# 3x10 44# 3x10 44# 3x10 44# 3x10   44# 3x10 44# 3x10 44# 3x10   Heel slides - hip/knee flex        Ext for hip flexr stretch 10"x10 EOT c MHP x5'    SLR - flex 3" 3x10 3"x5 p!, h/l march 3"x5 3" x10 3"x10     AA 3"x10 AA 3"x10   S/L hip abd 3" 3x10 3"x10 3" 2x10 3" 2x10 3" 2x10    3"x10 3"x10   Clamshells   5" 2x10 5" 2x10 Blue 5" 2x10        Hip abd heel slides             Bridges 5" 2x10 5" 2x10 5" 3x10 5" 3x10 Blue 5" 3x10   np 5" 2x10 5" 2x10   SAQ        np                  Ther Activity    Bike  5' 5' 5' 5' 5'   5' 5' 5'   Sit to stand np        x10 x10                Gait Training                              Modalities    CP PRN                            Access Code: [email or web address]  URL: [email or web address]  Date: 11/02/2023  Prepared by: Vincent Rankin    Exercises  - Supine Bridge with Resistance Band  - 3 x weekly - 3 sets - 10 reps - 5 hold  - Clamshell with Resistance  - 3 x weekly - 3 sets - 10 reps - 5 hold  - Active Straight Leg Raise with Quad Set  - 3 x weekly - 3 sets - 10 reps - 5 hold  - Sidelying Hip Abduction (Mirrored)  - 3 x weekly - 3 sets - 10 reps - 5 hold  - Side Stepping with Resistance at Ankles  - 3 x weekly - 10 sets - 3 reps  - Mini Squat with Counter Support  - 3 x weekly - 3 sets - 10 reps - 5 hold

## 2023-11-09 ENCOUNTER — APPOINTMENT (OUTPATIENT)
Dept: PHYSICAL THERAPY | Facility: REHABILITATION | Age: 68
End: 2023-11-09
Payer: MEDICARE

## 2023-11-14 ENCOUNTER — OFFICE VISIT (OUTPATIENT)
Dept: OBGYN CLINIC | Facility: MEDICAL CENTER | Age: 68
End: 2023-11-14

## 2023-11-14 VITALS
SYSTOLIC BLOOD PRESSURE: 149 MMHG | DIASTOLIC BLOOD PRESSURE: 102 MMHG | HEART RATE: 71 BPM | WEIGHT: 228.8 LBS | HEIGHT: 70 IN | BODY MASS INDEX: 32.75 KG/M2

## 2023-11-14 DIAGNOSIS — Z96.642 STATUS POST TOTAL REPLACEMENT OF LEFT HIP: Primary | ICD-10-CM

## 2023-11-14 PROCEDURE — 99024 POSTOP FOLLOW-UP VISIT: CPT | Performed by: STUDENT IN AN ORGANIZED HEALTH CARE EDUCATION/TRAINING PROGRAM

## 2023-11-14 NOTE — PROGRESS NOTES
Subjective:68 y.o. male presents to the office 3 months s/p left total hip arthroplasty performed on 08/21/2023. He reports no pain Progressing well and has completed physical therapy. He denies any limitations at this time. Incision without drainage. He is very happy with his result. Physical Exam:  Incision: Well healed  ROM: full without pain  5/5 IP/Q/HS/TA/GS, 2+ DP/PT, SILT DP/SP/S/S/TN    No new imaging obtained today    Assessment/Plan:  3 months s/p left total hip arthroplasty performed on 08/21/2023 doing excellent     - continue multi-modal pain control   - Weight bearing status: as tolerated  - DVT ppx: complete  - Continue PT/OT exercises  - F/U in 9 months for annual check.  New x-rays of the left hip upon arrival.     Scribe Attestation      I,:  Inocencio Diaz am acting as a scribe while in the presence of the attending physician.:       I,:  Mesha Kidd DO personally performed the services described in this documentation    as scribed in my presence.:

## 2024-02-27 ENCOUNTER — APPOINTMENT (OUTPATIENT)
Dept: LAB | Facility: IMAGING CENTER | Age: 69
End: 2024-02-27
Payer: COMMERCIAL

## 2024-02-27 DIAGNOSIS — E55.9 VITAMIN D DEFICIENCY DISEASE: ICD-10-CM

## 2024-02-27 DIAGNOSIS — E03.9 HYPOTHYROIDISM, UNSPECIFIED TYPE: ICD-10-CM

## 2024-02-27 DIAGNOSIS — Z79.899 ENCOUNTER FOR LONG-TERM (CURRENT) USE OF OTHER MEDICATIONS: ICD-10-CM

## 2024-02-27 DIAGNOSIS — R94.5 ABNORMAL RESULTS OF LIVER FUNCTION STUDIES: ICD-10-CM

## 2024-02-27 DIAGNOSIS — R31.9 HEMATURIA, UNSPECIFIED TYPE: ICD-10-CM

## 2024-02-27 DIAGNOSIS — E78.00 PURE HYPERCHOLESTEROLEMIA: ICD-10-CM

## 2024-02-27 DIAGNOSIS — F10.10 ALCOHOL ABUSE: ICD-10-CM

## 2024-02-27 DIAGNOSIS — I71.9 AORTIC ANEURYSM, UNSPECIFIED PORTION OF AORTA, UNSPECIFIED WHETHER RUPTURED (HCC): ICD-10-CM

## 2024-02-27 DIAGNOSIS — D47.2 MONOCLONAL GAMMOPATHY, BENIGN: ICD-10-CM

## 2024-02-27 DIAGNOSIS — E11.638 TYPE 2 DIABETES MELLITUS WITH OTHER ORAL COMPLICATION, UNSPECIFIED WHETHER LONG TERM INSULIN USE (HCC): ICD-10-CM

## 2024-02-27 DIAGNOSIS — I10 ESSENTIAL HYPERTENSION, MALIGNANT: ICD-10-CM

## 2024-02-27 DIAGNOSIS — H54.413A BLINDNESS RIGHT EYE CATEGORY 3, NORMAL VISION LEFT EYE: ICD-10-CM

## 2024-02-27 LAB
25(OH)D3 SERPL-MCNC: 38.6 NG/ML (ref 30–100)
ALBUMIN SERPL BCP-MCNC: 4.2 G/DL (ref 3.5–5)
ALP SERPL-CCNC: 47 U/L (ref 34–104)
ALT SERPL W P-5'-P-CCNC: 31 U/L (ref 7–52)
ANION GAP SERPL CALCULATED.3IONS-SCNC: 10 MMOL/L
AST SERPL W P-5'-P-CCNC: 25 U/L (ref 13–39)
BILIRUB SERPL-MCNC: 0.78 MG/DL (ref 0.2–1)
BUN SERPL-MCNC: 15 MG/DL (ref 5–25)
CALCIUM SERPL-MCNC: 9.1 MG/DL (ref 8.4–10.2)
CHLORIDE SERPL-SCNC: 104 MMOL/L (ref 96–108)
CHOLEST SERPL-MCNC: 232 MG/DL
CO2 SERPL-SCNC: 24 MMOL/L (ref 21–32)
CREAT SERPL-MCNC: 0.98 MG/DL (ref 0.6–1.3)
ERYTHROCYTE [DISTWIDTH] IN BLOOD BY AUTOMATED COUNT: 12.6 % (ref 11.6–15.1)
GFR SERPL CREATININE-BSD FRML MDRD: 80 ML/MIN/1.73SQ M
GLUCOSE P FAST SERPL-MCNC: 126 MG/DL (ref 65–99)
HCT VFR BLD AUTO: 46.6 % (ref 36.5–49.3)
HDLC SERPL-MCNC: 41 MG/DL
HGB BLD-MCNC: 15.8 G/DL (ref 12–17)
LDLC SERPL CALC-MCNC: 126 MG/DL (ref 0–100)
MCH RBC QN AUTO: 34.1 PG (ref 26.8–34.3)
MCHC RBC AUTO-ENTMCNC: 33.9 G/DL (ref 31.4–37.4)
MCV RBC AUTO: 100 FL (ref 82–98)
NONHDLC SERPL-MCNC: 191 MG/DL
PLATELET # BLD AUTO: 221 THOUSANDS/UL (ref 149–390)
PMV BLD AUTO: 11.2 FL (ref 8.9–12.7)
POTASSIUM SERPL-SCNC: 4.2 MMOL/L (ref 3.5–5.3)
PROT SERPL-MCNC: 7 G/DL (ref 6.4–8.4)
RBC # BLD AUTO: 4.64 MILLION/UL (ref 3.88–5.62)
SODIUM SERPL-SCNC: 138 MMOL/L (ref 135–147)
TRIGL SERPL-MCNC: 324 MG/DL
TSH SERPL DL<=0.05 MIU/L-ACNC: 3.8 UIU/ML (ref 0.45–4.5)
WBC # BLD AUTO: 6.34 THOUSAND/UL (ref 4.31–10.16)

## 2024-02-27 PROCEDURE — 84443 ASSAY THYROID STIM HORMONE: CPT

## 2024-02-27 PROCEDURE — 85027 COMPLETE CBC AUTOMATED: CPT

## 2024-02-27 PROCEDURE — 80053 COMPREHEN METABOLIC PANEL: CPT

## 2024-02-27 PROCEDURE — 82306 VITAMIN D 25 HYDROXY: CPT

## 2024-02-27 PROCEDURE — 36415 COLL VENOUS BLD VENIPUNCTURE: CPT

## 2024-02-27 PROCEDURE — 80061 LIPID PANEL: CPT

## 2024-05-04 ENCOUNTER — OFFICE VISIT (OUTPATIENT)
Dept: URGENT CARE | Age: 69
End: 2024-05-04
Payer: COMMERCIAL

## 2024-05-04 ENCOUNTER — APPOINTMENT (OUTPATIENT)
Dept: RADIOLOGY | Age: 69
End: 2024-05-04
Payer: COMMERCIAL

## 2024-05-04 VITALS
OXYGEN SATURATION: 96 % | HEART RATE: 78 BPM | WEIGHT: 240 LBS | SYSTOLIC BLOOD PRESSURE: 137 MMHG | TEMPERATURE: 97.4 F | RESPIRATION RATE: 18 BRPM | HEIGHT: 70 IN | DIASTOLIC BLOOD PRESSURE: 84 MMHG | BODY MASS INDEX: 34.36 KG/M2

## 2024-05-04 DIAGNOSIS — J40 BRONCHITIS: Primary | ICD-10-CM

## 2024-05-04 DIAGNOSIS — R05.3 CHRONIC COUGH: ICD-10-CM

## 2024-05-04 PROCEDURE — 99213 OFFICE O/P EST LOW 20 MIN: CPT | Performed by: EMERGENCY MEDICINE

## 2024-05-04 PROCEDURE — G0463 HOSPITAL OUTPT CLINIC VISIT: HCPCS | Performed by: EMERGENCY MEDICINE

## 2024-05-04 PROCEDURE — 71046 X-RAY EXAM CHEST 2 VIEWS: CPT

## 2024-05-04 RX ORDER — AZITHROMYCIN 250 MG/1
TABLET, FILM COATED ORAL
Qty: 6 TABLET | Refills: 0 | Status: SHIPPED | OUTPATIENT
Start: 2024-05-04 | End: 2024-05-08

## 2024-05-04 RX ORDER — BENZONATATE 100 MG/1
100 CAPSULE ORAL 3 TIMES DAILY PRN
Qty: 20 CAPSULE | Refills: 0 | Status: SHIPPED | OUTPATIENT
Start: 2024-05-04

## 2024-05-04 NOTE — PROGRESS NOTES
"[facility]        NAME: Felice Jose is a 69 y.o. male  : 1955    MRN: [id number]  DATE: May 4, 2024  TIME: 11:57 AM    /84 (BP Location: Left arm, Patient Position: Sitting)   Pulse 78   Temp (!) 97.4 °F (36.3 °C) (Tympanic)   Resp 18   Ht 5' 10\" (1.778 m)   Wt 109 kg (240 lb)   SpO2 96%   BMI 34.44 kg/m²     Assessment and Plan   Bronchitis [J40]  1. Bronchitis  benzonatate (TESSALON PERLES) 100 mg capsule    azithromycin (ZITHROMAX) 250 mg tablet            Patient Instructions       Follow up with PCP in 3-5 days.  Proceed to  ER if symptoms worsen.    Chief Complaint     Chief Complaint   Patient presents with    Allergies     Allergy symptoms x 2 months with occassional wheezing.         History of Present Illness       Pt with slight productive cough and congestin for 2 months , lung problems in the past         Review of Systems   Review of Systems   Constitutional: Negative.    HENT:  Positive for congestion.    Eyes: Negative.    Respiratory:  Positive for cough.    Cardiovascular: Negative.    Gastrointestinal: Negative.    Endocrine: Negative.    Genitourinary: Negative.    Musculoskeletal: Negative.    Skin: Negative.    Allergic/Immunologic: Negative.    Hematological: Negative.    Psychiatric/Behavioral: Negative.     All other systems reviewed and are negative.        Current Medications       Current Outpatient Medications:     amLODIPine (NORVASC) 10 mg tablet, Take 10 mg by mouth daily, Disp: , Rfl:     azithromycin (ZITHROMAX) 250 mg tablet, Take 2 tablets today then 1 tablet daily x 4 days, Disp: 6 tablet, Rfl: 0    benzonatate (TESSALON PERLES) 100 mg capsule, Take 1 capsule (100 mg total) by mouth 3 (three) times a day as needed for cough, Disp: 20 capsule, Rfl: 0    cholecalciferol (VITAMIN D3) 1,000 units tablet, Take 2 tablets (2,000 Units total) by mouth daily, Disp: 60 tablet, Rfl: 1    cloNIDine (CATAPRES) 0.1 mg tablet, Take 0.1 mg by mouth every 12 " (twelve) hours, Disp: , Rfl:     hydrochlorothiazide (HYDRODIURIL) 25 mg tablet, Take 25 mg by mouth daily, Disp: , Rfl:     losartan (COZAAR) 50 mg tablet, Take 50 mg by mouth daily, Disp: , Rfl:     metFORMIN (GLUCOPHAGE-XR) 500 mg 24 hr tablet, Take 500 mg by mouth every morning, Disp: , Rfl:     NP Thyroid 60 MG tablet, TAKE 1 TABLET BY MOUTH ONCE DAILY IN THE MORNING FIRST THING IN IN THE MORNING WITH A GLASS OF WATER-NOTHING TO EAT OR DRINK FOR 30 MINUTES AFTERWARDS, Disp: , Rfl:     rosuvastatin (CRESTOR) 20 MG tablet, Take 20 mg by mouth daily at bedtime, Disp: , Rfl:     senna-docusate sodium (SENOKOT S) 8.6-50 mg per tablet, Take 1 tablet by mouth daily, Disp: 30 tablet, Rfl: 0    acetaminophen (TYLENOL) 500 mg tablet, Take 2 tablets (1,000 mg total) by mouth every 8 (eight) hours (Patient not taking: Reported on 5/4/2024), Disp: 60 tablet, Rfl: 0    ascorbic acid (VITAMIN C) 500 MG tablet, Take 1 tablet (500 mg total) by mouth 2 (two) times a day (Patient not taking: Reported on 5/4/2024), Disp: 30 tablet, Rfl: 3    aspirin (ECOTRIN LOW STRENGTH) 81 mg EC tablet, Take 1 tablet (81 mg total) by mouth 2 (two) times a day (Patient not taking: Reported on 11/14/2023), Disp: 60 tablet, Rfl: 0    celecoxib (CeleBREX) 200 mg capsule, Take 200 mg by mouth as needed (Patient not taking: Reported on 11/14/2023), Disp: , Rfl:     celecoxib (CeleBREX) 200 mg capsule, Take 1 capsule (200 mg total) by mouth 2 (two) times a day (Patient not taking: Reported on 11/14/2023), Disp: 60 capsule, Rfl: 0    folic acid (FOLVITE) 1 mg tablet, Take 1 tablet (1 mg total) by mouth daily (Patient not taking: Reported on 5/4/2024), Disp: 30 tablet, Rfl: 3    Multiple Vitamins-Minerals (multivitamin with minerals) tablet, Take 1 tablet by mouth daily (Patient not taking: Reported on 11/14/2023), Disp: 30 tablet, Rfl: 3    Omega-3 Fatty Acids (FISH OIL) 1,000 mg, Take 1,000 mg by mouth daily (Patient not taking: Reported on 10/3/2023),  Disp: , Rfl:     ondansetron (ZOFRAN-ODT) 4 mg disintegrating tablet, Take 1 tablet (4 mg total) by mouth every 6 (six) hours as needed for nausea or vomiting (Patient not taking: Reported on 11/14/2023), Disp: 20 tablet, Rfl: 0    Current Allergies     Allergies as of 05/04/2024 - Reviewed 05/04/2024   Allergen Reaction Noted    Lisinopril Cough 09/01/2021            The following portions of the patient's history were reviewed and updated as appropriate: allergies, current medications, past family history, past medical history, past social history, past surgical history and problem list.     Past Medical History:   Diagnosis Date    Abdominal pain     Acute infection of external ear     BPH with obstruction/lower urinary tract symptoms     BPH without urinary obstruction 11/2013    Chills (without fever)     Chronic sinusitis     Diabetes mellitus (HCC)     Disease of thyroid gland     Enlarged prostate     Fatty liver     Headache     Hematuria     Hypertension     Joint pain     Kidney stone     Legally blind in right eye, as defined in [place]     shadows only    Nausea and vomiting     Retention of urine     Streptococcal sore throat     Ureteral calculi     Wheezing        Past Surgical History:   Procedure Laterality Date    COLONOSCOPY  02/2010    by -colon polyps    COLONOSCOPY  12/28/2017    Anupam Shay M.D. / multiple polyps removed, 1.5 cm polyp ascending colon-tubulovillous adenoma, 20 mm polyp descending colon and 2 other smaller polyps all tubular adenomas    COLONOSCOPY  02/20/2019    Lincoln Bautista M.D. / 3 polyps-tubular adenomas, diverticulosis    COLONOSCOPY  11/10/2022    8 polyps tubular adenomas, biopsy of ileocecal valve showed hyperplastic polyp by Dr. Bautista    CYSTOSCOPY  2013    EYE SURGERY  2000    IR BIOPSY BONE MARROW  09/23/2021    KNEE ARTHROSCOPY Bilateral     [place] ARTHRP ACETBLR/PROX FEM PROSTC AGRFT/ALGRFT Left 8/21/2023    Procedure: ARTHROPLASTY HIP TOTAL;   "Surgeon: Shreyas Longo DO;  Location: [facility] OR;  Service: Orthopedics       Family History   Problem Relation Age of Onset    Breast cancer Mother     Cancer Father          Medications have been verified.        Objective   /84 (BP Location: Left arm, Patient Position: Sitting)   Pulse 78   Temp (!) 97.4 °F (36.3 °C) (Tympanic)   Resp 18   Ht 5' 10\" (1.778 m)   Wt 109 kg (240 lb)   SpO2 96%   BMI 34.44 kg/m²        Physical Exam     Physical Exam  Vitals and nursing note reviewed.   Constitutional:       Appearance: Normal appearance. He is normal weight.   HENT:      Head: Normocephalic and atraumatic.      Right Ear: Tympanic membrane, ear canal and external ear normal.      Left Ear: Tympanic membrane, ear canal and external ear normal.      Nose: Nose normal.      Mouth/Throat:      Mouth: Mucous membranes are moist.      Pharynx: Oropharynx is clear.   Cardiovascular:      Rate and Rhythm: Normal rate and regular rhythm.      Pulses: Normal pulses.      Heart sounds: Normal heart sounds.   Pulmonary:      Effort: Pulmonary effort is normal.      Comments: Minor coarse korey nds cleard with cough   Abdominal:      Palpations: Abdomen is soft.   Musculoskeletal:         General: Normal range of motion.      Cervical back: Normal range of motion and neck supple.   Skin:     General: Skin is warm.   Neurological:      Mental Status: He is alert and oriented to person, place, and time.                     "

## 2024-05-17 ENCOUNTER — HOSPITAL ENCOUNTER (EMERGENCY)
Facility: HOSPITAL | Age: 69
Discharge: HOME | End: 2024-05-17
Attending: EMERGENCY MEDICINE
Payer: COMMERCIAL

## 2024-05-17 ENCOUNTER — APPOINTMENT (EMERGENCY)
Dept: RADIOLOGY | Facility: HOSPITAL | Age: 69
End: 2024-05-17
Payer: COMMERCIAL

## 2024-05-17 VITALS
RESPIRATION RATE: 18 BRPM | BODY MASS INDEX: 34.54 KG/M2 | DIASTOLIC BLOOD PRESSURE: 93 MMHG | SYSTOLIC BLOOD PRESSURE: 172 MMHG | WEIGHT: 240.74 LBS | OXYGEN SATURATION: 95 % | HEART RATE: 83 BPM

## 2024-05-17 DIAGNOSIS — J40 BRONCHITIS: Primary | ICD-10-CM

## 2024-05-17 LAB
FLUAV RNA RESP QL NAA+PROBE: NEGATIVE
FLUBV RNA RESP QL NAA+PROBE: NEGATIVE
RSV RNA RESP QL NAA+PROBE: NEGATIVE
SARS-COV-2 RNA RESP QL NAA+PROBE: NEGATIVE

## 2024-05-17 PROCEDURE — 94640 AIRWAY INHALATION TREATMENT: CPT

## 2024-05-17 PROCEDURE — 99284 EMERGENCY DEPT VISIT MOD MDM: CPT | Performed by: EMERGENCY MEDICINE

## 2024-05-17 PROCEDURE — 71046 X-RAY EXAM CHEST 2 VIEWS: CPT

## 2024-05-17 PROCEDURE — 99283 EMERGENCY DEPT VISIT LOW MDM: CPT

## 2024-05-17 PROCEDURE — 0241U HB NFCT DS VIR RESP RNA 4 TRGT: CPT | Performed by: EMERGENCY MEDICINE

## 2024-05-17 RX ORDER — ALBUTEROL SULFATE 90 UG/1
2 AEROSOL, METERED RESPIRATORY (INHALATION) EVERY 4 HOURS PRN
Qty: 18 G | Refills: 0 | Status: SHIPPED | OUTPATIENT
Start: 2024-05-17

## 2024-05-17 RX ORDER — FLUTICASONE PROPIONATE 50 MCG
1 SPRAY, SUSPENSION (ML) NASAL DAILY
Qty: 16 G | Refills: 0 | Status: SHIPPED | OUTPATIENT
Start: 2024-05-17 | End: 2025-05-17

## 2024-05-17 RX ORDER — ALBUTEROL SULFATE 2.5 MG/3ML
5 SOLUTION RESPIRATORY (INHALATION) ONCE
Status: COMPLETED | OUTPATIENT
Start: 2024-05-17 | End: 2024-05-17

## 2024-05-17 RX ORDER — DOXYCYCLINE HYCLATE 100 MG/1
100 CAPSULE ORAL 2 TIMES DAILY
Qty: 10 CAPSULE | Refills: 0 | Status: SHIPPED | OUTPATIENT
Start: 2024-05-17 | End: 2024-05-22

## 2024-05-17 RX ORDER — PREDNISONE 20 MG/1
20 TABLET ORAL 2 TIMES DAILY
Qty: 10 TABLET | Refills: 0 | Status: SHIPPED | OUTPATIENT
Start: 2024-05-17

## 2024-05-17 RX ADMIN — ALBUTEROL SULFATE 5 MG: 2.5 SOLUTION RESPIRATORY (INHALATION) at 13:21

## 2024-05-17 NOTE — ED PROVIDER NOTES
"History  Chief Complaint   Patient presents with    URI     Pt reports having cough, sore throat, SOB, and nasal congestion for around 2 months. Pt reports symptoms worsened and took a COVID test yesterday, which came back positive.     70 yo M with oral controlled DM2, HTN, c/o \"months\" of cough, congestion, waxing and waning over the interval since he first noticed them.  He has been managing his symptoms with OTC medications, and was evaluated 5/4/24 at [facility], diagnosed with bronchitis, treated with Zpack, tessalon perles. He doesn't really notice any change.  He has not been treated with steroids. He was advised by PCP to check a COVID test with his symptoms, which he reports was positive today, so he came to the ED.  He denies fever, chills.            Prior to Admission Medications   Prescriptions Last Dose Informant Patient Reported? Taking?   Multiple Vitamins-Minerals (multivitamin with minerals) tablet Not Taking Self No No   Sig: Take 1 tablet by mouth daily   Patient not taking: Reported on 11/14/2023   NP Thyroid 60 MG tablet   Yes No   Sig: TAKE 1 TABLET BY MOUTH ONCE DAILY IN THE MORNING FIRST THING IN IN THE MORNING WITH A GLASS OF WATER-NOTHING TO EAT OR DRINK FOR 30 MINUTES AFTERWARDS   Omega-3 Fatty Acids (FISH OIL) 1,000 mg Not Taking Self Yes No   Sig: Take 1,000 mg by mouth daily   Patient not taking: Reported on 10/3/2023   acetaminophen (TYLENOL) 500 mg tablet Not Taking  No No   Sig: Take 2 tablets (1,000 mg total) by mouth every 8 (eight) hours   Patient not taking: Reported on 5/4/2024   amLODIPine (NORVASC) 10 mg tablet   Yes No   Sig: Take 10 mg by mouth daily   ascorbic acid (VITAMIN C) 500 MG tablet Not Taking Self No No   Sig: Take 1 tablet (500 mg total) by mouth 2 (two) times a day   Patient not taking: Reported on 5/4/2024   aspirin (ECOTRIN LOW STRENGTH) 81 mg EC tablet Not Taking  No No   Sig: Take 1 tablet (81 mg total) by mouth 2 (two) times a day   Patient not taking: " Reported on 11/14/2023   benzonatate (TESSALON PERLES) 100 mg capsule   No No   Sig: Take 1 capsule (100 mg total) by mouth 3 (three) times a day as needed for cough   celecoxib (CeleBREX) 200 mg capsule Not Taking Self Yes No   Sig: Take 200 mg by mouth as needed   Patient not taking: Reported on 11/14/2023   celecoxib (CeleBREX) 200 mg capsule Not Taking  No No   Sig: Take 1 capsule (200 mg total) by mouth 2 (two) times a day   Patient not taking: Reported on 11/14/2023   cholecalciferol (VITAMIN D3) 1,000 units tablet  Self No No   Sig: Take 2 tablets (2,000 Units total) by mouth daily   cloNIDine (CATAPRES) 0.1 mg tablet  Self Yes No   Sig: Take 0.1 mg by mouth every 12 (twelve) hours   folic acid (FOLVITE) 1 mg tablet Not Taking Self No No   Sig: Take 1 tablet (1 mg total) by mouth daily   Patient not taking: Reported on 5/4/2024   hydrochlorothiazide (HYDRODIURIL) 25 mg tablet  Self Yes No   Sig: Take 25 mg by mouth daily   losartan (COZAAR) 50 mg tablet   Yes No   Sig: Take 50 mg by mouth daily   metFORMIN (GLUCOPHAGE-XR) 500 mg 24 hr tablet  Self Yes No   Sig: Take 500 mg by mouth every morning   ondansetron (ZOFRAN-ODT) 4 mg disintegrating tablet Not Taking  No No   Sig: Take 1 tablet (4 mg total) by mouth every 6 (six) hours as needed for nausea or vomiting   Patient not taking: Reported on 11/14/2023   rosuvastatin (CRESTOR) 20 MG tablet  Self Yes No   Sig: Take 20 mg by mouth daily at bedtime   senna-docusate sodium (SENOKOT S) 8.6-50 mg per tablet   No No   Sig: Take 1 tablet by mouth daily      Facility-Administered Medications: None       Past Medical History:   Diagnosis Date    Abdominal pain     Acute infection of external ear     BPH with obstruction/lower urinary tract symptoms     BPH without urinary obstruction 11/2013    Chills (without fever)     Chronic sinusitis     Diabetes mellitus (HCC)     Disease of thyroid gland     Enlarged prostate     Fatty liver     Headache     Hematuria      Hypertension     Joint pain     Kidney stone     Legally blind in right eye, as defined in [place]     shadows only    Nausea and vomiting     Retention of urine     Streptococcal sore throat     Ureteral calculi     Wheezing        Past Surgical History:   Procedure Laterality Date    COLONOSCOPY  02/2010    by -colon polyps    COLONOSCOPY  12/28/2017    Anupam Shay M.D. / multiple polyps removed, 1.5 cm polyp ascending colon-tubulovillous adenoma, 20 mm polyp descending colon and 2 other smaller polyps all tubular adenomas    COLONOSCOPY  02/20/2019    Lincoln Bautista M.D. / 3 polyps-tubular adenomas, diverticulosis    COLONOSCOPY  11/10/2022    8 polyps tubular adenomas, biopsy of ileocecal valve showed hyperplastic polyp by Dr. Bautista    CYSTOSCOPY  2013    EYE SURGERY  2000    IR BIOPSY BONE MARROW  09/23/2021    KNEE ARTHROSCOPY Bilateral     [place] ARTHRP ACETBLR/PROX FEM PROSTC AGRFT/ALGRFT Left 8/21/2023    Procedure: ARTHROPLASTY HIP TOTAL;  Surgeon: Shreyas Longo DO;  Location: [facility];  Service: Orthopedics       Family History   Problem Relation Age of Onset    Breast cancer Mother     Cancer Father      I have reviewed and agree with the history as documented.    E-Cigarette/Vaping    E-Cigarette Use Never User      E-Cigarette/Vaping Substances    Nicotine No     THC No     CBD No     Flavoring No     Other No     Unknown No      Social History     Tobacco Use    Smoking status: Never     Passive exposure: Never    Smokeless tobacco: Never   Vaping Use    Vaping status: Never Used   Substance Use Topics    Alcohol use: Yes     Alcohol/week: 8.0 standard drinks of alcohol     Types: 2 Cans of beer, 6 Standard drinks or equivalent per week     Comment: occ    Drug use: Never       Review of Systems    Physical Exam  Physical Exam  Vitals and nursing note reviewed.   Constitutional:       Appearance: He is well-developed. He is obese.   HENT:      Head: Normocephalic and  atraumatic.      Right Ear: External ear normal.      Left Ear: External ear normal.      Nose: Congestion present.      Mouth/Throat:      Mouth: Mucous membranes are moist.   Eyes:      Conjunctiva/sclera: Conjunctivae normal.      Pupils: Pupils are equal, round, and reactive to light.   Cardiovascular:      Rate and Rhythm: Normal rate.   Pulmonary:      Effort: Pulmonary effort is normal.   Abdominal:      Tenderness: There is no abdominal tenderness.   Musculoskeletal:         General: Normal range of motion.      Cervical back: Normal range of motion and neck supple.   Skin:     General: Skin is warm and dry.      Capillary Refill: Capillary refill takes less than 2 seconds.   Neurological:      Mental Status: He is alert and oriented to person, place, and time.      Cranial Nerves: No cranial nerve deficit.      Coordination: Coordination normal.   Psychiatric:         Behavior: Behavior normal.         Thought Content: Thought content normal.         Judgment: Judgment normal.         Vital Signs  ED Triage Vitals [05/17/24 1301]   Temp Pulse Respirations Blood Pressure SpO2   -- 83 18 (!) 172/93 95 %      Temp src Heart Rate Source Patient Position - Orthostatic VS BP Location FiO2 (%)   -- Monitor Sitting Right arm --      Pain Score       --           Vitals:    05/17/24 1301   BP: (!) 172/93   Pulse: 83   Patient Position - Orthostatic VS: Sitting         Visual Acuity      ED Medications  Medications   albuterol inhalation solution 5 mg (5 mg Nebulization Given 5/17/24 1321)       Diagnostic Studies  Results Reviewed       Procedure Component Value Units Date/Time    FLU/RSV/COVID - if FLU/RSV clinically relevant [[id number]]  (Normal) Collected: 05/17/24 1321    Lab Status: Final result Specimen: Nares from Nose Updated: 05/17/24 1430     SARS-CoV-2 Negative     INFLUENZA A PCR Negative     INFLUENZA B PCR Negative     RSV PCR Negative    Narrative:      FOR PEDIATRIC PATIENTS - copy/paste COVID  Guidelines URL to browser: [email or web address]    SARS-CoV-2 assay is a Nucleic Acid Amplification assay intended for the  qualitative detection of nucleic acid from SARS-CoV-2 in nasopharyngeal  swabs. Results are for the presumptive identification of SARS-CoV-2 RNA.    Positive results are indicative of infection with SARS-CoV-2, the virus  causing COVID-19, but do not rule out bacterial infection or co-infection  with other viruses. Laboratories within the [place] and its  territories are required to report all positive results to the appropriate  public health authorities. Negative results do not preclude SARS-CoV-2  infection and should not be used as the sole basis for treatment or other  patient management decisions. Negative results must be combined with  clinical observations, patient history, and epidemiological information.  This test has not been FDA cleared or approved.    This test has been authorized by FDA under an Emergency Use Authorization  (EUA). This test is only authorized for the duration of time the  declaration that circumstances exist justifying the authorization of the  emergency use of an in vitro diagnostic tests for detection of SARS-CoV-2  virus and/or diagnosis of COVID-19 infection under section 564(b)(1) of  the Act, 21 U.S.C. 360bbb-3(b)(1), unless the authorization is terminated  or revoked sooner. The test has been validated but independent review by FDA  and CLIA is pending.    Test performed using The [employer] GeneXpert: This RT-PCR assay targets N2,  a region unique to SARS-CoV-2. A conserved region in the E-gene was chosen  for pan-Sarbecovirus detection which includes SARS-CoV-2.    According to CMS-2020-01-R, this platform meets the definition of high-throughput technology.                   XR chest 2 views    (Results Pending)              Procedures  Procedures         ED Course  ED Course as of 05/17/24 1643   Fri  May 17, 2024   1404 XR chest 2 views  My independent interpretation of imaging:   No acute findings      1434 SARS-COV-2: Negative  His home test was likely a spurious positive                               SBIRT 20yo+      Flowsheet Row Most Recent Value   Initial Alcohol Screen: US AUDIT-C     1. How often do you have a drink containing alcohol? 2 Filed at: 05/17/2024 1301   2. How many drinks containing alcohol do you have on a typical day you are drinking?  1 Filed at: 05/17/2024 1301   3b. FEMALE Any Age, or MALE 65+: How often do you have 4 or more drinks on one occassion? 0 Filed at: 05/17/2024 1301   Audit-C Score 3 Filed at: 05/17/2024 1301   ROMAN: How many times in the past year have you...    Used an illegal drug or used a prescription medication for non-medical reasons? Never Filed at: 05/17/2024 1301                      Medical Decision Making  Amount and/or Complexity of Data Reviewed  Labs:  Decision-making details documented in ED Course.  Radiology: ordered. Decision-making details documented in ED Course.    Risk  Prescription drug management.             Disposition  Final diagnoses:   Bronchitis     Time reflects when diagnosis was documented in both MDM as applicable and the Disposition within this note       Time User Action Codes Description Comment    5/17/2024  2:35 PM Stevan Reyes Add [J40] Bronchitis           ED Disposition       ED Disposition   Discharge    Condition   Good    Date/Time   Fri May 17, 2024 1434    Comment   Felice Jose discharge to home/self care.                   Follow-up Information       Follow up With Specialties Details Why Contact Info    Cary Sprague, DO Family Medicine Schedule an appointment as soon as possible for a visit  For followup [address] [place] [address]  [address] [address]  [phone]              Discharge Medication List as of 5/17/2024  2:37 PM        START taking these medications    Details   albuterol (PROVENTIL  HFA,VENTOLIN HFA) 90 mcg/act inhaler Inhale 2 puffs every 4 (four) hours as needed for wheezing (or cough), Starting Fri 5/17/2024, Normal      doxycycline hyclate (VIBRAMYCIN) 100 mg capsule Take 1 capsule (100 mg total) by mouth 2 (two) times a day for 5 days, Starting Fri 5/17/2024, Until Wed 5/22/2024, Normal      fluticasone (FLONASE) 50 mcg/act nasal spray 1 spray into each nostril daily, Starting Fri 5/17/2024, Until Sat 5/17/2025, Normal      predniSONE 20 mg tablet Take 1 tablet (20 mg total) by mouth 2 (two) times a day, Starting Fri 5/17/2024, Normal           CONTINUE these medications which have NOT CHANGED    Details   amLODIPine (NORVASC) 10 mg tablet Take 10 mg by mouth daily, Starting Mon 4/24/2023, Historical Med      aspirin (ECOTRIN LOW STRENGTH) 81 mg EC tablet Take 1 tablet (81 mg total) by mouth 2 (two) times a day, Starting Mon 8/21/2023, Normal      benzonatate (TESSALON PERLES) 100 mg capsule Take 1 capsule (100 mg total) by mouth 3 (three) times a day as needed for cough, Starting Sat 5/4/2024, Normal      cholecalciferol (VITAMIN D3) 1,000 units tablet Take 2 tablets (2,000 Units total) by mouth daily, Starting Thu 4/6/2023, Normal      cloNIDine (CATAPRES) 0.1 mg tablet Take 0.1 mg by mouth every 12 (twelve) hours, Starting Fri 7/23/2021, Historical Med      hydrochlorothiazide (HYDRODIURIL) 25 mg tablet Take 25 mg by mouth daily, Starting Fri 7/23/2021, Historical Med      losartan (COZAAR) 50 mg tablet Take 50 mg by mouth daily, Starting Tue 6/6/2023, Historical Med      metFORMIN (GLUCOPHAGE-XR) 500 mg 24 hr tablet Take 500 mg by mouth every morning, Starting Fri 10/7/2022, Historical Med      NP Thyroid 60 MG tablet TAKE 1 TABLET BY MOUTH ONCE DAILY IN THE MORNING FIRST THING IN IN THE MORNING WITH A GLASS OF WATER-NOTHING TO EAT OR DRINK FOR 30 MINUTES AFTERWARDS, Historical Med      rosuvastatin (CRESTOR) 20 MG tablet Take 20 mg by mouth daily at bedtime, Starting Tue 2/7/2023,  Historical Med      senna-docusate sodium (SENOKOT S) 8.6-50 mg per tablet Take 1 tablet by mouth daily, Starting Mon 8/21/2023, Normal           STOP taking these medications       acetaminophen (TYLENOL) 500 mg tablet Comments:   Reason for Stopping:         ascorbic acid (VITAMIN C) 500 MG tablet Comments:   Reason for Stopping:         celecoxib (CeleBREX) 200 mg capsule Comments:   Reason for Stopping:         celecoxib (CeleBREX) 200 mg capsule Comments:   Reason for Stopping:         folic acid (FOLVITE) 1 mg tablet Comments:   Reason for Stopping:         Multiple Vitamins-Minerals (multivitamin with minerals) tablet Comments:   Reason for Stopping:         Omega-3 Fatty Acids (FISH OIL) 1,000 mg Comments:   Reason for Stopping:         ondansetron (ZOFRAN-ODT) 4 mg disintegrating tablet Comments:   Reason for Stopping:               No discharge procedures on file.    PDMP Review         Value Time User    PDMP Reviewed  Yes 8/21/2023  3:07 PM Patricia Calles PA-C            ED Provider  Electronically Signed by             Stevan Reyes MD  05/17/24 5631

## 2024-05-17 NOTE — DISCHARGE INSTRUCTIONS
Acute Bronchitis   WHAT YOU NEED TO KNOW:   Acute bronchitis is swelling and irritation in the air passages of your lungs. This irritation may cause you to cough or have other breathing problems. Acute bronchitis often starts because of another illness, such as a cold or the flu. The illness spreads from your nose and throat to your windpipe and airways. Bronchitis is often called a chest cold. Acute bronchitis lasts about 3 to 6 weeks and is usually not a serious illness. Your cough can last for several weeks.   DISCHARGE INSTRUCTIONS:   Return to the emergency department if:   You cough up blood.    Your lips or fingernails turn blue.    You feel like you are not getting enough air when you breathe.  Contact your healthcare provider if:   You have a fever.    Your breathing problems do not go away or get worse.    Your cough does not get better within 4 weeks.    You have questions or concerns about your condition or care.  Avoid irritants in the air.  Avoid chemicals, fumes, and dust. Wear a face mask if you must work around dust or fumes. Stay inside on days when air pollution levels are high. If you have allergies, stay inside when pollen counts are high. Do not use aerosol products, such as spray-on deodorant, bug spray, and hair spray.    Do not smoke or be around others who smoke.  Nicotine and other chemicals in cigarettes and cigars damages the cilia that move mucus out of your lungs. Ask your healthcare provider for information if you currently smoke and need help to quit. E-cigarettes or smokeless tobacco still contain nicotine. Talk to your healthcare provider before you use these products.     Drink liquids as directed.  Liquids help keep your air passages moist and help you cough up mucus. You may need to drink more liquids when you have acute bronchitis. Ask how much liquid to drink each day and which liquids are best for you.    Use a humidifier or vaporizer.  Use a cool mist humidifier or a  vaporizer to increase air moisture in your home. This may make it easier for you to breathe and help decrease your cough.    Decongestants  help loosen mucus in your lungs and make it easier to cough up. This can help you breathe easier.    Cough suppressants  decrease your urge to cough. If your cough produces mucus, do not take a cough suppressant unless your healthcare provider tells you to. Your healthcare provider may suggest that you take a cough suppressant at night so you can rest.    Inhalers  An inhaler gives you medicine to open your airways.

## 2024-07-31 ENCOUNTER — TELEPHONE (OUTPATIENT)
Dept: OBGYN CLINIC | Facility: CLINIC | Age: 69
End: 2024-07-31

## 2024-08-09 ENCOUNTER — APPOINTMENT (OUTPATIENT)
Dept: LAB | Facility: IMAGING CENTER | Age: 69
End: 2024-08-09
Payer: COMMERCIAL

## 2024-08-09 DIAGNOSIS — Z79.899 ENCOUNTER FOR LONG-TERM (CURRENT) USE OF OTHER MEDICATIONS: ICD-10-CM

## 2024-08-09 DIAGNOSIS — E03.9 HYPOTHYROIDISM, ADULT: ICD-10-CM

## 2024-08-09 DIAGNOSIS — R31.9 HEMATURIA, UNSPECIFIED TYPE: ICD-10-CM

## 2024-08-09 DIAGNOSIS — H54.413A BLINDNESS RIGHT EYE CATEGORY 3, NORMAL VISION LEFT EYE: ICD-10-CM

## 2024-08-09 DIAGNOSIS — I10 ESSENTIAL HYPERTENSION, MALIGNANT: ICD-10-CM

## 2024-08-09 DIAGNOSIS — R94.5 ABNORMAL RESULTS OF LIVER FUNCTION STUDIES: ICD-10-CM

## 2024-08-09 DIAGNOSIS — E78.00 PURE HYPERCHOLESTEROLEMIA: ICD-10-CM

## 2024-08-09 DIAGNOSIS — E55.9 VITAMIN D DEFICIENCY, UNSPECIFIED: ICD-10-CM

## 2024-08-09 DIAGNOSIS — E11.9 DIABETES MELLITUS WITHOUT COMPLICATION (HCC): ICD-10-CM

## 2024-08-09 DIAGNOSIS — I71.9 AORTIC ANEURYSM WITHOUT RUPTURE, UNSPECIFIED PORTION OF AORTA (HCC): ICD-10-CM

## 2024-08-09 DIAGNOSIS — D47.2 MONOCLONAL GAMMOPATHY, BENIGN: ICD-10-CM

## 2024-08-09 DIAGNOSIS — F10.10 ALCOHOL ABUSE: ICD-10-CM

## 2024-08-09 LAB
25(OH)D3 SERPL-MCNC: 48.1 NG/ML (ref 30–100)
ALBUMIN SERPL BCG-MCNC: 3.9 G/DL (ref 3.5–5)
ALP SERPL-CCNC: 54 U/L (ref 34–104)
ALT SERPL W P-5'-P-CCNC: 85 U/L (ref 7–52)
ANION GAP SERPL CALCULATED.3IONS-SCNC: 11 MMOL/L (ref 4–13)
AST SERPL W P-5'-P-CCNC: 66 U/L (ref 13–39)
BACTERIA UR QL AUTO: ABNORMAL /HPF
BACTERIA UR QL AUTO: ABNORMAL /HPF
BASOPHILS # BLD AUTO: 0.03 THOUSANDS/ÂΜL (ref 0–0.1)
BASOPHILS NFR BLD AUTO: 1 % (ref 0–1)
BILIRUB SERPL-MCNC: 0.81 MG/DL (ref 0.2–1)
BILIRUB UR QL STRIP: NEGATIVE
BILIRUB UR QL STRIP: NEGATIVE
BUN SERPL-MCNC: 14 MG/DL (ref 5–25)
CALCIUM SERPL-MCNC: 9.4 MG/DL (ref 8.4–10.2)
CHLORIDE SERPL-SCNC: 103 MMOL/L (ref 96–108)
CHOLEST SERPL-MCNC: 229 MG/DL
CLARITY UR: CLEAR
CLARITY UR: CLEAR
CO2 SERPL-SCNC: 26 MMOL/L (ref 21–32)
COLOR UR: YELLOW
COLOR UR: YELLOW
CREAT SERPL-MCNC: 0.99 MG/DL (ref 0.6–1.3)
CREAT UR-MCNC: 224.9 MG/DL
EOSINOPHIL # BLD AUTO: 0.11 THOUSAND/ÂΜL (ref 0–0.61)
EOSINOPHIL NFR BLD AUTO: 2 % (ref 0–6)
ERYTHROCYTE [DISTWIDTH] IN BLOOD BY AUTOMATED COUNT: 12.4 % (ref 11.6–15.1)
EST. AVERAGE GLUCOSE BLD GHB EST-MCNC: 177 MG/DL
GFR SERPL CREATININE-BSD FRML MDRD: 78 ML/MIN/1.73SQ M
GLUCOSE P FAST SERPL-MCNC: 157 MG/DL (ref 65–99)
GLUCOSE UR STRIP-MCNC: NEGATIVE MG/DL
GLUCOSE UR STRIP-MCNC: NEGATIVE MG/DL
HBA1C MFR BLD: 7.8 %
HCT VFR BLD AUTO: 49.2 % (ref 36.5–49.3)
HDLC SERPL-MCNC: 34 MG/DL
HGB BLD-MCNC: 16.8 G/DL (ref 12–17)
HGB UR QL STRIP.AUTO: ABNORMAL
HGB UR QL STRIP.AUTO: ABNORMAL
HYALINE CASTS #/AREA URNS LPF: ABNORMAL /LPF
HYALINE CASTS #/AREA URNS LPF: ABNORMAL /LPF
IMM GRANULOCYTES # BLD AUTO: 0.01 THOUSAND/UL (ref 0–0.2)
IMM GRANULOCYTES NFR BLD AUTO: 0 % (ref 0–2)
KETONES UR STRIP-MCNC: NEGATIVE MG/DL
KETONES UR STRIP-MCNC: NEGATIVE MG/DL
LEUKOCYTE ESTERASE UR QL STRIP: NEGATIVE
LEUKOCYTE ESTERASE UR QL STRIP: NEGATIVE
LYMPHOCYTES # BLD AUTO: 1.92 THOUSANDS/ÂΜL (ref 0.6–4.47)
LYMPHOCYTES NFR BLD AUTO: 36 % (ref 14–44)
MCH RBC QN AUTO: 34.4 PG (ref 26.8–34.3)
MCHC RBC AUTO-ENTMCNC: 34.1 G/DL (ref 31.4–37.4)
MCV RBC AUTO: 101 FL (ref 82–98)
MICROALBUMIN UR-MCNC: 32.7 MG/L
MICROALBUMIN/CREAT 24H UR: 15 MG/G CREATININE (ref 0–30)
MONOCYTES # BLD AUTO: 0.44 THOUSAND/ÂΜL (ref 0.17–1.22)
MONOCYTES NFR BLD AUTO: 8 % (ref 4–12)
MUCOUS THREADS UR QL AUTO: ABNORMAL
MUCOUS THREADS UR QL AUTO: ABNORMAL
NEUTROPHILS # BLD AUTO: 2.83 THOUSANDS/ÂΜL (ref 1.85–7.62)
NEUTS SEG NFR BLD AUTO: 53 % (ref 43–75)
NITRITE UR QL STRIP: NEGATIVE
NITRITE UR QL STRIP: NEGATIVE
NON-SQ EPI CELLS URNS QL MICRO: ABNORMAL /HPF
NON-SQ EPI CELLS URNS QL MICRO: ABNORMAL /HPF
NONHDLC SERPL-MCNC: 195 MG/DL
NRBC BLD AUTO-RTO: 0 /100 WBCS
PH UR STRIP.AUTO: 6 [PH]
PH UR STRIP.AUTO: 6 [PH]
PLATELET # BLD AUTO: 206 THOUSANDS/UL (ref 149–390)
PMV BLD AUTO: 11.3 FL (ref 8.9–12.7)
POTASSIUM SERPL-SCNC: 4.3 MMOL/L (ref 3.5–5.3)
PROT SERPL-MCNC: 7.6 G/DL (ref 6.4–8.4)
PROT UR STRIP-MCNC: ABNORMAL MG/DL
PROT UR STRIP-MCNC: ABNORMAL MG/DL
PSA SERPL-MCNC: 0.5 NG/ML (ref 0–4)
RBC # BLD AUTO: 4.88 MILLION/UL (ref 3.88–5.62)
RBC #/AREA URNS AUTO: ABNORMAL /HPF
RBC #/AREA URNS AUTO: ABNORMAL /HPF
SODIUM SERPL-SCNC: 140 MMOL/L (ref 135–147)
SP GR UR STRIP.AUTO: 1.02 (ref 1–1.03)
SP GR UR STRIP.AUTO: 1.02 (ref 1–1.03)
TRIGL SERPL-MCNC: 402 MG/DL
TSH SERPL DL<=0.05 MIU/L-ACNC: 4.11 UIU/ML (ref 0.45–4.5)
UROBILINOGEN UR STRIP-ACNC: <2 MG/DL
UROBILINOGEN UR STRIP-ACNC: <2 MG/DL
WBC # BLD AUTO: 5.34 THOUSAND/UL (ref 4.31–10.16)
WBC #/AREA URNS AUTO: ABNORMAL /HPF
WBC #/AREA URNS AUTO: ABNORMAL /HPF

## 2024-08-09 PROCEDURE — 82043 UR ALBUMIN QUANTITATIVE: CPT

## 2024-08-09 PROCEDURE — 80053 COMPREHEN METABOLIC PANEL: CPT

## 2024-08-09 PROCEDURE — 84443 ASSAY THYROID STIM HORMONE: CPT

## 2024-08-09 PROCEDURE — 83036 HEMOGLOBIN GLYCOSYLATED A1C: CPT

## 2024-08-09 PROCEDURE — 36415 COLL VENOUS BLD VENIPUNCTURE: CPT

## 2024-08-09 PROCEDURE — 81001 URINALYSIS AUTO W/SCOPE: CPT

## 2024-08-09 PROCEDURE — 85025 COMPLETE CBC W/AUTO DIFF WBC: CPT

## 2024-08-09 PROCEDURE — 80061 LIPID PANEL: CPT

## 2024-08-09 PROCEDURE — 82570 ASSAY OF URINE CREATININE: CPT

## 2024-08-09 PROCEDURE — G0103 PSA SCREENING: HCPCS

## 2024-08-09 PROCEDURE — 82306 VITAMIN D 25 HYDROXY: CPT

## 2024-08-13 ENCOUNTER — APPOINTMENT (OUTPATIENT)
Dept: RADIOLOGY | Facility: MEDICAL CENTER | Age: 69
End: 2024-08-13
Payer: COMMERCIAL

## 2024-08-13 ENCOUNTER — OFFICE VISIT (OUTPATIENT)
Dept: OBGYN CLINIC | Facility: MEDICAL CENTER | Age: 69
End: 2024-08-13
Payer: COMMERCIAL

## 2024-08-13 VITALS
WEIGHT: 240 LBS | SYSTOLIC BLOOD PRESSURE: 138 MMHG | HEIGHT: 70 IN | DIASTOLIC BLOOD PRESSURE: 93 MMHG | BODY MASS INDEX: 34.36 KG/M2 | HEART RATE: 73 BPM

## 2024-08-13 DIAGNOSIS — Z96.642 STATUS POST TOTAL REPLACEMENT OF LEFT HIP: ICD-10-CM

## 2024-08-13 DIAGNOSIS — Z96.642 STATUS POST TOTAL REPLACEMENT OF LEFT HIP: Primary | ICD-10-CM

## 2024-08-13 PROCEDURE — 99213 OFFICE O/P EST LOW 20 MIN: CPT | Performed by: STUDENT IN AN ORGANIZED HEALTH CARE EDUCATION/TRAINING PROGRAM

## 2024-08-13 PROCEDURE — 73502 X-RAY EXAM HIP UNI 2-3 VIEWS: CPT

## 2024-08-13 NOTE — PROGRESS NOTES
Hip Follow Up Note    Assessment:     1. Status post total replacement of left hip        Plan:   Diagnoses and all orders for this visit:    Status post total replacement of left hip  -     XR hip/pelv 2-3 vws left if performed; Future         Felice presents to the office today one year status post left total hip arthroplasty performed 8/21/2024. I am very pleased with his clinical and radiographic presentation in the office today. He may continue to participate in all activities as tolerated. He may use OTC medication for pain relief as needed. We will plan to see him back in 3 years for repeat evaluation and x-ray of the left hip.     Subjective:     Patient ID: Felice Jose is a 69 y.o. male.  Chief Complaint:  HPI:  69 y.o. male one year status post left total hip arthroplasty DOS 8/21/2023. He is doing well overall. He denies pain, paraesthesias, mechanical symptoms at this time. Notes he has returned to all his desired activities. 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       Allergy:  Allergies   Allergen Reactions    Lisinopril Cough     Medications:  all current active meds have been reviewed  Past Medical History:  Past Medical History:   Diagnosis Date    Abdominal pain     Acute infection of external ear     BPH with obstruction/lower urinary tract symptoms     BPH without urinary obstruction 11/2013    Chills (without fever)     Chronic sinusitis     Diabetes mellitus (HCC)     Disease of thyroid gland     Enlarged prostate     Fatty liver     Headache     Hematuria     Hypertension     Joint pain     Kidney stone     Legally blind in right eye, as defined in [place]     shadows only    Nausea and vomiting     Retention of urine     Streptococcal sore throat     Ureteral calculi     Wheezing      Past Surgical History:  Past Surgical History:   Procedure Laterality Date    COLONOSCOPY  02/2010    by -colon polyps    COLONOSCOPY  12/28/2017    Anupam Shay M.D. /  multiple polyps removed, 1.5 cm polyp ascending colon-tubulovillous adenoma, 20 mm polyp descending colon and 2 other smaller polyps all tubular adenomas    COLONOSCOPY  02/20/2019    Lincoln Bautista M.D. / 3 polyps-tubular adenomas, diverticulosis    COLONOSCOPY  11/10/2022    8 polyps tubular adenomas, biopsy of ileocecal valve showed hyperplastic polyp by Dr. Bautista    CYSTOSCOPY  2013    EYE SURGERY  2000    IR BIOPSY BONE MARROW  09/23/2021    KNEE ARTHROSCOPY Bilateral     MI ARTHRP ACETBLR/PROX FEM PROSTC AGRFT/ALGRFT Left 8/21/2023    Procedure: ARTHROPLASTY HIP TOTAL;  Surgeon: Shreyas Longo DO;  Location: [facility];  Service: Orthopedics     Family History:  Family History   Problem Relation Age of Onset    Breast cancer Mother     Cancer Father      Social History:  Social History     Substance and Sexual Activity   Alcohol Use Yes    Alcohol/week: 8.0 standard drinks of alcohol    Types: 2 Cans of beer, 6 Standard drinks or equivalent per week    Comment: occ     Social History     Substance and Sexual Activity   Drug Use Never     Social History     Tobacco Use   Smoking Status Never    Passive exposure: Never   Smokeless Tobacco Never           ROS:  General: Per HPI  Skin: Negative, except if noted below  HEENT: Negative  Respiratory: Negative  Cardiovascular: Negative  Gastrointestinal: Negative  Urinary: Negative  Vascular: Negative  Musculoskeletal: Positive per HPI   Neurologic: Positive per HPI  Endocrine: Negative    Objective:  BP Readings from Last 1 Encounters:   08/13/24 138/93      Wt Readings from Last 1 Encounters:   08/13/24 109 kg (240 lb)        Respiratory:   non-labored respirations    Lymphatics:  no palpable lymph nodes    Gait and Station:   normal    Neurologic:   Alert and oriented times 3  Patient with normal sensation except as noted below  Deep tendon reflexes 2+ except as noted in MSK exam    Bilateral Lower Extremity:  Left Hip     Inspection: skin intact, well  "perfused. Surgical incision well healed with no evidence of infection    Range of Motion: equal to contralateral hip without pain    - log roll    - Trendelenburg sign    Motor: 5/5 Q/HS/TA/GS/P    Pulses: 2+ DP / 2+ PT    SILT DP/SP/S/S/TN    Right Hip     Inspection: skin intact    Range of Motion: WNL w/o pain    - log roll    - Trendelenburg sign    Motor: 5/5 Q/HS/TA/GS/P    Pulses: 2+ DP / 2+ PT    SILT DP/SP/S/S/TN    Imaging:  My interpretation XR AP pelvis/ left hip: total hip arthroplasty prothesis in excellent positional alignment with no evidence of loosening or failure    BMI:   Estimated body mass index is 34.44 kg/m² as calculated from the following:    Height as of this encounter: 5' 10\" (1.778 m).    Weight as of this encounter: 109 kg (240 lb).  BSA:   Estimated body surface area is 2.26 meters squared as calculated from the following:    Height as of this encounter: 5' 10\" (1.778 m).    Weight as of this encounter: 109 kg (240 lb).           Scribe Attestation      I,:  Marielle Givens am acting as a scribe while in the presence of the attending physician.:       I,:  Shreyas Longo, DO personally performed the services described in this documentation    as scribed in my presence.:             "

## 2024-08-14 ENCOUNTER — HOSPITAL ENCOUNTER (OUTPATIENT)
Dept: NON INVASIVE DIAGNOSTICS | Facility: HOSPITAL | Age: 69
Discharge: HOME | End: 2024-08-14
Payer: COMMERCIAL

## 2024-08-14 DIAGNOSIS — I71.43 INFRARENAL ABDOMINAL AORTIC ANEURYSM (AAA) WITHOUT RUPTURE (HCC): Chronic | ICD-10-CM

## 2024-08-14 PROCEDURE — 93978 VASCULAR STUDY: CPT | Performed by: SURGERY

## 2024-08-14 PROCEDURE — 93978 VASCULAR STUDY: CPT

## 2024-09-10 ENCOUNTER — TELEPHONE (OUTPATIENT)
Dept: VASCULAR SURGERY | Facility: CLINIC | Age: 69
End: 2024-09-10

## 2024-09-27 ENCOUNTER — OFFICE VISIT (OUTPATIENT)
Dept: VASCULAR SURGERY | Facility: CLINIC | Age: 69
End: 2024-09-27
Payer: COMMERCIAL

## 2024-09-27 VITALS
HEIGHT: 70 IN | BODY MASS INDEX: 33.93 KG/M2 | SYSTOLIC BLOOD PRESSURE: 128 MMHG | DIASTOLIC BLOOD PRESSURE: 70 MMHG | WEIGHT: 237 LBS | HEART RATE: 68 BPM

## 2024-09-27 DIAGNOSIS — E66.811 CLASS 1 OBESITY DUE TO EXCESS CALORIES WITHOUT SERIOUS COMORBIDITY WITH BODY MASS INDEX (BMI) OF 32.0 TO 32.9 IN ADULT: ICD-10-CM

## 2024-09-27 DIAGNOSIS — E66.09 CLASS 1 OBESITY DUE TO EXCESS CALORIES WITHOUT SERIOUS COMORBIDITY WITH BODY MASS INDEX (BMI) OF 32.0 TO 32.9 IN ADULT: ICD-10-CM

## 2024-09-27 DIAGNOSIS — Z87.891 FORMER SMOKER: ICD-10-CM

## 2024-09-27 DIAGNOSIS — I71.43 INFRARENAL ABDOMINAL AORTIC ANEURYSM (AAA) WITHOUT RUPTURE (HCC): Primary | Chronic | ICD-10-CM

## 2024-09-27 PROCEDURE — 99213 OFFICE O/P EST LOW 20 MIN: CPT | Performed by: SURGERY

## 2024-09-27 NOTE — PROGRESS NOTES
Ambulatory Visit  Name: Felice Jose      : 1955      MRN: [id number]  Encounter Provider: Zarina Yousif MD  Encounter Date: 2024   Enc[facility] department: [facility]    Assessment & Plan  Infrarenal abdominal aortic aneurysm (AAA) without rupture (HCC)  3.5 cm infrarenal AAA.  We discussed different appraoches  He is interested in participating in clinical trial [facility]  I will have him go over screening next week.    I have given him literature to read.    Avoid heavy lifting over 30lbs  Daily walking    Take your aspirin daily  Take daily rosuvasatin           Former smoker  Quit several years ago       Class 1 obesity due to excess calories without serious comorbidity with body mass index (BMI) of 32.0 to 32.9 in adult  Daily walking and diet control has been recommended.         History of Present Illness     Felice Jose is a 69 y.o. male who presents Patient for evaluation of abdominal aortic aneurysm.  Denies any back pain or abdominal pain.  Was diagnosed about 2 years ago when he had a CAT scan for some other reason.    Patient presents to review US abd/ AOIL for known AAA.     History obtained from : patient  Review of Systems   Constitutional: Negative.    HENT: Negative.     Eyes: Negative.    Respiratory: Negative.     Cardiovascular: Negative.    Gastrointestinal: Negative.    Endocrine: Negative.    Genitourinary: Negative.    Musculoskeletal: Negative.    Skin: Negative.    Allergic/Immunologic: Negative.    Neurological: Negative.    Hematological: Negative.    Psychiatric/Behavioral: Negative.       Past Medical History   Past Medical History:   Diagnosis Date    Abdominal pain     Acute infection of external ear     BPH with obstruction/lower urinary tract symptoms     BPH without urinary obstruction 2013    Chills (without fever)     Chronic sinusitis     Diabetes mellitus (HCC)     Disease of thyroid gland     Enlarged prostate     Fatty liver      Headache     Hematuria     Hypertension     Joint pain     Kidney stone     Legally blind in right eye, as defined in [place]     shadows only    Nausea and vomiting     Retention of urine     Streptococcal sore throat     Ureteral calculi     Wheezing      Past Surgical History:   Procedure Laterality Date    COLONOSCOPY  02/2010    by -colon polyps    COLONOSCOPY  12/28/2017    Anupam Shay M.D. / multiple polyps removed, 1.5 cm polyp ascending colon-tubulovillous adenoma, 20 mm polyp descending colon and 2 other smaller polyps all tubular adenomas    COLONOSCOPY  02/20/2019    Lincoln Bautista M.D. / 3 polyps-tubular adenomas, diverticulosis    COLONOSCOPY  11/10/2022    8 polyps tubular adenomas, biopsy of ileocecal valve showed hyperplastic polyp by Dr. Bautista    CYSTOSCOPY  2013    EYE SURGERY  2000    IR BIOPSY BONE MARROW  09/23/2021    KNEE ARTHROSCOPY Bilateral     SC ARTHRP ACETBLR/PROX FEM PROSTC AGRFT/ALGRFT Left 8/21/2023    Procedure: ARTHROPLASTY HIP TOTAL;  Surgeon: Shreyas Longo DO;  Location: [facility];  Service: Orthopedics     Family History   Problem Relation Age of Onset    Breast cancer Mother     Cancer Father         not certain    Colon polyps Sister      Current Outpatient Medications on File Prior to Visit   Medication Sig Dispense Refill    metFORMIN (GLUCOPHAGE-XR) 500 mg 24 hr tablet Take 500 mg by mouth every morning      rosuvastatin (CRESTOR) 20 MG tablet Take 20 mg by mouth daily at bedtime      albuterol (PROVENTIL HFA,VENTOLIN HFA) 90 mcg/act inhaler Inhale 2 puffs every 4 (four) hours as needed for wheezing (or cough) (Patient not taking: Reported on 8/27/2024) 18 g 0    amLODIPine (NORVASC) 10 mg tablet Take 10 mg by mouth daily (Patient not taking: Reported on 8/27/2024)      aspirin (ECOTRIN LOW STRENGTH) 81 mg EC tablet Take 1 tablet (81 mg total) by mouth 2 (two) times a day (Patient not taking: Reported on 11/14/2023) 60 tablet 0    benzonatate  (TESSALON PERLES) 100 mg capsule Take 1 capsule (100 mg total) by mouth 3 (three) times a day as needed for cough (Patient not taking: Reported on 8/27/2024) 20 capsule 0    cholecalciferol (VITAMIN D3) 1,000 units tablet Take 2 tablets (2,000 Units total) by mouth daily (Patient not taking: Reported on 8/27/2024) 60 tablet 1    cloNIDine (CATAPRES) 0.1 mg tablet Take 0.1 mg by mouth every 12 (twelve) hours (Patient not taking: Reported on 8/27/2024)      fluticasone (FLONASE) 50 mcg/act nasal spray 1 spray into each nostril daily (Patient not taking: Reported on 8/27/2024) 16 g 0    hydrochlorothiazide (HYDRODIURIL) 25 mg tablet Take 25 mg by mouth daily (Patient not taking: Reported on 8/27/2024)      losartan (COZAAR) 50 mg tablet Take 50 mg by mouth daily (Patient not taking: Reported on 8/27/2024)      NP Thyroid 60 MG tablet TAKE 1 TABLET BY MOUTH ONCE DAILY IN THE MORNING FIRST THING IN IN THE MORNING WITH A GLASS OF WATER-NOTHING TO EAT OR DRINK FOR 30 MINUTES AFTERWARDS (Patient not taking: Reported on 9/27/2024)      [DISCONTINUED] predniSONE 20 mg tablet Take 1 tablet (20 mg total) by mouth 2 (two) times a day (Patient not taking: Reported on 8/27/2024) 10 tablet 0    [DISCONTINUED] senna-docusate sodium (SENOKOT S) 8.6-50 mg per tablet Take 1 tablet by mouth daily (Patient not taking: Reported on 8/27/2024) 30 tablet 0     No current facility-administered medications on file prior to visit.     Allergies   Allergen Reactions    Lisinopril Cough          Objective     There were no vitals taken for this visit.    Physical Exam  Vitals and nursing note reviewed.   Constitutional:       Appearance: Normal appearance. He is obese.   HENT:      Head: Normocephalic and atraumatic.   Neck:      Vascular: No carotid bruit.   Cardiovascular:      Rate and Rhythm: Normal rate and regular rhythm.      Pulses: Normal pulses.           Femoral pulses are 2+ on the right side and 2+ on the left side.       Popliteal  pulses are 2+ on the right side and 2+ on the left side.        Dorsalis pedis pulses are 2+ on the right side and 2+ on the left side.        Posterior tibial pulses are 2+ on the right side and 2+ on the left side.      Heart sounds: Normal heart sounds.   Pulmonary:      Effort: Pulmonary effort is normal.      Breath sounds: Normal breath sounds.   Abdominal:      General: There is no distension.      Palpations: Abdomen is soft. There is no mass.      Tenderness: There is no abdominal tenderness.      Hernia: No hernia is present.   Musculoskeletal:      Cervical back: Normal range of motion. No tenderness.      Right lower leg: No edema.      Left lower leg: No edema.   Lymphadenopathy:      Cervical: No cervical adenopathy.   Skin:     Capillary Refill: Capillary refill takes less than 2 seconds.   Neurological:      General: No focal deficit present.      Mental Status: He is alert and oriented to person, place, and time.   Psychiatric:         Mood and Affect: Mood normal.         Behavior: Behavior normal.

## 2024-09-27 NOTE — PATIENT INSTRUCTIONS
3.5 cm infrarenal AAA.  We discussed different appraoches  He is interested in participating in clinical trial [facility]  I will have him go over screening next week.     I have given him literature to read.     Avoid heavy lifting over 30lbs  Daily walking     Take your aspirin daily  Take daily rosuvasatin

## 2024-10-02 ENCOUNTER — DOCUMENTATION (OUTPATIENT)
Dept: OTHER | Facility: HOSPITAL | Age: 69
End: 2024-10-02

## 2024-10-02 DIAGNOSIS — I71.43 INFRARENAL ABDOMINAL AORTIC ANEURYSM (AAA) WITHOUT RUPTURE (HCC): Primary | Chronic | ICD-10-CM

## 2024-10-02 NOTE — PROGRESS NOTES
Encounter department: [facility] [place]     Assessment & Plan  Infrarenal abdominal aortic aneurysm (AAA) without rupture (HCC)  3.5 cm infrarenal AAA.  We discussed different appraoches  He is interested in participating in clinical trial [facility]      Informed consent signed today after discussing all risks and benefits of procedure.  Expalined that this is a randomized trial so he could be either in control or treatment arm.  Part of procedure would be billed to his insurance company, device and equipment would be paid by the company.  So he would be responsible for copay for insurance.     Avoid heavy lifting over 30lbs  Daily walking     Take your aspirin daily  Take daily rosuvasatin           Former smoker  Quit several years ago     Class 1 obesity due to excess calories without serious comorbidity with body mass index (BMI) of 32.0 to 32.9 in adult  Daily walking and diet control has been recommended.           History of Present Illness     Felice Jose is a 69 y.o. male who presents Patient for evaluation of abdominal aortic aneurysm.  Denies any back pain or abdominal pain.  Was diagnosed about 2 years ago when he had a CAT scan for some other reason.     Patient presents to review US abd/ AOIL for known AAA.      History obtained from : patient  Review of Systems   Constitutional: Negative.    HENT: Negative.     Eyes: Negative.    Respiratory: Negative.     Cardiovascular: Negative.    Gastrointestinal: Negative.    Endocrine: Negative.    Genitourinary: Negative.    Musculoskeletal: Negative.    Skin: Negative.    Allergic/Immunologic: Negative.    Neurological: Negative.    Hematological: Negative.    Psychiatric/Behavioral: Negative.              Past Medical History  Medical History        Past Medical History:   Diagnosis Date    Abdominal pain      Acute infection of external ear      BPH with obstruction/lower urinary tract symptoms      BPH without urinary obstruction 11/2013     Chills (without fever)      Chronic sinusitis      Diabetes mellitus (HCC)      Disease of thyroid gland      Enlarged prostate      Fatty liver      Headache      Hematuria      Hypertension      Joint pain      Kidney stone      Legally blind in right eye, as defined in [place]       shadows only    Nausea and vomiting      Retention of urine      Streptococcal sore throat      Ureteral calculi      Wheezing           Surgical History         Past Surgical History:   Procedure Laterality Date    COLONOSCOPY   02/2010     by -colon polyps    COLONOSCOPY   12/28/2017     Anupam Shay M.D. / multiple polyps removed, 1.5 cm polyp ascending colon-tubulovillous adenoma, 20 mm polyp descending colon and 2 other smaller polyps all tubular adenomas    COLONOSCOPY   02/20/2019     Lincoln Bautista M.D. / 3 polyps-tubular adenomas, diverticulosis    COLONOSCOPY   11/10/2022     8 polyps tubular adenomas, biopsy of ileocecal valve showed hyperplastic polyp by Dr. Bautista    CYSTOSCOPY   2013    EYE SURGERY   2000    IR BIOPSY BONE MARROW   09/23/2021    KNEE ARTHROSCOPY Bilateral      OK ARTHRP ACETBLR/PROX FEM PROSTC AGRFT/ALGRFT Left 8/21/2023     Procedure: ARTHROPLASTY HIP TOTAL;  Surgeon: Shreyas Longo DO;  Location: [facility];  Service: Orthopedics         Family History         Family History   Problem Relation Age of Onset    Breast cancer Mother      Cancer Father           not certain    Colon polyps Sister           Medications Ordered Prior to Encounter[]Expand by Default          Current Outpatient Medications on File Prior to Visit   Medication Sig Dispense Refill    metFORMIN (GLUCOPHAGE-XR) 500 mg 24 hr tablet Take 500 mg by mouth every morning        rosuvastatin (CRESTOR) 20 MG tablet Take 20 mg by mouth daily at bedtime        albuterol (PROVENTIL HFA,VENTOLIN HFA) 90 mcg/act inhaler Inhale 2 puffs every 4 (four) hours as needed for wheezing (or cough) (Patient not taking: Reported on  8/27/2024) 18 g 0    amLODIPine (NORVASC) 10 mg tablet Take 10 mg by mouth daily (Patient not taking: Reported on 8/27/2024)        aspirin (ECOTRIN LOW STRENGTH) 81 mg EC tablet Take 1 tablet (81 mg total) by mouth 2 (two) times a day (Patient not taking: Reported on 11/14/2023) 60 tablet 0    benzonatate (TESSALON PERLES) 100 mg capsule Take 1 capsule (100 mg total) by mouth 3 (three) times a day as needed for cough (Patient not taking: Reported on 8/27/2024) 20 capsule 0    cholecalciferol (VITAMIN D3) 1,000 units tablet Take 2 tablets (2,000 Units total) by mouth daily (Patient not taking: Reported on 8/27/2024) 60 tablet 1    cloNIDine (CATAPRES) 0.1 mg tablet Take 0.1 mg by mouth every 12 (twelve) hours (Patient not taking: Reported on 8/27/2024)        fluticasone (FLONASE) 50 mcg/act nasal spray 1 spray into each nostril daily (Patient not taking: Reported on 8/27/2024) 16 g 0    hydrochlorothiazide (HYDRODIURIL) 25 mg tablet Take 25 mg by mouth daily (Patient not taking: Reported on 8/27/2024)        losartan (COZAAR) 50 mg tablet Take 50 mg by mouth daily (Patient not taking: Reported on 8/27/2024)        NP Thyroid 60 MG tablet TAKE 1 TABLET BY MOUTH ONCE DAILY IN THE MORNING FIRST THING IN IN THE MORNING WITH A GLASS OF WATER-NOTHING TO EAT OR DRINK FOR 30 MINUTES AFTERWARDS (Patient not taking: Reported on 9/27/2024)        [DISCONTINUED] predniSONE 20 mg tablet Take 1 tablet (20 mg total) by mouth 2 (two) times a day (Patient not taking: Reported on 8/27/2024) 10 tablet 0    [DISCONTINUED] senna-docusate sodium (SENOKOT S) 8.6-50 mg per tablet Take 1 tablet by mouth daily (Patient not taking: Reported on 8/27/2024) 30 tablet 0      No current facility-administered medications on file prior to visit.         Allergies        Allergies   Allergen Reactions    Lisinopril Cough             /80  HR 59  SpO2 96%          Objective   Physical Exam  Vitals and nursing note reviewed.   Constitutional:        Appearance: Normal appearance. He is obese.   HENT:      Head: Normocephalic and atraumatic.   Neck:      Vascular: No carotid bruit.   Cardiovascular:      Rate and Rhythm: Normal rate and regular rhythm.      Pulses: Normal pulses.           Femoral pulses are 2+ on the right side and 2+ on the left side.       Popliteal pulses are 2+ on the right side and 2+ on the left side.        Dorsalis pedis pulses are 2+ on the right side and 2+ on the left side.        Posterior tibial pulses are 2+ on the right side and 2+ on the left side.      Heart sounds: Normal heart sounds.   Pulmonary:      Effort: Pulmonary effort is normal.      Breath sounds: Normal breath sounds.   Abdominal:      General: There is no distension.      Palpations: Abdomen is soft. There is no mass.      Tenderness: There is no abdominal tenderness.      Hernia: No hernia is present.   Musculoskeletal:      Cervical back: Normal range of motion. No tenderness.      Right lower leg: No edema.      Left lower leg: No edema.   Lymphadenopathy:      Cervical: No cervical adenopathy.   Skin:     Capillary Refill: Capillary refill takes less than 2 seconds.   Neurological:      General: No focal deficit present.      Mental Status: He is alert and oriented to person, place, and time.   Psychiatric:         Mood and Affect: Mood normal.         Behavior: Behavior normal.

## 2024-10-02 NOTE — PROGRESS NOTES
Documentation of Informed Consent Process for Clinical Research Study      Study title:   PAM LEONG    Patient Name:  Felice Jose                                   YOB: 1955     This signed and dated document shall serve as certification that all of the below listed required elements of informed consent were provided to the subject or legally authorized representative signing the actual Informed Consent Document, both in written and verbal format.     The HIPAA consent is contained within the Informed Consent document, and has also been discussed.    A copy of the actual signed and dated Informed Consent has also been provided to the subject or legally authorized representative, and the original signed document shall be maintained in the research shadow chart.          Element of Informed Consent Discussed Date Initials/ Person obtaining consent   A statement that the study involves research, an explanation of the purposes of the research and the expected duration of the subject's participation, a description of the procedures to be followed, and identification of any procedures which are experimental 10/02/24   Anel Bundy    A description of any reasonably foreseeable risks or discomforts to the subject. 10/02/24   Anel Bundy    A description of any benefits to the subject or to others which may reasonably be expected from the research. 10/02/24   Anel Bundy    A disclosure of appropriate alternative procedures or courses of treatment, if any, that might be advantageous to the subject. 10/02/24   Anel Bundy    A statement describing the extent, if any, to which confidentiality of records identifying the subject will be maintained and that notes the possibility that the Food and Drug Administration may inspect the records 10/02/24   Anel Bundy    For research involving more than minimal risk, an explanation as to whether any compensation and an  explanation as to whether any medical treatments are available if injury occurs and, if so, what they consist of, or where further information may be obtained. 10/02/24   Anel Bundy    An explanation of whom to contact for answers to pertinent questions about the research and research subjects' rights, and whom to contact in the event of a research-related injury to the subject. 10/02/24   Anel Bundy    A statement that participation is voluntary, that refusal to participate will involve no penalty or loss of benefits to which the subject is otherwise entitled, and that the subject may discontinue participation at any time without penalty or loss of benefits to which the subject is otherwise entitled. 10/02/24   Anel Bundy    A statement that the particular treatment or procedure may involve risks to the subject (or to the embryo or fetus, if the subject is or may become pregnant) which are currently unforeseeable 10/02/24   Anel Bundy    Anticipated circumstances under which the subject's participation may be terminated by the investigator without regard to the subject's consent. 10/02/24   Anel Bundy    Any additional costs to the subject that may result from participation in the research 10/02/24   Anel Bundy    The consequences of a subjects' decision to withdraw from the research and procedures for orderly termination of participation by the subject. 10/02/24   Anel Bundy    A statement that significant new findings developed during the course of the research which may relate to the subject's willingness to continue participation will be provided to the subject. 10/02/24   Anel Bundy    The approximate number of subjects involved in the study. 10/02/24   Anel Bundy      The patient speaks, reads and understands English?   [x] Yes  []No     If NO, Name of :___________________________________      speaks,  reads, and understands English?  []Yes  []No  [x]N/A     Process utilized to obtain consent:_______________________________________________   _______________________________________________    Subject meets eligibility criteria as outline in the current protocol? [x]Yes  []No      [] N/A - Reason: ___________________________________________________________    The protocol consent was reviewed with the patient and all questions were addressed and answered?  [x]Yes  []No       The subject agreed to participate and the consent was signed and dated?  [x]Yes  []No        Consent was obtained prior to any research procedures being performed?  [x]Yes  []No           Date & Time ICF signed:  10/02/24   /1200        Certification of Person Conducting the Consent Process:                                                                                Anel Bundy    10/02/24    Printed Name    Date

## 2024-10-16 ENCOUNTER — HOSPITAL ENCOUNTER (OUTPATIENT)
Dept: CT IMAGING | Facility: HOSPITAL | Age: 69
Discharge: HOME | End: 2024-10-16
Attending: SURGERY
Payer: COMMERCIAL

## 2024-10-16 DIAGNOSIS — I71.43 INFRARENAL ABDOMINAL AORTIC ANEURYSM (AAA) WITHOUT RUPTURE (HCC): Chronic | ICD-10-CM

## 2024-10-16 PROCEDURE — 74174 CTA ABD&PLVS W/CONTRAST: CPT

## 2024-10-16 RX ADMIN — IOHEXOL 75 ML: 350 INJECTION, SOLUTION INTRAVENOUS at 09:29

## 2024-10-16 NOTE — LETTER
[facility]  [address] [address]      October 23, 2024    MRN: [id number]     Phone: [phone]     Dear  Rebeca,    You recently had a(n) Cat Scan performed on 10/16/2024 at  [facility] that was requested by Zarina Yousif MD. The study was reviewed by a radiologist, which is a physician who specializes in medical imaging. The radiologist issued a report describing his or her findings. In that report there was a finding that the radiologist felt warranted further discussion with your health care provider and that discussion would be beneficial to you.      The results were sent to Zarina Yousif MD on 10/18/2024 10:08 AM. We recommend that you contact Zarina Yousif MD at [phone] or set up an appointment to discuss the results of the imaging test. If you have already heard from Zarina Yousif MD regarding the results of your study, you can disregard this letter.     This letter is not meant to alarm you, but intended to encourage you to follow-up on your results with the provider that sent you for the imaging study. In addition, we have enclosed answers to frequently asked questions by other patients who have also received a letter to review results with their health care provider (see page two).      Thank you for choosing [facility] for your medical imaging needs.                                                                                                                                                        FREQUENTLY ASKED QUESTIONS    Why am I receiving this letter?  [place] State Law requires us to notify patients who have findings on imaging exams that may require more testing or follow-up with a health professional within the next 3 months.        How serious is the finding on the imaging test?  This letter is sent to all patients who may need follow-up or more testing  within the next 3 months.  Receiving this letter does not necessarily mean you have a life-threatening imaging finding or disease.  Recommendations in the radiologist’s imaging report are general in nature and it is up to your healthcare provider to say whether those recommendations make sense for your situation.  You are strongly encouraged to talk to your health care provider about the results and ask whether additional steps need to be taken.    Where can I get a copy of the final report for my recent radiology exam?  To get a full copy of the report you can access your records online at [email or web address] or please contact [facility] Medical Records Department at [phone] Monday through Friday between 8 am and 6 pm.         What do I need to do now?           Please contact your health care provider who requested the imaging study to discuss what further actions (if any) are needed.  You may have already heard from (your ordering provider) in regard to this test in which case you can disregard this letter.        NOTICE IN ACCORDANCE WITH THE [place] STATE “PATIENT TEST RESULT INFORMATION ACT OF 2018”    You are receiving this notice as a result of a determination by your diagnostic imaging service that further discussions of your test results are warranted and would be beneficial to you.    The complete results of your test or tests have been or will be sent to the health care practitioner that ordered the test or tests. It is recommended that you contact your health care practitioner to discuss your results as soon as possible.

## 2024-10-18 ENCOUNTER — TELEPHONE (OUTPATIENT)
Age: 69
End: 2024-10-18

## 2024-10-18 NOTE — TELEPHONE ENCOUNTER
Scan incidentally noted abnormality of gallbladder. Radiology recommended a gallbladder u/s. I cannot find patient's PCP in [facility]. I think it may be [facility]. Please notify the patient there gallbladder appear abnormal and should have a dedicated gallbladder u/c. This can be ordered by PCP. Please also fax the report to PCP and notify PCP office of findings.

## 2024-10-18 NOTE — RESULT ENCOUNTER NOTE
Patient will need ultrasound of gallbladder and there is a possible abnormality in gallbladder and ultrasound is better at looking at it.

## 2024-10-18 NOTE — TELEPHONE ENCOUNTER
Attempted to contact patient and left message to return call.    ALSO, REPORT WILL NEED TO BE FAXED TO PATIENT'S PCP AND A CALL TO HIS PCP TO INFORM THEM OF RESULTS.

## 2024-10-18 NOTE — TELEPHONE ENCOUNTER
Received call from Erin at [facility] radiology to report significant findings on pt's CTA ABD/Pelvis from 10/16.  Pt of Dr. Yousif

## 2024-10-21 ENCOUNTER — TELEPHONE (OUTPATIENT)
Dept: VASCULAR SURGERY | Facility: HOSPITAL | Age: 69
End: 2024-10-21

## 2024-10-21 NOTE — TELEPHONE ENCOUNTER
----- Message from Zarina Yousif MD sent at 10/18/2024  5:15 PM EDT -----  Patient will need ultrasound of gallbladder and there is a possible abnormality in gallbladder and ultrasound is better at looking at it.

## 2024-10-21 NOTE — TELEPHONE ENCOUNTER
Lvm to help pt schedule u/s please connect him w/ central scheduling when he calls back to schedule

## 2024-10-22 ENCOUNTER — TELEPHONE (OUTPATIENT)
Dept: VASCULAR SURGERY | Facility: CLINIC | Age: 69
End: 2024-10-22

## 2024-10-22 ENCOUNTER — TELEPHONE (OUTPATIENT)
Dept: OTHER | Facility: HOSPITAL | Age: 69
End: 2024-10-22

## 2024-10-22 ENCOUNTER — DOCUMENTATION (OUTPATIENT)
Dept: OTHER | Facility: HOSPITAL | Age: 69
End: 2024-10-22

## 2024-10-22 DIAGNOSIS — I71.43 INFRARENAL ABDOMINAL AORTIC ANEURYSM (AAA) WITHOUT RUPTURE (HCC): Primary | Chronic | ICD-10-CM

## 2024-10-22 NOTE — PROGRESS NOTES
[facility] Clinical Trial Screening Visit  PI: Dr. Yousif  Name: Felice Jose : 1955  Date of visit: 10/2/24  Informed consent obtained: yes  Eligibility Status reviewed with study physician and : yes  Were all eligibility criteria met: Yes, pending CT and lab results  Scheduled procedure date: tentative  CT scan ordered (to be completed within 30 days of procedure date): Yes, scheduled for 10/16/2024  Pre-procedure labs ordered (CBC, CMP, Troponin): If no, explain: Dr. Yousif awaiting CT results    Demographics  : [birth year]  Age: 69  Sex at birth: Male  Ethnicity: Not  or   Race: White    Medical History  Verified no allergy to contrast media  Date of AAA diagnosis: 2023  Smoking history including vaping: Never  Alcohol use: 8 drinks per week  No history cancer, organ transplants, or blood transfusions  No angina, artificial heart valves, pacemaker, ACS, MI, heart disease, murmur, or heart failure  No Abnormal blood pressure, stroke, peripheral arterial disease  History systemic hypertension: ongoing  No history kidney disease  Type II diabetic: ongoing  Hyperlipidemia: ongoing    Vitals  Date: 10/02/2024  Time: 12:00  Temperature: 98.0 degrees F  HR: 72bpm  BP: 124/78    Concomitant Medications  Albuterol inhaler   Amlodipine  Aspirin  Cholecalciferol  Clonidine  Flonase  Hydrochlorothiazide  Losartan  Metformin  Rosuvastatin    Physical Exam: Completed by Dr. Yousif. Please refer to physician office note.  Body System: normal  Cardiovascular: normal  Extremities: normal  General Appearance: normal  Head, Eyes, Ears, Nose, Throat: normal  Lungs: normal  Lymph Nodes: normal  Neck: normal  Skin: normal  Back: normal    CT to be completed and evaluated by [facility] core lab for eligibility verification. Once confirmed, patient to have labs obtained and randomization. Patient aware to call nurse of PI with any new/worsening symptoms, illnesses, or questions that may  occur.

## 2024-10-22 NOTE — TELEPHONE ENCOUNTER
----- Message from Zarina Yousif MD sent at 10/22/2024 10:46 AM EDT -----  Patient needs to be booked for NECTERO EAST TREATMENT procedure as part of clinical trial.  Please book him as first case on Nov 8.  Consent to be signed on day of surgery      Operative Scheduling Information:    Hospital:  [facility]    Physician:  Benja    Surgery: NECTERO EAST Aortic balloon angioplasty    Urgency:  Urgent: 3 weeks    Level:  Level 3: Outpatients to be scheduled for elective surgery than can be delayed up to 4 weeks without reasonable expectation of detriment to patient    Case Length:  1 hours    Post-op Bed:  Outpatient    OR Table:  [facility]    Equipment Needs:  Rep: nectero EAST systme    Medication Instructions:  Aspirin:   Continue (do not hold)    Hydration:  No    Contrast Allergy:  no

## 2024-10-22 NOTE — PROGRESS NOTES
[facility] Clinical Trial: Randomization    Felice Jose,  1955 was randomized and assigned to Treatment Group.    Patient contacted on 10/22/24     CT completed within 30 days of scheduled date if randomized to treatment group: Yes, 10/16/2024    CBC, CMP, Troponin completed: No, orders obtained, patient to have labs completed in near future.    Patient aware to call coordinator of PI with any new/worsening symptoms, illnesses, or questions.

## 2024-10-25 ENCOUNTER — TELEPHONE (OUTPATIENT)
Dept: OTHER | Facility: HOSPITAL | Age: 69
End: 2024-10-25

## 2024-10-25 ENCOUNTER — PREP FOR PROCEDURE (OUTPATIENT)
Dept: VASCULAR SURGERY | Facility: CLINIC | Age: 69
End: 2024-10-25

## 2024-10-25 ENCOUNTER — TELEPHONE (OUTPATIENT)
Dept: CARDIOLOGY CLINIC | Facility: CLINIC | Age: 69
End: 2024-10-25

## 2024-10-25 NOTE — TELEPHONE ENCOUNTER
Caller: Felice     Doctor: Dr. Crockett    Reason for call: pt was on the phone with someone and got disconnected he says they were giving him an appt in NOV with Dr. Crockett because Dr. Crockett wants to se patient after his results came back, which I don't see any availability till FEB 2025, that lead me to believe someone from the office was speaking to him. I informed pt we would give him a call back to schedule something in NOV.     Call back#: [phone]

## 2024-10-25 NOTE — TELEPHONE ENCOUNTER
Verified patient's insurance   CONFIRMED - Patient's insurance is [employer]   CLINICAL TRIAL PATIENT  Is patient requesting a call when authorization has been obtained? Patient did not request a call.    Surgery Date: 11/8/24  Primary Surgeon: PAXTON // Zarina Yousif (NPI: [id number])  Assisting Surgeon: Not Applicable (N/A)  Facility: [facility] (Tax: [id number] / NPI: [id number])  Inpatient / Outpatient: Outpatient  Level: 2    Clearance Received: No clearance ordered.  Consent Received: Consent will be signed day of procedure.  Medication Hold / Last Dose:  No hold ASA  IR Notified: Not Applicable (N/A)  Rep. Notified:  TRELL Weber notified 10/25/24  Equipment Needs:  Rent The Dress  Vas Lab Requested: Not Applicable (N/A)  Patient Contacted: 10/25/24    Diagnosis: I717.43  Procedure/ CPT Code(s): Angiogram // CPT: 74414, 17718, and 92478 // Procedure to take place in OR [Auth/ Cert Based]    For varicose vein related procedures:   Last LEVDR: Not Applicable (N/A)  CEAP Classification: Not Applicable (N/A)  VCSS: Not Applicable (N/A)    Post Operative Date/ Time: TBD - Message sent to Anel Bundy- Clinical Trial RN      *Please review medication hold(s), PATs, and check H&P with patient.*  PATIENT WAS MAILED SURGERY/SHOWERING/DISCHARGE/COVID INSTRUCTIONS AFTER REVIEWING WITH THEM VIA PHONE CALL.

## 2024-10-25 NOTE — TELEPHONE ENCOUNTER
Discussed StADre trial treatment to be completed 11/8. Requested patient obtain required labs if possible today to ensure clearance for procedure. Vascular to call patient with further details. Informed patient I will be OOO until 11/5 and to call vascular office with any questions he may have while I am out.

## 2024-10-25 NOTE — TELEPHONE ENCOUNTER
Spoke to patient for a few seconds in regards to scheduling procedure and patient had hung up the phone call. Msg sent to Dr. Yousif and YEHUDA Tam who has been working on this case as well.

## 2024-10-28 ENCOUNTER — APPOINTMENT (OUTPATIENT)
Dept: LAB | Facility: IMAGING CENTER | Age: 69
End: 2024-10-28
Payer: COMMERCIAL

## 2024-10-28 ENCOUNTER — TELEPHONE (OUTPATIENT)
Dept: VASCULAR SURGERY | Facility: CLINIC | Age: 69
End: 2024-10-28

## 2024-10-28 DIAGNOSIS — K76.0 FATTY LIVER: ICD-10-CM

## 2024-10-28 DIAGNOSIS — I71.43 INFRARENAL ABDOMINAL AORTIC ANEURYSM (AAA) WITHOUT RUPTURE (HCC): Chronic | ICD-10-CM

## 2024-10-28 DIAGNOSIS — R79.89 ELEVATED LFTS: ICD-10-CM

## 2024-10-28 LAB
ALBUMIN SERPL BCG-MCNC: 3.8 G/DL (ref 3.5–5)
ALP SERPL-CCNC: 50 U/L (ref 34–104)
ALT SERPL W P-5'-P-CCNC: 43 U/L (ref 7–52)
ANION GAP SERPL CALCULATED.3IONS-SCNC: 10 MMOL/L (ref 4–13)
AST SERPL W P-5'-P-CCNC: 27 U/L (ref 13–39)
BASOPHILS # BLD AUTO: 0.02 THOUSANDS/ÂΜL (ref 0–0.1)
BASOPHILS NFR BLD AUTO: 0 % (ref 0–1)
BILIRUB DIRECT SERPL-MCNC: 0.14 MG/DL (ref 0–0.2)
BILIRUB SERPL-MCNC: 0.79 MG/DL (ref 0.2–1)
BUN SERPL-MCNC: 12 MG/DL (ref 5–25)
CALCIUM SERPL-MCNC: 8.9 MG/DL (ref 8.4–10.2)
CARDIAC TROPONIN I PNL SERPL HS: 5 NG/L (ref 8–18)
CHLORIDE SERPL-SCNC: 103 MMOL/L (ref 96–108)
CO2 SERPL-SCNC: 27 MMOL/L (ref 21–32)
CREAT SERPL-MCNC: 0.92 MG/DL (ref 0.6–1.3)
EOSINOPHIL # BLD AUTO: 0.13 THOUSAND/ÂΜL (ref 0–0.61)
EOSINOPHIL NFR BLD AUTO: 2 % (ref 0–6)
ERYTHROCYTE [DISTWIDTH] IN BLOOD BY AUTOMATED COUNT: 11.9 % (ref 11.6–15.1)
GFR SERPL CREATININE-BSD FRML MDRD: 85 ML/MIN/1.73SQ M
GLUCOSE P FAST SERPL-MCNC: 177 MG/DL (ref 65–99)
HCT VFR BLD AUTO: 47.2 % (ref 36.5–49.3)
HGB BLD-MCNC: 15.8 G/DL (ref 12–17)
IMM GRANULOCYTES # BLD AUTO: 0.02 THOUSAND/UL (ref 0–0.2)
IMM GRANULOCYTES NFR BLD AUTO: 0 % (ref 0–2)
LYMPHOCYTES # BLD AUTO: 2.12 THOUSANDS/ÂΜL (ref 0.6–4.47)
LYMPHOCYTES NFR BLD AUTO: 37 % (ref 14–44)
MCH RBC QN AUTO: 33.4 PG (ref 26.8–34.3)
MCHC RBC AUTO-ENTMCNC: 33.5 G/DL (ref 31.4–37.4)
MCV RBC AUTO: 100 FL (ref 82–98)
MONOCYTES # BLD AUTO: 0.46 THOUSAND/ÂΜL (ref 0.17–1.22)
MONOCYTES NFR BLD AUTO: 8 % (ref 4–12)
NEUTROPHILS # BLD AUTO: 3.04 THOUSANDS/ÂΜL (ref 1.85–7.62)
NEUTS SEG NFR BLD AUTO: 53 % (ref 43–75)
NRBC BLD AUTO-RTO: 0 /100 WBCS
PLATELET # BLD AUTO: 193 THOUSANDS/UL (ref 149–390)
PMV BLD AUTO: 11.8 FL (ref 8.9–12.7)
POTASSIUM SERPL-SCNC: 4.4 MMOL/L (ref 3.5–5.3)
PROT SERPL-MCNC: 6.9 G/DL (ref 6.4–8.4)
RBC # BLD AUTO: 4.73 MILLION/UL (ref 3.88–5.62)
SODIUM SERPL-SCNC: 140 MMOL/L (ref 135–147)
WBC # BLD AUTO: 5.79 THOUSAND/UL (ref 4.31–10.16)

## 2024-10-28 PROCEDURE — 36415 COLL VENOUS BLD VENIPUNCTURE: CPT

## 2024-10-28 PROCEDURE — 84484 ASSAY OF TROPONIN QUANT: CPT

## 2024-10-28 PROCEDURE — 80053 COMPREHEN METABOLIC PANEL: CPT

## 2024-10-28 PROCEDURE — 82248 BILIRUBIN DIRECT: CPT

## 2024-10-28 PROCEDURE — 85025 COMPLETE CBC W/AUTO DIFF WBC: CPT

## 2024-10-28 NOTE — TELEPHONE ENCOUNTER
----- Message from Zarina Yousif MD sent at 10/28/2024  1:58 PM EDT -----  Regarding: RE: TRELL  Yes please try to have him come see me on Thursday at 10/30 at 10.30 am if he has any additional questions about procedure. Otherwise I will see him on date of procedure  ----- Message -----  From: Alexa Thorpe  Sent: 10/25/2024  12:05 PM EDT  To: Zarina Yousif MD; Alexa Thorpe; #  Subject: RE: TRELL                                      Hi Again Dr. Yousif,     Patient is all set for 11/8/24 - do you need to see him for a follow up visit?    Thank you.  ----- Message -----  From: Alexa Thorpe  Sent: 10/25/2024  10:44 AM EDT  To: Zarina Yousif MD; #  Subject: TRELL                                          Good Morning Dr. Yousif,     I just wanted to let you know that I have made a few phone calls to Felice in regards to scheduling and left voicemails, I got to speak to [email or web address] this morning but he hung up when I started talking about scheduling him for the procedure. I did let Anel know as well to see if she was able to give him a call/speak to him about scheduling and give us a call.     Thank you,

## 2024-10-28 NOTE — TELEPHONE ENCOUNTER
[place] for patient to call back and let us know if he would like to see Dr. Yousif prior to procedure

## 2024-10-29 NOTE — TELEPHONE ENCOUNTER
Spoke to patient on 10/29/24 and confirmed he will be coming in on 10/31/24 at 10:30am with Dr. Yousif.

## 2024-10-31 ENCOUNTER — OFFICE VISIT (OUTPATIENT)
Dept: VASCULAR SURGERY | Facility: CLINIC | Age: 69
End: 2024-10-31

## 2024-10-31 ENCOUNTER — ANESTHESIA EVENT (OUTPATIENT)
Dept: PERIOP | Facility: HOSPITAL | Age: 69
End: 2024-10-31
Payer: COMMERCIAL

## 2024-10-31 VITALS
OXYGEN SATURATION: 97 % | RESPIRATION RATE: 18 BRPM | HEIGHT: 70 IN | HEART RATE: 67 BPM | WEIGHT: 238 LBS | BODY MASS INDEX: 34.07 KG/M2 | SYSTOLIC BLOOD PRESSURE: 134 MMHG | DIASTOLIC BLOOD PRESSURE: 88 MMHG

## 2024-10-31 DIAGNOSIS — I10 PRIMARY HYPERTENSION: ICD-10-CM

## 2024-10-31 DIAGNOSIS — R79.89 ELEVATED LFTS: ICD-10-CM

## 2024-10-31 DIAGNOSIS — I71.43 INFRARENAL ABDOMINAL AORTIC ANEURYSM (AAA) WITHOUT RUPTURE (HCC): Primary | Chronic | ICD-10-CM

## 2024-10-31 PROBLEM — D62 ACUTE BLOOD LOSS ANEMIA: Status: RESOLVED | Noted: 2023-08-22 | Resolved: 2024-10-31

## 2024-10-31 PROCEDURE — RECHECK: Performed by: SURGERY

## 2024-10-31 NOTE — ASSESSMENT & PLAN NOTE
He has been randomized in to the treatment arm.  We discussed risks and benefits of procedure in detail. Video of animation of procedure shown.  All questions answered in detail.  Risks of bleeding, bruising at puncture site. Risk of temporary increase in liver function test were discussed.  His anatomy is suitable for consideration of procedure.        Operative Scheduling Information:    Hospital:  [facility]    Physician:  Benja    Surgery: Aortic angioplasty (nectero east)    Urgency:  Standard    Level:  Level 2: Outpatients to be scheduled for surgery with time dependent medical necessity within 2 weeks    Case Length:  1 hours    Post-op Bed:  Outpatient    OR Table:  [facility]    Equipment Needs:  Rep: Nectero East    Medication Instructions:  Aspirin:   Continue (do not hold)    Hydration:  No    Contrast Allergy:  no      Orders:    Type and screen; Future

## 2024-10-31 NOTE — ASSESSMENT & PLAN NOTE
Due to fatty liver, it has now resolved as he is walking 3 miles per day  Continue daily rosuvastatin.

## 2024-11-03 RX ORDER — CHLORHEXIDINE GLUCONATE ORAL RINSE 1.2 MG/ML
15 SOLUTION DENTAL ONCE
Status: CANCELLED | OUTPATIENT
Start: 2024-11-03 | End: 2024-11-03

## 2024-11-03 RX ORDER — CEFAZOLIN SODIUM 2 G/50ML
2000 SOLUTION INTRAVENOUS ONCE
Status: CANCELLED | OUTPATIENT
Start: 2024-11-03 | End: 2024-11-03

## 2024-11-03 NOTE — H&P (VIEW-ONLY)
Ambulatory Visit  Name: Felice Jose      : 1955      MRN: [id number]  Encounter Provider: Zarina Yousif MD  Encounter Date: 10/31/2024   [facility] department: [facility] [place]    Assessment & Plan  Infrarenal abdominal aortic aneurysm (AAA) without rupture (HCC)  He has been randomized in to the treatment arm.  We discussed risks and benefits of procedure in detail. Video of animation of procedure shown.  All questions answered in detail.  Risks of bleeding, bruising at puncture site. Risk of temporary increase in liver function test were discussed.  His anatomy is suitable for consideration of procedure.        Operative Scheduling Information:    Hospital:  [facility]    Physician:  Benja    Surgery: Aortic angioplasty ([facility])    Urgency:  Standard    Level:  Level 2: Outpatients to be scheduled for surgery with time dependent medical necessity within 2 weeks    Case Length:  1 hours    Post-op Bed:  Outpatient    OR Table:  [facility]    Equipment Needs:  Rep: [facility]    Medication Instructions:  Aspirin:   Continue (do not hold)    Hydration:  No    Contrast Allergy:  no      Orders:    Type and screen; Future    Primary hypertension  Improved with daily walking.  Not taking meds at this point.  BP is normal.         Elevated LFTs  Due to fatty liver, it has now resolved as he is walking 3 miles per day  Continue daily rosuvastatin.           History of Present Illness     Felice Jose is a 69 y.o. male who presents for discussing procedure. He has aneurysm for several years and slowly increased in size.    History obtained from : patient  Review of Systems  Past Medical History   Past Medical History:   Diagnosis Date    Abdominal pain     Acute blood loss anemia 2023    Acute infection of external ear     BPH with obstruction/lower urinary tract symptoms     BPH without urinary obstruction 2013    Chills (without fever)     Chronic sinusitis      Diabetes mellitus (HCC)     Disease of thyroid gland     Enlarged prostate     Fatty liver     Headache     Hematuria     Hypertension     Joint pain     Kidney stone     Legally blind in right eye, as defined in [place]     shadows only    Nausea and vomiting     Retention of urine     Streptococcal sore throat     Ureteral calculi     Wheezing      Past Surgical History:   Procedure Laterality Date    COLONOSCOPY  02/2010    by -colon polyps    COLONOSCOPY  12/28/2017    Anupam Shay M.D. / multiple polyps removed, 1.5 cm polyp ascending colon-tubulovillous adenoma, 20 mm polyp descending colon and 2 other smaller polyps all tubular adenomas    COLONOSCOPY  02/20/2019    Lincoln Bautista M.D. / 3 polyps-tubular adenomas, diverticulosis    COLONOSCOPY  11/10/2022    8 polyps tubular adenomas, biopsy of ileocecal valve showed hyperplastic polyp by Dr. Bautista    CYSTOSCOPY  2013    EYE SURGERY  2000    IR BIOPSY BONE MARROW  09/23/2021    KNEE ARTHROSCOPY Bilateral     MD ARTHRP ACETBLR/PROX FEM PROSTC AGRFT/ALGRFT Left 8/21/2023    Procedure: ARTHROPLASTY HIP TOTAL;  Surgeon: Shreyas Longo DO;  Location: [facility];  Service: Orthopedics     Family History   Problem Relation Age of Onset    Breast cancer Mother     Cancer Father         not certain    Colon polyps Sister      Current Outpatient Medications on File Prior to Visit   Medication Sig Dispense Refill    albuterol (PROVENTIL HFA,VENTOLIN HFA) 90 mcg/act inhaler Inhale 2 puffs every 4 (four) hours as needed for wheezing (or cough) (Patient not taking: Reported on 8/27/2024) 18 g 0    amLODIPine (NORVASC) 10 mg tablet Take 10 mg by mouth daily (Patient not taking: Reported on 8/27/2024)      aspirin (ECOTRIN LOW STRENGTH) 81 mg EC tablet Take 1 tablet (81 mg total) by mouth 2 (two) times a day (Patient not taking: Reported on 11/14/2023) 60 tablet 0    benzonatate (TESSALON PERLES) 100 mg capsule Take 1 capsule (100 mg total) by mouth  "3 (three) times a day as needed for cough (Patient not taking: Reported on 8/27/2024) 20 capsule 0    cholecalciferol (VITAMIN D3) 1,000 units tablet Take 2 tablets (2,000 Units total) by mouth daily (Patient not taking: Reported on 8/27/2024) 60 tablet 1    cloNIDine (CATAPRES) 0.1 mg tablet Take 0.1 mg by mouth every 12 (twelve) hours (Patient not taking: Reported on 8/27/2024)      fluticasone (FLONASE) 50 mcg/act nasal spray 1 spray into each nostril daily (Patient not taking: Reported on 8/27/2024) 16 g 0    hydrochlorothiazide (HYDRODIURIL) 25 mg tablet Take 25 mg by mouth daily (Patient not taking: Reported on 8/27/2024)      losartan (COZAAR) 50 mg tablet Take 50 mg by mouth daily (Patient not taking: Reported on 8/27/2024)      metFORMIN (GLUCOPHAGE-XR) 500 mg 24 hr tablet Take 500 mg by mouth every morning      NP Thyroid 60 MG tablet TAKE 1 TABLET BY MOUTH ONCE DAILY IN THE MORNING FIRST THING IN IN THE MORNING WITH A GLASS OF WATER-NOTHING TO EAT OR DRINK FOR 30 MINUTES AFTERWARDS (Patient not taking: Reported on 9/27/2024)      rosuvastatin (CRESTOR) 20 MG tablet Take 20 mg by mouth daily at bedtime       No current facility-administered medications on file prior to visit.     Allergies   Allergen Reactions    Lisinopril Cough          Objective     /88 (BP Location: Left arm, Patient Position: Sitting)   Pulse 67   Resp 18   Ht 5' 10\" (1.778 m)   Wt 108 kg (238 lb)   SpO2 97%   BMI 34.15 kg/m²     Physical Exam  Vitals and nursing note reviewed.   Constitutional:       Appearance: Normal appearance.   HENT:      Head: Normocephalic and atraumatic.   Cardiovascular:      Rate and Rhythm: Normal rate and regular rhythm.      Pulses: Normal pulses.      Heart sounds: Normal heart sounds.   Pulmonary:      Effort: Pulmonary effort is normal.      Breath sounds: Normal breath sounds.   Abdominal:      General: There is no distension.      Palpations: Abdomen is soft. There is no mass.      " Tenderness: There is no abdominal tenderness.      Hernia: No hernia is present.   Musculoskeletal:      Cervical back: Normal range of motion and neck supple.      Right lower leg: No edema.      Left lower leg: No edema.   Lymphadenopathy:      Cervical: No cervical adenopathy.   Skin:     General: Skin is warm and dry.      Capillary Refill: Capillary refill takes less than 2 seconds.   Neurological:      General: No focal deficit present.      Mental Status: He is alert and oriented to person, place, and time.   Psychiatric:         Mood and Affect: Mood normal.         Behavior: Behavior normal.

## 2024-11-03 NOTE — PROGRESS NOTES
Ambulatory Visit  Name: Felice Jose      : 1955      MRN: [id number]  Encounter Provider: Zarina Yousif MD  Encounter Date: 10/31/2024   Enc[facility] department: [facility] [place]    Assessment & Plan  Infrarenal abdominal aortic aneurysm (AAA) without rupture (HCC)  He has been randomized in to the treatment arm.  We discussed risks and benefits of procedure in detail. Video of animation of procedure shown.  All questions answered in detail.  Risks of bleeding, bruising at puncture site. Risk of temporary increase in liver function test were discussed.  His anatomy is suitable for consideration of procedure.        Operative Scheduling Information:    Hospital:  [facility]    Physician:  Benja    Surgery: Aortic angioplasty ([facility])    Urgency:  Standard    Level:  Level 2: Outpatients to be scheduled for surgery with time dependent medical necessity within 2 weeks    Case Length:  1 hours    Post-op Bed:  Outpatient    OR Table:  [employer]    Equipment Needs:  Rep: [facility]    Medication Instructions:  Aspirin:   Continue (do not hold)    Hydration:  No    Contrast Allergy:  no      Orders:    Type and screen; Future    Primary hypertension  Improved with daily walking.  Not taking meds at this point.  BP is normal.         Elevated LFTs  Due to fatty liver, it has now resolved as he is walking 3 miles per day  Continue daily rosuvastatin.           History of Present Illness     Felice Jose is a 69 y.o. male who presents for discussing procedure. He has aneurysm for several years and slowly increased in size.    History obtained from : patient  Review of Systems  Past Medical History   Past Medical History:   Diagnosis Date    Abdominal pain     Acute blood loss anemia 2023    Acute infection of external ear     BPH with obstruction/lower urinary tract symptoms     BPH without urinary obstruction 2013    Chills (without fever)     Chronic sinusitis      Diabetes mellitus (HCC)     Disease of thyroid gland     Enlarged prostate     Fatty liver     Headache     Hematuria     Hypertension     Joint pain     Kidney stone     Legally blind in right eye, as defined in [place]     shadows only    Nausea and vomiting     Retention of urine     Streptococcal sore throat     Ureteral calculi     Wheezing      Past Surgical History:   Procedure Laterality Date    COLONOSCOPY  02/2010    by -colon polyps    COLONOSCOPY  12/28/2017    Anupam Shay M.D. / multiple polyps removed, 1.5 cm polyp ascending colon-tubulovillous adenoma, 20 mm polyp descending colon and 2 other smaller polyps all tubular adenomas    COLONOSCOPY  02/20/2019    Lincoln Bautista M.D. / 3 polyps-tubular adenomas, diverticulosis    COLONOSCOPY  11/10/2022    8 polyps tubular adenomas, biopsy of ileocecal valve showed hyperplastic polyp by Dr. Bautista    CYSTOSCOPY  2013    EYE SURGERY  2000    IR BIOPSY BONE MARROW  09/23/2021    KNEE ARTHROSCOPY Bilateral     [place] ARTHRP ACETBLR/PROX FEM PROSTC AGRFT/ALGRFT Left 8/21/2023    Procedure: ARTHROPLASTY HIP TOTAL;  Surgeon: Shreyas Longo DO;  Location: [facility];  Service: Orthopedics     Family History   Problem Relation Age of Onset    Breast cancer Mother     Cancer Father         not certain    Colon polyps Sister      Current Outpatient Medications on File Prior to Visit   Medication Sig Dispense Refill    albuterol (PROVENTIL HFA,VENTOLIN HFA) 90 mcg/act inhaler Inhale 2 puffs every 4 (four) hours as needed for wheezing (or cough) (Patient not taking: Reported on 8/27/2024) 18 g 0    amLODIPine (NORVASC) 10 mg tablet Take 10 mg by mouth daily (Patient not taking: Reported on 8/27/2024)      aspirin (ECOTRIN LOW STRENGTH) 81 mg EC tablet Take 1 tablet (81 mg total) by mouth 2 (two) times a day (Patient not taking: Reported on 11/14/2023) 60 tablet 0    benzonatate (TESSALON PERLES) 100 mg capsule Take 1 capsule (100 mg total) by mouth  "3 (three) times a day as needed for cough (Patient not taking: Reported on 8/27/2024) 20 capsule 0    cholecalciferol (VITAMIN D3) 1,000 units tablet Take 2 tablets (2,000 Units total) by mouth daily (Patient not taking: Reported on 8/27/2024) 60 tablet 1    cloNIDine (CATAPRES) 0.1 mg tablet Take 0.1 mg by mouth every 12 (twelve) hours (Patient not taking: Reported on 8/27/2024)      fluticasone (FLONASE) 50 mcg/act nasal spray 1 spray into each nostril daily (Patient not taking: Reported on 8/27/2024) 16 g 0    hydrochlorothiazide (HYDRODIURIL) 25 mg tablet Take 25 mg by mouth daily (Patient not taking: Reported on 8/27/2024)      losartan (COZAAR) 50 mg tablet Take 50 mg by mouth daily (Patient not taking: Reported on 8/27/2024)      metFORMIN (GLUCOPHAGE-XR) 500 mg 24 hr tablet Take 500 mg by mouth every morning      NP Thyroid 60 MG tablet TAKE 1 TABLET BY MOUTH ONCE DAILY IN THE MORNING FIRST THING IN IN THE MORNING WITH A GLASS OF WATER-NOTHING TO EAT OR DRINK FOR 30 MINUTES AFTERWARDS (Patient not taking: Reported on 9/27/2024)      rosuvastatin (CRESTOR) 20 MG tablet Take 20 mg by mouth daily at bedtime       No current facility-administered medications on file prior to visit.     Allergies   Allergen Reactions    Lisinopril Cough          Objective     /88 (BP Location: Left arm, Patient Position: Sitting)   Pulse 67   Resp 18   Ht 5' 10\" (1.778 m)   Wt 108 kg (238 lb)   SpO2 97%   BMI 34.15 kg/m²     Physical Exam  Vitals and nursing note reviewed.   Constitutional:       Appearance: Normal appearance.   HENT:      Head: Normocephalic and atraumatic.   Cardiovascular:      Rate and Rhythm: Normal rate and regular rhythm.      Pulses: Normal pulses.      Heart sounds: Normal heart sounds.   Pulmonary:      Effort: Pulmonary effort is normal.      Breath sounds: Normal breath sounds.   Abdominal:      General: There is no distension.      Palpations: Abdomen is soft. There is no mass.      " Tenderness: There is no abdominal tenderness.      Hernia: No hernia is present.   Musculoskeletal:      Cervical back: Normal range of motion and neck supple.      Right lower leg: No edema.      Left lower leg: No edema.   Lymphadenopathy:      Cervical: No cervical adenopathy.   Skin:     General: Skin is warm and dry.      Capillary Refill: Capillary refill takes less than 2 seconds.   Neurological:      General: No focal deficit present.      Mental Status: He is alert and oriented to person, place, and time.   Psychiatric:         Mood and Affect: Mood normal.         Behavior: Behavior normal.

## 2024-11-05 ENCOUNTER — TELEPHONE (OUTPATIENT)
Dept: OTHER | Facility: OTHER | Age: 69
End: 2024-11-05

## 2024-11-05 NOTE — TELEPHONE ENCOUNTER
Patient is calling asking which appointment he has scheduled for today.  Reviewed patient's chart/appointments and informed him that there are no scheduled appointments that RN is seeing for today.  Patient reported that it may be his [facility] appointment instead.   Pt. seen and evaluated for neutropenic fever and pancytopenia.  Pt. is in no distress.   Had fever of 101.4 last night and 100.7 this morning.  Also  decline in hbg to 6.5 and platelet count to 6.  No gross bleeding noted.  Currently no epistaxis.  Physical examination as above.  Will continue IV Cefepime.  Check urine cx and blood cx.   CXR: Linear opacity at the retrocardiac region likely atelectasis versus scarring. Patchy opacity right upper lobe.  Check CT chest.  Will transfuse 1 unit PRBC and platelet.  Continue IVF.  JORDAN improving.  ID and heme/onc f/u. Pt. seen and evaluated for neutropenic fever and pancytopenia 2/2 recent chemotherapy.  Pt. is in no distress.  Tolerating IV antibiotics.  Afebrile over the night.  Physical examination as above.  Will continue IV Cefepime.  Blood cx NGTD.  Urine cx NG.  Continue GCSF and supportive transfusions for anemia and thrombocytopenia as needed.  Hold coumadin given worsening thrombocytopenia. Pt. seen and evaluated for neutropenic fever and pancytopenia.  Pt. is in no distress.  Tolerating GCSF.  Received a total of 2 units PRBC and 1 unit platelets yesterday.  Cell counts all improving.  Physical examination as above.  Will continue Cefepime.  Blood cx NGTD.  Check urine cx.  Will continue filgrastim.  Monitor blood counts.  Supportive transfusions as needed.

## 2024-11-08 ENCOUNTER — APPOINTMENT (OUTPATIENT)
Dept: RADIOLOGY | Facility: HOSPITAL | Age: 69
End: 2024-11-08
Payer: COMMERCIAL

## 2024-11-08 ENCOUNTER — ANESTHESIA (OUTPATIENT)
Dept: PERIOP | Facility: HOSPITAL | Age: 69
End: 2024-11-08
Payer: COMMERCIAL

## 2024-11-08 ENCOUNTER — HOSPITAL ENCOUNTER (INPATIENT)
Facility: HOSPITAL | Age: 69
LOS: 1 days | Discharge: HOME | End: 2024-11-08
Attending: SURGERY | Admitting: SURGERY
Payer: COMMERCIAL

## 2024-11-08 VITALS
BODY MASS INDEX: 32.93 KG/M2 | WEIGHT: 230 LBS | SYSTOLIC BLOOD PRESSURE: 148 MMHG | OXYGEN SATURATION: 96 % | DIASTOLIC BLOOD PRESSURE: 93 MMHG | RESPIRATION RATE: 18 BRPM | HEIGHT: 70 IN | TEMPERATURE: 98.1 F | HEART RATE: 78 BPM

## 2024-11-08 LAB
ABO GROUP BLD: NORMAL
ALBUMIN SERPL BCG-MCNC: 3.6 G/DL (ref 3.5–5)
ALP SERPL-CCNC: 46 U/L (ref 34–104)
ALT SERPL W P-5'-P-CCNC: 42 U/L (ref 7–52)
ANION GAP SERPL CALCULATED.3IONS-SCNC: 6 MMOL/L (ref 4–13)
AST SERPL W P-5'-P-CCNC: 24 U/L (ref 13–39)
BASOPHILS # BLD AUTO: 0.01 THOUSANDS/ÂΜL (ref 0–0.1)
BASOPHILS NFR BLD AUTO: 0 % (ref 0–1)
BILIRUB SERPL-MCNC: 0.43 MG/DL (ref 0.2–1)
BLD GP AB SCN SERPL QL: NEGATIVE
BUN SERPL-MCNC: 14 MG/DL (ref 5–25)
CALCIUM SERPL-MCNC: 7.5 MG/DL (ref 8.4–10.2)
CHLORIDE SERPL-SCNC: 113 MMOL/L (ref 96–108)
CO2 SERPL-SCNC: 21 MMOL/L (ref 21–32)
CREAT SERPL-MCNC: 0.88 MG/DL (ref 0.6–1.3)
EOSINOPHIL # BLD AUTO: 0.09 THOUSAND/ÂΜL (ref 0–0.61)
EOSINOPHIL NFR BLD AUTO: 2 % (ref 0–6)
ERYTHROCYTE [DISTWIDTH] IN BLOOD BY AUTOMATED COUNT: 12.2 % (ref 11.6–15.1)
GFR SERPL CREATININE-BSD FRML MDRD: 88 ML/MIN/1.73SQ M
GLUCOSE P FAST SERPL-MCNC: 225 MG/DL (ref 65–99)
GLUCOSE SERPL-MCNC: 196 MG/DL (ref 65–140)
GLUCOSE SERPL-MCNC: 225 MG/DL (ref 65–140)
GLUCOSE SERPL-MCNC: 228 MG/DL (ref 65–140)
HCT VFR BLD AUTO: 42.3 % (ref 36.5–49.3)
HGB BLD-MCNC: 14 G/DL (ref 12–17)
IMM GRANULOCYTES # BLD AUTO: 0.02 THOUSAND/UL (ref 0–0.2)
IMM GRANULOCYTES NFR BLD AUTO: 0 % (ref 0–2)
KCT BLD-ACNC: 129 SEC (ref 89–137)
KCT BLD-ACNC: 147 SEC (ref 89–137)
KCT BLD-ACNC: 327 SEC (ref 89–137)
LYMPHOCYTES # BLD AUTO: 1.62 THOUSANDS/ÂΜL (ref 0.6–4.47)
LYMPHOCYTES NFR BLD AUTO: 27 % (ref 14–44)
MCH RBC QN AUTO: 33.6 PG (ref 26.8–34.3)
MCHC RBC AUTO-ENTMCNC: 33.1 G/DL (ref 31.4–37.4)
MCV RBC AUTO: 101 FL (ref 82–98)
MONOCYTES # BLD AUTO: 0.15 THOUSAND/ÂΜL (ref 0.17–1.22)
MONOCYTES NFR BLD AUTO: 3 % (ref 4–12)
NEUTROPHILS # BLD AUTO: 4.04 THOUSANDS/ÂΜL (ref 1.85–7.62)
NEUTS SEG NFR BLD AUTO: 68 % (ref 43–75)
NRBC BLD AUTO-RTO: 0 /100 WBCS
PLATELET # BLD AUTO: 150 THOUSANDS/UL (ref 149–390)
PMV BLD AUTO: 10.5 FL (ref 8.9–12.7)
POTASSIUM SERPL-SCNC: 4.6 MMOL/L (ref 3.5–5.3)
PROT SERPL-MCNC: 6.4 G/DL (ref 6.4–8.4)
RBC # BLD AUTO: 4.17 MILLION/UL (ref 3.88–5.62)
RH BLD: POSITIVE
SODIUM SERPL-SCNC: 140 MMOL/L (ref 135–147)
SPECIMEN EXPIRATION DATE: NORMAL
SPECIMEN SOURCE: ABNORMAL
SPECIMEN SOURCE: ABNORMAL
SPECIMEN SOURCE: NORMAL
WBC # BLD AUTO: 5.93 THOUSAND/UL (ref 4.31–10.16)

## 2024-11-08 PROCEDURE — C1894 INTRO/SHEATH, NON-LASER: HCPCS | Performed by: SURGERY

## 2024-11-08 PROCEDURE — C1769 GUIDE WIRE: HCPCS | Performed by: SURGERY

## 2024-11-08 PROCEDURE — NC001 PR NO CHARGE: Performed by: SURGERY

## 2024-11-08 PROCEDURE — 36200 PLACE CATHETER IN AORTA: CPT | Performed by: SURGERY

## 2024-11-08 PROCEDURE — 76937 US GUIDE VASCULAR ACCESS: CPT

## 2024-11-08 PROCEDURE — 86900 BLOOD TYPING SEROLOGIC ABO: CPT | Performed by: SURGERY

## 2024-11-08 PROCEDURE — 86850 RBC ANTIBODY SCREEN: CPT | Performed by: SURGERY

## 2024-11-08 PROCEDURE — 82948 REAGENT STRIP/BLOOD GLUCOSE: CPT

## 2024-11-08 PROCEDURE — 80053 COMPREHEN METABOLIC PANEL: CPT | Performed by: SURGERY

## 2024-11-08 PROCEDURE — C1760 CLOSURE DEV, VASC: HCPCS | Performed by: SURGERY

## 2024-11-08 PROCEDURE — 86901 BLOOD TYPING SEROLOGIC RH(D): CPT | Performed by: SURGERY

## 2024-11-08 PROCEDURE — C1887 CATHETER, GUIDING: HCPCS | Performed by: SURGERY

## 2024-11-08 PROCEDURE — 37799 UNLISTED PX VASCULAR SURGERY: CPT | Performed by: SURGERY

## 2024-11-08 PROCEDURE — 85025 COMPLETE CBC W/AUTO DIFF WBC: CPT | Performed by: SURGERY

## 2024-11-08 PROCEDURE — 85347 COAGULATION TIME ACTIVATED: CPT

## 2024-11-08 DEVICE — PERCLOSE™ PROSTYLE™ SUTURE-MEDIATED CLOSURE AND REPAIR SYSTEM
Type: IMPLANTABLE DEVICE | Site: GROIN | Status: FUNCTIONAL
Brand: PERCLOSE™ PROSTYLE™

## 2024-11-08 RX ORDER — PROTAMINE SULFATE 10 MG/ML
INJECTION, SOLUTION INTRAVENOUS AS NEEDED
Status: DISCONTINUED | OUTPATIENT
Start: 2024-11-08 | End: 2024-11-08

## 2024-11-08 RX ORDER — ALBUTEROL SULFATE 0.83 MG/ML
2.5 SOLUTION RESPIRATORY (INHALATION) ONCE AS NEEDED
Status: DISCONTINUED | OUTPATIENT
Start: 2024-11-08 | End: 2024-11-08 | Stop reason: HOSPADM

## 2024-11-08 RX ORDER — HEPARIN SODIUM 1000 [USP'U]/ML
INJECTION, SOLUTION INTRAVENOUS; SUBCUTANEOUS AS NEEDED
Status: DISCONTINUED | OUTPATIENT
Start: 2024-11-08 | End: 2024-11-08

## 2024-11-08 RX ORDER — HEPARIN SODIUM 200 [USP'U]/100ML
INJECTION, SOLUTION INTRAVENOUS
Status: COMPLETED | OUTPATIENT
Start: 2024-11-08 | End: 2024-11-08

## 2024-11-08 RX ORDER — DEXAMETHASONE SODIUM PHOSPHATE 10 MG/ML
INJECTION, SOLUTION INTRAMUSCULAR; INTRAVENOUS AS NEEDED
Status: DISCONTINUED | OUTPATIENT
Start: 2024-11-08 | End: 2024-11-08

## 2024-11-08 RX ORDER — LIDOCAINE HYDROCHLORIDE 10 MG/ML
INJECTION, SOLUTION EPIDURAL; INFILTRATION; INTRACAUDAL; PERINEURAL AS NEEDED
Status: DISCONTINUED | OUTPATIENT
Start: 2024-11-08 | End: 2024-11-08

## 2024-11-08 RX ORDER — AMLODIPINE BESYLATE 5 MG/1
5 TABLET ORAL DAILY
Status: DISCONTINUED | OUTPATIENT
Start: 2024-11-08 | End: 2024-11-08 | Stop reason: HOSPADM

## 2024-11-08 RX ORDER — ACETAMINOPHEN 325 MG/1
650 TABLET ORAL EVERY 4 HOURS PRN
Status: DISCONTINUED | OUTPATIENT
Start: 2024-11-08 | End: 2024-11-08 | Stop reason: HOSPADM

## 2024-11-08 RX ORDER — METOCLOPRAMIDE HYDROCHLORIDE 5 MG/ML
10 INJECTION INTRAMUSCULAR; INTRAVENOUS ONCE AS NEEDED
Status: DISCONTINUED | OUTPATIENT
Start: 2024-11-08 | End: 2024-11-08 | Stop reason: HOSPADM

## 2024-11-08 RX ORDER — SODIUM CHLORIDE 9 MG/ML
INJECTION, SOLUTION INTRAVENOUS CONTINUOUS PRN
Status: DISCONTINUED | OUTPATIENT
Start: 2024-11-08 | End: 2024-11-08

## 2024-11-08 RX ORDER — SODIUM CHLORIDE, SODIUM LACTATE, POTASSIUM CHLORIDE, CALCIUM CHLORIDE 600; 310; 30; 20 MG/100ML; MG/100ML; MG/100ML; MG/100ML
INJECTION, SOLUTION INTRAVENOUS CONTINUOUS PRN
Status: DISCONTINUED | OUTPATIENT
Start: 2024-11-08 | End: 2024-11-08

## 2024-11-08 RX ORDER — FENTANYL CITRATE/PF 50 MCG/ML
25 SYRINGE (ML) INJECTION
Status: DISCONTINUED | OUTPATIENT
Start: 2024-11-08 | End: 2024-11-08 | Stop reason: HOSPADM

## 2024-11-08 RX ORDER — MIDAZOLAM HYDROCHLORIDE 2 MG/2ML
INJECTION, SOLUTION INTRAMUSCULAR; INTRAVENOUS AS NEEDED
Status: DISCONTINUED | OUTPATIENT
Start: 2024-11-08 | End: 2024-11-08

## 2024-11-08 RX ORDER — HYDROMORPHONE HCL IN WATER/PF 6 MG/30 ML
0.2 PATIENT CONTROLLED ANALGESIA SYRINGE INTRAVENOUS
Status: DISCONTINUED | OUTPATIENT
Start: 2024-11-08 | End: 2024-11-08 | Stop reason: HOSPADM

## 2024-11-08 RX ORDER — FENTANYL CITRATE 50 UG/ML
INJECTION, SOLUTION INTRAMUSCULAR; INTRAVENOUS AS NEEDED
Status: DISCONTINUED | OUTPATIENT
Start: 2024-11-08 | End: 2024-11-08

## 2024-11-08 RX ORDER — IODIXANOL 320 MG/ML
INJECTION, SOLUTION INTRAVASCULAR AS NEEDED
Status: DISCONTINUED | OUTPATIENT
Start: 2024-11-08 | End: 2024-11-08 | Stop reason: HOSPADM

## 2024-11-08 RX ORDER — CHLORHEXIDINE GLUCONATE ORAL RINSE 1.2 MG/ML
15 SOLUTION DENTAL ONCE
Status: COMPLETED | OUTPATIENT
Start: 2024-11-08 | End: 2024-11-08

## 2024-11-08 RX ORDER — HYDROCODONE BITARTRATE AND ACETAMINOPHEN 5; 325 MG/1; MG/1
1 TABLET ORAL EVERY 4 HOURS PRN
Status: DISCONTINUED | OUTPATIENT
Start: 2024-11-08 | End: 2024-11-08 | Stop reason: HOSPADM

## 2024-11-08 RX ORDER — ONDANSETRON 2 MG/ML
INJECTION INTRAMUSCULAR; INTRAVENOUS AS NEEDED
Status: DISCONTINUED | OUTPATIENT
Start: 2024-11-08 | End: 2024-11-08

## 2024-11-08 RX ORDER — ROCURONIUM BROMIDE 10 MG/ML
INJECTION, SOLUTION INTRAVENOUS AS NEEDED
Status: DISCONTINUED | OUTPATIENT
Start: 2024-11-08 | End: 2024-11-08

## 2024-11-08 RX ORDER — PROPOFOL 10 MG/ML
INJECTION, EMULSION INTRAVENOUS AS NEEDED
Status: DISCONTINUED | OUTPATIENT
Start: 2024-11-08 | End: 2024-11-08

## 2024-11-08 RX ORDER — LABETALOL HYDROCHLORIDE 5 MG/ML
INJECTION, SOLUTION INTRAVENOUS AS NEEDED
Status: DISCONTINUED | OUTPATIENT
Start: 2024-11-08 | End: 2024-11-08

## 2024-11-08 RX ORDER — ONDANSETRON 2 MG/ML
4 INJECTION INTRAMUSCULAR; INTRAVENOUS ONCE AS NEEDED
Status: DISCONTINUED | OUTPATIENT
Start: 2024-11-08 | End: 2024-11-08 | Stop reason: HOSPADM

## 2024-11-08 RX ORDER — CEFAZOLIN SODIUM 2 G/50ML
2000 SOLUTION INTRAVENOUS ONCE
Status: COMPLETED | OUTPATIENT
Start: 2024-11-08 | End: 2024-11-08

## 2024-11-08 RX ORDER — LIDOCAINE HYDROCHLORIDE 10 MG/ML
INJECTION, SOLUTION EPIDURAL; INFILTRATION; INTRACAUDAL; PERINEURAL
Status: DISCONTINUED
Start: 2024-11-08 | End: 2024-11-08 | Stop reason: HOSPADM

## 2024-11-08 RX ADMIN — FENTANYL CITRATE 50 MCG: 50 INJECTION INTRAMUSCULAR; INTRAVENOUS at 08:37

## 2024-11-08 RX ADMIN — LABETALOL HYDROCHLORIDE 10 MG: 5 INJECTION, SOLUTION INTRAVENOUS at 09:47

## 2024-11-08 RX ADMIN — AMLODIPINE BESYLATE 5 MG: 5 TABLET ORAL at 14:38

## 2024-11-08 RX ADMIN — PROPOFOL 50 MG: 10 INJECTION, EMULSION INTRAVENOUS at 07:58

## 2024-11-08 RX ADMIN — NICARDIPINE HYDROCHLORIDE 200 MCG: 2.5 INJECTION, SOLUTION INTRAVENOUS at 08:40

## 2024-11-08 RX ADMIN — DEXAMETHASONE SODIUM PHOSPHATE 10 MG: 10 INJECTION, SOLUTION INTRAMUSCULAR; INTRAVENOUS at 07:55

## 2024-11-08 RX ADMIN — LABETALOL HYDROCHLORIDE 10 MG: 5 INJECTION, SOLUTION INTRAVENOUS at 09:37

## 2024-11-08 RX ADMIN — SODIUM CHLORIDE: 0.9 INJECTION, SOLUTION INTRAVENOUS at 08:09

## 2024-11-08 RX ADMIN — ROCURONIUM BROMIDE 50 MG: 10 INJECTION, SOLUTION INTRAVENOUS at 07:55

## 2024-11-08 RX ADMIN — NICARDIPINE HYDROCHLORIDE 100 MCG: 2.5 INJECTION, SOLUTION INTRAVENOUS at 08:59

## 2024-11-08 RX ADMIN — NICARDIPINE HYDROCHLORIDE 100 MCG: 2.5 INJECTION, SOLUTION INTRAVENOUS at 08:32

## 2024-11-08 RX ADMIN — NICARDIPINE HYDROCHLORIDE 200 MCG: 2.5 INJECTION, SOLUTION INTRAVENOUS at 09:07

## 2024-11-08 RX ADMIN — ROCURONIUM BROMIDE 20 MG: 10 INJECTION, SOLUTION INTRAVENOUS at 08:23

## 2024-11-08 RX ADMIN — FENTANYL CITRATE 100 MCG: 50 INJECTION INTRAMUSCULAR; INTRAVENOUS at 07:55

## 2024-11-08 RX ADMIN — NICARDIPINE HYDROCHLORIDE 200 MCG: 2.5 INJECTION, SOLUTION INTRAVENOUS at 09:28

## 2024-11-08 RX ADMIN — NICARDIPINE HYDROCHLORIDE 200 MCG: 2.5 INJECTION, SOLUTION INTRAVENOUS at 09:59

## 2024-11-08 RX ADMIN — SODIUM CHLORIDE, SODIUM LACTATE, POTASSIUM CHLORIDE, AND CALCIUM CHLORIDE: .6; .31; .03; .02 INJECTION, SOLUTION INTRAVENOUS at 07:45

## 2024-11-08 RX ADMIN — PROPOFOL 150 MG: 10 INJECTION, EMULSION INTRAVENOUS at 07:55

## 2024-11-08 RX ADMIN — FENTANYL CITRATE 50 MCG: 50 INJECTION INTRAMUSCULAR; INTRAVENOUS at 09:28

## 2024-11-08 RX ADMIN — CEFAZOLIN SODIUM 2000 MG: 2 SOLUTION INTRAVENOUS at 08:13

## 2024-11-08 RX ADMIN — HEPARIN SODIUM 10000 UNITS: 1000 INJECTION INTRAVENOUS; SUBCUTANEOUS at 08:33

## 2024-11-08 RX ADMIN — CHLORHEXIDINE GLUCONATE 0.12% ORAL RINSE 15 ML: 1.2 LIQUID ORAL at 06:22

## 2024-11-08 RX ADMIN — PROTAMINE SULFATE 50 MG: 10 INJECTION, SOLUTION INTRAVENOUS at 09:19

## 2024-11-08 RX ADMIN — NICARDIPINE HYDROCHLORIDE 100 MCG: 2.5 INJECTION, SOLUTION INTRAVENOUS at 08:15

## 2024-11-08 RX ADMIN — PROPOFOL 50 MG: 10 INJECTION, EMULSION INTRAVENOUS at 08:12

## 2024-11-08 RX ADMIN — PROTAMINE SULFATE 30 MG: 10 INJECTION, SOLUTION INTRAVENOUS at 09:24

## 2024-11-08 RX ADMIN — LIDOCAINE HYDROCHLORIDE 50 MG: 10 INJECTION, SOLUTION EPIDURAL; INFILTRATION; INTRACAUDAL; PERINEURAL at 07:55

## 2024-11-08 RX ADMIN — ONDANSETRON 4 MG: 2 INJECTION INTRAMUSCULAR; INTRAVENOUS at 07:55

## 2024-11-08 RX ADMIN — NICARDIPINE HYDROCHLORIDE 100 MCG: 2.5 INJECTION, SOLUTION INTRAVENOUS at 08:56

## 2024-11-08 RX ADMIN — SUGAMMADEX 200 MG: 100 INJECTION, SOLUTION INTRAVENOUS at 09:38

## 2024-11-08 RX ADMIN — NICARDIPINE HYDROCHLORIDE 200 MCG: 2.5 INJECTION, SOLUTION INTRAVENOUS at 09:01

## 2024-11-08 RX ADMIN — PROPOFOL 50 MG: 10 INJECTION, EMULSION INTRAVENOUS at 09:04

## 2024-11-08 RX ADMIN — MIDAZOLAM 2 MG: 1 INJECTION INTRAMUSCULAR; INTRAVENOUS at 07:42

## 2024-11-08 RX ADMIN — NICARDIPINE HYDROCHLORIDE 200 MCG: 2.5 INJECTION, SOLUTION INTRAVENOUS at 08:34

## 2024-11-08 NOTE — ANESTHESIA PROCEDURE NOTES
Arterial Line Insertion    Performed by: Linda Clemons CRNA  Authorized by: Silvio Angeles MD  Preparation: Patient was prepped and draped in the usual sterile fashion.  Indications: hemodynamic monitoring  Orientation:  Right  Location: radial artery  Procedure Details:  Judah's test normal: yes  Needle gauge: 20  Seldinger technique: Seldinger technique used  Number of attempts: 1    Post-procedure:  Post-procedure: dressing applied  Waveform: good waveform and waveform confirmed  Post-procedure CNS: normal and unchanged  Patient tolerance: Patient tolerated the procedure well with no immediate complications

## 2024-11-08 NOTE — ANESTHESIA POSTPROCEDURE EVALUATION
Post-Op Assessment Note    CV Status:  Stable  Pain Score: 0    Pain management: adequate       Mental Status:  Alert and awake   Hydration Status:  Euvolemic   PONV Controlled:  Controlled   Airway Patency:  Patent  Two or more mitigation strategies used for obstructive sleep apnea   Post Op Vitals Reviewed: Yes    No anethesia notable event occurred.    Staff: CRNA           Last Filed PACU Vitals:  Vitals Value Taken Time   Temp 97.1    Pulse 65 11/08/24 1000   /87 11/08/24 1000   Resp 47 11/08/24 1000   SpO2 94 % 11/08/24 1000   Vitals shown include unfiled device data.    Modified [place]:  No data recorded

## 2024-11-08 NOTE — UTILIZATION REVIEW
Initial Clinical Review    Elective inpatient  surgical procedure  Age/Sex: 69 y.o. male  Surgery Date: 11-08-24  Procedure: Post-Op Diagnosis Codes:     * Infrarenal abdominal aortic aneurysm (AAA) without rupture (HCC) [I71.43]  Procedure(s):  ENDOVASCULAR TREATMENT OF AORTIC ANEURYSM WITH [facility] SYSTEM  Q1 Modifier   IND #[id number]  ClinicalTrials.gov - CT/NCT [id number]  Please bill for code 47409 and   CPT 86205     Anesthesia: general   Operative Findings: 3.5 cm infrarenal abdominal aortic aneurysm with adequate neck  Patent bilateral renal arteries.  Patent bilateral common iliac arteries.        Admission Orders: Date/Time/Statement:   Admission Orders (From admission, onward)       Ordered        11/08/24 1520  INPATIENT ADMISSION  Once                          Orders Placed This Encounter   Procedures    INPATIENT ADMISSION     Standing Status:   Standing     Number of Occurrences:   1     Order Specific Question:   Level of Care     Answer:   Med Surg [16]     Order Specific Question:   Estimated length of stay     Answer:   Inpatient Only Surgery     Diet: as tolerate  Mobility: ambulate  DVT Prophylaxis: ambulate    Medications/Pain Control:   Scheduled Medications:  amLODIPine, 5 mg, Oral, Daily  lidocaine (PF), , ,       Continuous IV Infusions:     PRN Meds:  acetaminophen, 650 mg, Oral, Q4H PRN  HYDROcodone-acetaminophen, 1 tablet, Oral, Q4H PRN  lidocaine (PF), , ,       Vital Signs (last 3 days)       Date/Time Temp Pulse Resp BP MAP (mmHg) SpO2 O2 Flow Rate (L/min) O2 Device Cardiac (WDL) Pain    11/08/24 1445 97.6 °F (36.4 °C) 73 17 130/92 -- 96 % -- None (Room air) -- No Pain    11/08/24 1345 97.6 °F (36.4 °C) 72 -- 148/90 -- 96 % -- None (Room air) -- No Pain    11/08/24 1245 97.4 °F (36.3 °C) 72 18 159/94 -- 94 % -- None (Room air) -- No Pain    11/08/24 1145 97.1 °F (36.2 °C) 68 17 156/99 -- 94 % -- None (Room air) -- No Pain    11/08/24 1130 -- 67 -- 159/100 125 97 % -- -- -- --     "11/08/24 1115 97.5 °F (36.4 °C) 65 16 152/97 118 95 % -- -- WDL --    11/08/24 1100 -- 67 18 168/101 129 96 % -- -- WDL --    11/08/24 1045 -- 65 16 163/96 125 95 % -- -- WDL --    11/08/24 1030 -- 67 20 168/90 123 95 % -- -- WDL --    11/08/24 1015 -- 65 17 141/96 114 96 % -- -- WDL --    11/08/24 1000 -- 65 18 141/87 110 94 % -- -- -- --    11/08/24 0959 -- 65 18 138/88 108 96 % -- -- -- --    11/08/24 0954 97 °F (36.1 °C) 64 18 161/97 123 96 % 6 L/min Simple mask WDL No Pain    11/08/24 0614 -- -- -- -- -- -- -- -- -- No Pain    11/08/24 0601 98 °F (36.7 °C) 76 18 155/97 -- 97 % -- None (Room air) -- --          Weight (last 2 days)       Date/Time Weight    11/08/24 0614 104 (230)            Pertinent Labs/Diagnostic Test Results:   Radiology:  IR abdominal aortagram    (Results Pending)     Cardiology:  No orders to display     GI:  No orders to display           Results from last 7 days   Lab Units 11/08/24  1006   WBC Thousand/uL 5.93   HEMOGLOBIN g/dL 14.0   HEMATOCRIT % 42.3   PLATELETS Thousands/uL 150   TOTAL NEUT ABS Thousands/µL 4.04         Results from last 7 days   Lab Units 11/08/24  1006   SODIUM mmol/L 140   POTASSIUM mmol/L 4.6   CHLORIDE mmol/L 113*   CO2 mmol/L 21   ANION GAP mmol/L 6   BUN mg/dL 14   CREATININE mg/dL 0.88   EGFR ml/min/1.73sq m 88   CALCIUM mg/dL 7.5*     Results from last 7 days   Lab Units 11/08/24  1006   AST U/L 24   ALT U/L 42   ALK PHOS U/L 46   TOTAL PROTEIN g/dL 6.4   ALBUMIN g/dL 3.6   TOTAL BILIRUBIN mg/dL 0.43     Results from last 7 days   Lab Units 11/08/24  1004 11/08/24  0607   POC GLUCOSE mg/dl 196* 228*     Results from last 7 days   Lab Units 11/08/24  1006   GLUCOSE RANDOM mg/dL 225*             No results found for: \"BETA-HYDROXYBUTYRATE\"                                                                                                                                         Network Utilization Review Department  ATTENTION: Please call with any questions or " concerns to [phone] and carefully listen to the prompts so that you are directed to the right person. All voicemails are confidential.   For Discharge needs, contact Care Management DC Support Team at [phone] opt. [phone]  Send all requests for admission clinical reviews, approved or denied determinations and any other requests to dedicated fax number below belonging to the [place] where the patient is receiving treatment. List of dedicated fax numbers for the Facilities:  FACILITY NAME UR FAX NUMBER   ADMISSION DENIALS (Administrative/Medical Necessity) [phone]   DISCHARGE SUPPORT TEAM (NETWORK) [phone]   PARENT CHILD HEALTH (Maternity/NICU/Pediatrics) [phone]   [facility] [phone]   [facility] [phone]   [facility] [phone]   [facility] [phone]   [facility] [phone]   [facility] [phone]   [facility] [phone]   [facility] [phone]   [facility] [phone]   [facility] [phone]   [facility] [phone]   [facility] [phone]

## 2024-11-08 NOTE — OP NOTE
OPERATIVE REPORT  PATIENT NAME: Felice Jose    :  1955  MRN: [id number]  Pt Location: [facility] 02    SURGERY DATE: 2024    Surgeons and Role:     * Zarina Yousif MD - Primary    Preop Diagnosis:  Infrarenal abdominal aortic aneurysm (AAA) without rupture (HCC) [I71.43]    Post-Op Diagnosis Codes:     * Infrarenal abdominal aortic aneurysm (AAA) without rupture (HCC) [I71.43]    Procedure(s):  ENDOVASCULAR TREATMENT OF AORTIC ANEURYSM WITH NECTERO EAST SYSTEM     Q1 Modifier   IND #[id number]  ClinicalTrials.gov - CT/NCT [id number]  Please bill for code 86336 and   CPT 91354    Specimen(s):  * No specimens in log *    Estimated Blood Loss:   50cc    Drains:  * No LDAs found *    Anesthesia Type:   General Endotracheal    Operative Indications:  Infrarenal abdominal aortic aneurysm (AAA) without rupture (HCC) [I71.43]  3.5 cm infrarenal abdominal aortic aneurysm    Operative Findings:  3.5 cm infrarenal abdominal aortic aneurysm with adequate neck  Patent bilateral renal arteries.  Patent bilateral common iliac arteries.    Complications:   None     Operative Findings:  Infrarenal AAA treated with 25ml of stabilizer drug as part of clinical trial     Total contrast used = 40ml  Total diplacement volume = 60ml  Total volume in occlusion balloon = 8 ml     2+ DP pulses on completion  Patient remained hemodynamically stable for duration of case     Complications:   None     Procedure and Technique:     The patient was placed in the supine position on the operating table. The procedure was performed under anesthesia. An arterial line were placed for monitoring. The abdomen and both groins were prepped and draped in the standard sterile  Fashion using Ioban drape. Prophylactic intravenous antibiotics were given prior to access.  A full timeout was carried out with all parties present.      The right common femoral artery was accessed using a Seldinger Technique with ultrasound guidance using  micropunture kit.  Ultrasound guidance was necessary to achieve accurate puncture for percutaneous access and closure.  An image capturing needle entry into the artery was documented in permanent record.  The common femoral artery was patent  A starter wire was advanced into the aorta and a 6-Fr dilator sheath was placed.Two Proglide sutures were deployed in the common femoral artery and tagged outside the puncture.      Patient was given 61245 units iv heparin given to achieve ACT>250.     Initially an 8-Fr sheath was placed.  Using KMP catheter we placed Lunderquist wire into the thoracic aorta.  Then a 14 Fr x 45 cm Cook sheath was placed in the infrarenal aorta.     Aortogram was performed with shannan the renal arteries and the aortic bifurcation.     The SoundTag Device was obtained and prepped as per IFU.     The device delivery system was advanced over stiff wire up to the lowest renal.  The sheath was pulled back in to the right common iliac artery.     The SBP was lowered to 100s. Then we inflated the infrarenal occlusion balloon with 8 ml contrast + saline mixture.  The pressure tranducer showed that the sac pressure dropped to 10mmHg.  Then the drug delivery balloon was filled gently with 60 cc saline/contrast mixture to displace the blood from the aortic sac.  Good wall apposition was observed.       Next over 3 minutes 25cc of investigational drug PGG was delivered to the aortic sac.     Then the balloons were deflated slowly.  Once the balloons were completely deflated we removed the delivery system.     Completion aortogram was performed which did not show any evidence of aortic dissection or rupture with intact renal and distal perfusion.     The Sheath withdrawn. The arteriotomies were closed with the Proglide sutures and the closure was hemostatic as the Lunderquist was removed. The skin was closed using Exofin and dry dressings.      The patient tolerated the procedure well and was taken to the  postanesthesia care unit in stable condition.  He has palpable 2+ DP pulses at end of procedure.     I was present for the entire procedure.    Patient Disposition:  PACU              SIGNATURE: Zarina Yousif MD  DATE: November 8, 2024  TIME: 10:50 AM

## 2024-11-08 NOTE — INTERVAL H&P NOTE
H&P reviewed. After examining the patient I find no changes in the patients condition since the H&P had been written.    Vitals:    11/08/24 0601   BP: 155/97   Pulse: 76   Resp: 18   Temp: 98 °F (36.7 °C)   SpO2: 97%

## 2024-11-08 NOTE — DISCHARGE INSTR - AVS FIRST PAGE
DISCHARGE INSTRUCTIONS  ARTERIOGRAM/ANGIOPLASTY/STENT    ACTIVITY: On the evening following the procedure, you should be mostly resting.  Someone should remain with you during the evening and overnight following the procedure.     On the day after your procedure, limit your activity to walking.  Avoid heavy lifting (no more than 20 lbs) for the 2 weeks. Walking up steps and normal activities may be resumed as you feel ready.   You should not drive a car for at least two days following discharge from the hospital. You may ride in a car.   If you have any questions regarding a particular activity, please discuss with your doctor or nurse before you are discharged.    DIET:  Resume your normal diet.  Drink more water than usual for the next 24 hours.    PROCEDURE SITE: You may have a procedure site in your groin, arm, or foot.  You may have surgical glue at your procedure site.  The glue is used to cover the procedure site, assist in closure, and prevent contamination. This adhesive will darken and peel away on its own within one to two weeks. Do not pick at it.    You should shower daily.  Wash incision daily with soap and water, but do not rub or scrub the incision; rinse thoroughly and pat dry.  Do not bathe in a tub or swim for the first 2 week following your procedure or if you have any open wounds.  It is normal to have some bruising, swelling or discoloration around the procedure site.  IF increasing redness, pain, or a bulge develops, call our office immediately.    If present, you may remove the band-aid or “steri-strips” over your procedure site after two days.   If you notice any active bleeding at the site, apply pressure to the site and call our office ([phone]) or 471.    FOLLOW UP STUDIES:  Your doctor will discuss whether further treatments or follow-up studies are necessary at your first post procedure visit.    FOLLOW UP APPOINTMENTS:  Making and keeping follow up appointments and ultrasound  tests are important to your recovery.  If you have difficulty making it to or keeping your follow up appointments, call the office.    DO NOT TAKE METFORMIN FOR NEXT 2 DAYS.  START TAKING IT ON 11/11/2024    If you have increased pain, fever >101.5, increased drainage, redness or a bad smell at your surgery site, new coldness/numbness of your arm or leg, please call us immediately and GO directly to the ER.    PLEASE CALL THE OFFICE IF YOU HAVE ANY QUESTIONS  [phone]  [phone] [address], [address], [place] [address]  [address] [place] [address]  [address] [place] [address] OZZIE Friedman [address]  [address] [facility], 1st [place] [address]  [address] [facility], [address], [place] [address]  [address] [facility], [address], [place] [address]  [address] [facility], [facility], 2nd Floor, [place] [address]  [address] [facility], 1st Floor, [address], [place] [address]  [address] [facility], [facility], [place] [address]  [address] [facility], [address], [place] [address]

## 2024-11-08 NOTE — ANESTHESIA PREPROCEDURE EVALUATION
Procedure:  NECTERO EAST Aortic balloon angioplasty (Abdomen)    Relevant Problems   CARDIO   (+) AAA (abdominal aortic aneurysm) without rupture (HCC)   (+) Hypertension      /RENAL   (+) BPH without obstruction/lower urinary tract symptoms   (+) Renal stones      MUSCULOSKELETAL   (+) Osteoarthritis of left hip      NEURO/PSYCH   (+) Legally blind in right eye, as defined in [place]      Behavioral Health   (+) Former smoker      Urinary   (+) Microscopic hematuria   (+) Retention of urine      Surgery/Wound/Pain   (+) Preoperative cardiovascular examination      Other   (+) Class 1 obesity due to excess calories without serious comorbidity with body mass index (BMI) of 32.0 to 32.9 in adult   (+) Elevated LFTs   (+) Prediabetes      Lab Results   Component Value Date    SODIUM 140 10/28/2024    K 4.4 10/28/2024     10/28/2024    CO2 27 10/28/2024    AGAP 10 10/28/2024    BUN 12 10/28/2024    CREATININE 0.92 10/28/2024    GLUC 117 08/22/2023    GLUF 177 (H) 10/28/2024    CALCIUM 8.9 10/28/2024    AST 27 10/28/2024    ALT 43 10/28/2024    ALKPHOS 50 10/28/2024    TP 6.9 10/28/2024    TBILI 0.79 10/28/2024    EGFR 85 10/28/2024     Lab Results   Component Value Date    WBC 5.79 10/28/2024    HGB 15.8 10/28/2024    HCT 47.2 10/28/2024     (H) 10/28/2024     10/28/2024     TTE  This result has an attachment that is not available.     Left Ventricle: Left ventricle is normal in size. There is mild   hypertrophy. Systolic function is normal with an ejection fraction of   60-65%. There is grade I (mild) diastolic dysfunction.     Right Ventricle: Systolic function is normal.     No significant valvular abnormalities.     Left Ventricle   Left ventricle is normal in size. There is mild hypertrophy. Systolic function is normal with an ejection fraction of 60-65%. Wall motion is within normal limits. There is grade I (mild) diastolic dysfunction.     Right Ventricle   Right ventricle cavity is normal.  Systolic function is normal.     Left Atrium   Left atrium cavity size is normal.     Right Atrium   Right atrium cavity is normal.     IVC/SVC   The inferior vena cava demonstrates a diameter of <=21 mm and collapses >50%; therefore, the right atrial pressure is estimated at 0-5 mmHg.     Mitral Valve   Mitral valve structure is normal. There is no regurgitation or stenosis.     Tricuspid Valve   Tricuspid valve structure is normal. There is trace regurgitation. There is no evidence of tricuspid valve stenosis.     Aortic Valve   The aortic valve is trileaflet. There is no regurgitation or stenosis.     Pulmonic Valve   The pulmonic valve was not well visualized. There is no regurgitation or stenosis.     Ascending Aorta   The aorta appears normal in size.     Pericardium   There is no pericardial effusion.     Study Details   A complete 2D echocardiogram was performed. Color flow, Pulse Wave and Continuous Wave Doppler was performed and analyzed.Overall the study quality was adequate. The study had technical difficulties.          Anesthesia Plan  ASA Score- 3     Anesthesia Type- general with ASA Monitors.         Additional Monitors:     Airway Plan:            Plan Factors-Exercise tolerance (METS): >4 METS.    Chart reviewed. EKG reviewed. Imaging results reviewed. Existing labs reviewed. Patient summary reviewed.                  Induction- intravenous.    Postoperative Plan-         Informed Consent- Anesthetic plan and risks discussed with patient.  I personally reviewed this patient with the CRNA. Discussed and agreed on the Anesthesia Plan with the CRNA..

## 2024-11-11 NOTE — ANESTHESIA POSTPROCEDURE EVALUATION
Post-Op Assessment Note    CV Status:  Stable    Pain management: adequate       Mental Status:  Alert and awake   Hydration Status:  Euvolemic   PONV Controlled:  Controlled   Airway Patency:  Patent     Post Op Vitals Reviewed: Yes    No anethesia notable event occurred.    Staff: Anesthesiologist           Last Filed PACU Vitals:  Vitals Value Taken Time   Temp 97.1 °F (36.2 °C) 11/08/24 1145   Pulse 68 11/08/24 1145   /99 11/08/24 1145   Resp 17 11/08/24 1145   SpO2 94 % 11/08/24 1145       Modified Santa:  No data recorded

## 2025-02-03 DIAGNOSIS — I71.43 INFRARENAL ABDOMINAL AORTIC ANEURYSM (AAA) WITHOUT RUPTURE (HCC): Primary | ICD-10-CM

## 2025-02-06 ENCOUNTER — APPOINTMENT (OUTPATIENT)
Dept: LAB | Facility: IMAGING CENTER | Age: 70
End: 2025-02-06
Payer: COMMERCIAL

## 2025-02-06 DIAGNOSIS — I71.43 INFRARENAL ABDOMINAL AORTIC ANEURYSM (AAA) WITHOUT RUPTURE (HCC): ICD-10-CM

## 2025-02-06 LAB
ALBUMIN SERPL BCG-MCNC: 4.1 G/DL (ref 3.5–5)
ALP SERPL-CCNC: 63 U/L (ref 34–104)
ALT SERPL W P-5'-P-CCNC: 37 U/L (ref 7–52)
ANION GAP SERPL CALCULATED.3IONS-SCNC: 12 MMOL/L (ref 4–13)
AST SERPL W P-5'-P-CCNC: 28 U/L (ref 13–39)
BASOPHILS # BLD AUTO: 0.03 THOUSANDS/ΜL (ref 0–0.1)
BASOPHILS NFR BLD AUTO: 1 % (ref 0–1)
BILIRUB SERPL-MCNC: 0.62 MG/DL (ref 0.2–1)
BUN SERPL-MCNC: 11 MG/DL (ref 5–25)
CALCIUM SERPL-MCNC: 9 MG/DL (ref 8.4–10.2)
CHLORIDE SERPL-SCNC: 101 MMOL/L (ref 96–108)
CO2 SERPL-SCNC: 26 MMOL/L (ref 21–32)
CREAT SERPL-MCNC: 0.96 MG/DL (ref 0.6–1.3)
EOSINOPHIL # BLD AUTO: 0.14 THOUSAND/ΜL (ref 0–0.61)
EOSINOPHIL NFR BLD AUTO: 2 % (ref 0–6)
ERYTHROCYTE [DISTWIDTH] IN BLOOD BY AUTOMATED COUNT: 12.1 % (ref 11.6–15.1)
GFR SERPL CREATININE-BSD FRML MDRD: 81 ML/MIN/1.73SQ M
GLUCOSE P FAST SERPL-MCNC: 188 MG/DL (ref 65–99)
HCT VFR BLD AUTO: 49 % (ref 36.5–49.3)
HGB BLD-MCNC: 16.2 G/DL (ref 12–17)
IMM GRANULOCYTES # BLD AUTO: 0.02 THOUSAND/UL (ref 0–0.2)
IMM GRANULOCYTES NFR BLD AUTO: 0 % (ref 0–2)
LYMPHOCYTES # BLD AUTO: 2.5 THOUSANDS/ΜL (ref 0.6–4.47)
LYMPHOCYTES NFR BLD AUTO: 42 % (ref 14–44)
MCH RBC QN AUTO: 33.6 PG (ref 26.8–34.3)
MCHC RBC AUTO-ENTMCNC: 33.1 G/DL (ref 31.4–37.4)
MCV RBC AUTO: 102 FL (ref 82–98)
MONOCYTES # BLD AUTO: 0.46 THOUSAND/ΜL (ref 0.17–1.22)
MONOCYTES NFR BLD AUTO: 8 % (ref 4–12)
NEUTROPHILS # BLD AUTO: 2.86 THOUSANDS/ΜL (ref 1.85–7.62)
NEUTS SEG NFR BLD AUTO: 47 % (ref 43–75)
NRBC BLD AUTO-RTO: 0 /100 WBCS
PLATELET # BLD AUTO: 225 THOUSANDS/UL (ref 149–390)
PMV BLD AUTO: 11.5 FL (ref 8.9–12.7)
POTASSIUM SERPL-SCNC: 4.1 MMOL/L (ref 3.5–5.3)
PROT SERPL-MCNC: 7.1 G/DL (ref 6.4–8.4)
RBC # BLD AUTO: 4.82 MILLION/UL (ref 3.88–5.62)
SODIUM SERPL-SCNC: 139 MMOL/L (ref 135–147)
WBC # BLD AUTO: 6.01 THOUSAND/UL (ref 4.31–10.16)

## 2025-02-06 PROCEDURE — 85025 COMPLETE CBC W/AUTO DIFF WBC: CPT

## 2025-02-06 PROCEDURE — 36415 COLL VENOUS BLD VENIPUNCTURE: CPT

## 2025-02-06 PROCEDURE — 80053 COMPREHEN METABOLIC PANEL: CPT

## 2025-02-10 ENCOUNTER — RESULTS FOLLOW-UP (OUTPATIENT)
Dept: OTHER | Facility: HOSPITAL | Age: 70
End: 2025-02-10

## 2025-03-01 DIAGNOSIS — I71.43 INFRARENAL ABDOMINAL AORTIC ANEURYSM (AAA) WITHOUT RUPTURE (HCC): Primary | ICD-10-CM

## 2025-05-27 ENCOUNTER — TELEPHONE (OUTPATIENT)
Age: 70
End: 2025-05-27

## 2025-05-27 DIAGNOSIS — I71.43 INFRARENAL ABDOMINAL AORTIC ANEURYSM (AAA) WITHOUT RUPTURE (HCC): Primary | Chronic | ICD-10-CM

## 2025-05-27 NOTE — TELEPHONE ENCOUNTER
Received call from pt's PCP Dr. Kirkpatrick.  Pt hasn't been seen since his procedure NECTERO EAST Aortic balloon angioplasty (Abdomen) 11/8/24.  She's asking if pt needs to be seen, and if he needs any imaging.  Please advise.    Call back # [phone], cell [phone]

## 2025-05-28 ENCOUNTER — APPOINTMENT (OUTPATIENT)
Dept: LAB | Facility: IMAGING CENTER | Age: 70
End: 2025-05-28
Payer: COMMERCIAL

## 2025-05-28 DIAGNOSIS — H54.413A: ICD-10-CM

## 2025-05-28 DIAGNOSIS — K76.0 FATTY (CHANGE OF) LIVER, NOT ELSEWHERE CLASSIFIED: ICD-10-CM

## 2025-05-28 DIAGNOSIS — D47.2 GAMMOPATHY, MONOCLONAL: ICD-10-CM

## 2025-05-28 DIAGNOSIS — R94.5 NONSPECIFIC ABNORMAL RESULTS OF LIVER FUNCTION STUDY: ICD-10-CM

## 2025-05-28 DIAGNOSIS — E11.9 TYPE 2 DIABETES MELLITUS WITHOUT COMPLICATION, UNSPECIFIED WHETHER LONG TERM INSULIN USE (HCC): ICD-10-CM

## 2025-05-28 DIAGNOSIS — I71.9 AORTIC ANEURYSM, UNSPECIFIED PORTION OF AORTA, UNSPECIFIED WHETHER RUPTURED (HCC): ICD-10-CM

## 2025-05-28 DIAGNOSIS — Z79.899 ENCOUNTER FOR LONG-TERM (CURRENT) USE OF MEDICATIONS: ICD-10-CM

## 2025-05-28 DIAGNOSIS — I10 ESSENTIAL HYPERTENSION, BENIGN: ICD-10-CM

## 2025-05-28 DIAGNOSIS — F10.10 ALCOHOL ABUSE: ICD-10-CM

## 2025-05-28 DIAGNOSIS — E78.00 PURE HYPERCHOLESTEROLEMIA: ICD-10-CM

## 2025-05-28 DIAGNOSIS — E55.9 VITAMIN D DEFICIENCY: ICD-10-CM

## 2025-05-28 DIAGNOSIS — R31.9 HEMATURIA, UNSPECIFIED TYPE: ICD-10-CM

## 2025-05-28 DIAGNOSIS — N28.1 ACQUIRED CYST OF KIDNEY: ICD-10-CM

## 2025-05-28 DIAGNOSIS — E03.9 HYPOTHYROIDISM, UNSPECIFIED TYPE: ICD-10-CM

## 2025-05-28 LAB
25(OH)D3 SERPL-MCNC: 27.9 NG/ML (ref 30–100)
ALBUMIN SERPL BCG-MCNC: 3.9 G/DL (ref 3.5–5)
ALP SERPL-CCNC: 58 U/L (ref 34–104)
ALT SERPL W P-5'-P-CCNC: 41 U/L (ref 7–52)
ANION GAP SERPL CALCULATED.3IONS-SCNC: 11 MMOL/L (ref 4–13)
AST SERPL W P-5'-P-CCNC: 35 U/L (ref 13–39)
BACTERIA UR QL AUTO: ABNORMAL /HPF
BASOPHILS # BLD AUTO: 0.02 THOUSANDS/ÂΜL (ref 0–0.1)
BASOPHILS NFR BLD AUTO: 0 % (ref 0–1)
BILIRUB SERPL-MCNC: 0.82 MG/DL (ref 0.2–1)
BILIRUB UR QL STRIP: NEGATIVE
BUN SERPL-MCNC: 12 MG/DL (ref 5–25)
CALCIUM SERPL-MCNC: 9 MG/DL (ref 8.4–10.2)
CHLORIDE SERPL-SCNC: 104 MMOL/L (ref 96–108)
CHOLEST SERPL-MCNC: 219 MG/DL (ref ?–200)
CLARITY UR: ABNORMAL
CO2 SERPL-SCNC: 26 MMOL/L (ref 21–32)
COLOR UR: ABNORMAL
CREAT SERPL-MCNC: 1 MG/DL (ref 0.6–1.3)
CREAT UR-MCNC: 253 MG/DL
EOSINOPHIL # BLD AUTO: 0.14 THOUSAND/ÂΜL (ref 0–0.61)
EOSINOPHIL NFR BLD AUTO: 2 % (ref 0–6)
ERYTHROCYTE [DISTWIDTH] IN BLOOD BY AUTOMATED COUNT: 12.7 % (ref 11.6–15.1)
EST. AVERAGE GLUCOSE BLD GHB EST-MCNC: 189 MG/DL
GFR SERPL CREATININE-BSD FRML MDRD: 76 ML/MIN/1.73SQ M
GLUCOSE P FAST SERPL-MCNC: 169 MG/DL (ref 65–99)
GLUCOSE UR STRIP-MCNC: ABNORMAL MG/DL
HBA1C MFR BLD: 8.2 %
HCT VFR BLD AUTO: 47.7 % (ref 36.5–49.3)
HDLC SERPL-MCNC: 35 MG/DL
HGB BLD-MCNC: 15.5 G/DL (ref 12–17)
HGB UR QL STRIP.AUTO: ABNORMAL
HYALINE CASTS #/AREA URNS LPF: ABNORMAL /LPF
IMM GRANULOCYTES # BLD AUTO: 0.01 THOUSAND/UL (ref 0–0.2)
IMM GRANULOCYTES NFR BLD AUTO: 0 % (ref 0–2)
KETONES UR STRIP-MCNC: NEGATIVE MG/DL
LDLC SERPL CALC-MCNC: 115 MG/DL (ref 0–100)
LEUKOCYTE ESTERASE UR QL STRIP: NEGATIVE
LYMPHOCYTES # BLD AUTO: 2.22 THOUSANDS/ÂΜL (ref 0.6–4.47)
LYMPHOCYTES NFR BLD AUTO: 38 % (ref 14–44)
MCH RBC QN AUTO: 32.7 PG (ref 26.8–34.3)
MCHC RBC AUTO-ENTMCNC: 32.5 G/DL (ref 31.4–37.4)
MCV RBC AUTO: 101 FL (ref 82–98)
MICROALBUMIN UR-MCNC: 36.4 MG/L
MICROALBUMIN/CREAT 24H UR: 14 MG/G CREATININE (ref 0–30)
MONOCYTES # BLD AUTO: 0.54 THOUSAND/ÂΜL (ref 0.17–1.22)
MONOCYTES NFR BLD AUTO: 9 % (ref 4–12)
MUCOUS THREADS UR QL AUTO: ABNORMAL
NEUTROPHILS # BLD AUTO: 2.87 THOUSANDS/ÂΜL (ref 1.85–7.62)
NEUTS SEG NFR BLD AUTO: 51 % (ref 43–75)
NITRITE UR QL STRIP: NEGATIVE
NON-SQ EPI CELLS URNS QL MICRO: ABNORMAL /HPF
NONHDLC SERPL-MCNC: 184 MG/DL
NRBC BLD AUTO-RTO: 0 /100 WBCS
PH UR STRIP.AUTO: 5.5 [PH]
PLATELET # BLD AUTO: 210 THOUSANDS/UL (ref 149–390)
PMV BLD AUTO: 11.6 FL (ref 8.9–12.7)
POTASSIUM SERPL-SCNC: 4.3 MMOL/L (ref 3.5–5.3)
PROT SERPL-MCNC: 7 G/DL (ref 6.4–8.4)
PROT UR STRIP-MCNC: ABNORMAL MG/DL
PSA SERPL-MCNC: 0.57 NG/ML (ref 0–4)
RBC # BLD AUTO: 4.74 MILLION/UL (ref 3.88–5.62)
RBC #/AREA URNS AUTO: ABNORMAL /HPF
SODIUM SERPL-SCNC: 141 MMOL/L (ref 135–147)
SP GR UR STRIP.AUTO: 1.03 (ref 1–1.03)
TRIGL SERPL-MCNC: 343 MG/DL (ref ?–150)
TSH SERPL DL<=0.05 MIU/L-ACNC: 3.34 UIU/ML (ref 0.45–4.5)
UROBILINOGEN UR STRIP-ACNC: <2 MG/DL
WBC # BLD AUTO: 5.8 THOUSAND/UL (ref 4.31–10.16)
WBC #/AREA URNS AUTO: ABNORMAL /HPF

## 2025-05-28 PROCEDURE — 80061 LIPID PANEL: CPT

## 2025-05-28 PROCEDURE — 81001 URINALYSIS AUTO W/SCOPE: CPT

## 2025-05-28 PROCEDURE — 36415 COLL VENOUS BLD VENIPUNCTURE: CPT

## 2025-05-28 PROCEDURE — 82306 VITAMIN D 25 HYDROXY: CPT

## 2025-05-28 PROCEDURE — 82570 ASSAY OF URINE CREATININE: CPT

## 2025-05-28 PROCEDURE — 80053 COMPREHEN METABOLIC PANEL: CPT

## 2025-05-28 PROCEDURE — G0103 PSA SCREENING: HCPCS

## 2025-05-28 PROCEDURE — 85025 COMPLETE CBC W/AUTO DIFF WBC: CPT

## 2025-05-28 PROCEDURE — 83036 HEMOGLOBIN GLYCOSYLATED A1C: CPT

## 2025-05-28 PROCEDURE — 82043 UR ALBUMIN QUANTITATIVE: CPT

## 2025-05-28 PROCEDURE — 84443 ASSAY THYROID STIM HORMONE: CPT

## 2025-06-30 ENCOUNTER — HOSPITAL ENCOUNTER (OUTPATIENT)
Dept: RADIOLOGY | Age: 70
Discharge: HOME | End: 2025-06-30
Attending: SURGERY
Payer: COMMERCIAL

## 2025-06-30 DIAGNOSIS — I71.43 INFRARENAL ABDOMINAL AORTIC ANEURYSM (AAA) WITHOUT RUPTURE (HCC): ICD-10-CM

## 2025-06-30 PROCEDURE — 74174 CTA ABD&PLVS W/CONTRAST: CPT

## 2025-06-30 RX ADMIN — IOHEXOL 100 ML: 350 INJECTION, SOLUTION INTRAVENOUS at 08:39

## 2025-07-09 ENCOUNTER — OFFICE VISIT (OUTPATIENT)
Dept: VASCULAR SURGERY | Facility: CLINIC | Age: 70
End: 2025-07-09
Payer: COMMERCIAL

## 2025-07-09 VITALS
HEART RATE: 80 BPM | HEIGHT: 70 IN | DIASTOLIC BLOOD PRESSURE: 82 MMHG | WEIGHT: 237 LBS | BODY MASS INDEX: 33.93 KG/M2 | SYSTOLIC BLOOD PRESSURE: 130 MMHG

## 2025-07-09 DIAGNOSIS — E66.811 CLASS 1 OBESITY DUE TO EXCESS CALORIES WITHOUT SERIOUS COMORBIDITY WITH BODY MASS INDEX (BMI) OF 32.0 TO 32.9 IN ADULT: ICD-10-CM

## 2025-07-09 DIAGNOSIS — E11.9 TYPE 2 DIABETES MELLITUS WITHOUT COMPLICATION, WITHOUT LONG-TERM CURRENT USE OF INSULIN (HCC): ICD-10-CM

## 2025-07-09 DIAGNOSIS — E66.09 CLASS 1 OBESITY DUE TO EXCESS CALORIES WITHOUT SERIOUS COMORBIDITY WITH BODY MASS INDEX (BMI) OF 32.0 TO 32.9 IN ADULT: ICD-10-CM

## 2025-07-09 DIAGNOSIS — I71.43 INFRARENAL ABDOMINAL AORTIC ANEURYSM (AAA) WITHOUT RUPTURE (HCC): Primary | Chronic | ICD-10-CM

## 2025-07-09 PROBLEM — R79.89 ELEVATED LFTS: Status: RESOLVED | Noted: 2020-08-25 | Resolved: 2025-07-09

## 2025-07-09 PROCEDURE — 99213 OFFICE O/P EST LOW 20 MIN: CPT | Performed by: SURGERY

## 2025-07-09 RX ORDER — HYDROCHLOROTHIAZIDE 25 MG/1
1 TABLET ORAL DAILY
COMMUNITY
Start: 2025-06-05

## 2025-07-09 NOTE — PATIENT INSTRUCTIONS
Less beer more walking    Please go back to your previous routine where you walk a lot and stay active.  Will need to discuss with your primary care regarding high cholesterol, may need to increase the medicine to 40mg.    The aortic aneurysm looks stable, will need to check again in 6 months with CT scan.    We discussed the importance of weight loss.  Set realistic goal of losing  0.5 lb per week for about 2-3 lb per month, 25 lb or so per year.  Regular walking, avoid excess beer and high carb diet.  Walking after meals is also very good.

## 2025-07-09 NOTE — ASSESSMENT & PLAN NOTE
Lab Results   Component Value Date    HGBA1C 8.2 (H) 05/28/2025     His A1C is increased continue with metformin.  Need to lose weight to help control diabetes.

## 2025-07-09 NOTE — PROGRESS NOTES
[facility] Emergency Department    [address] [address] [facility] [address]    Phone:  [phone]    Fax:  [phone]                                       Antonia Marc   MRN: [id number]    Department:  [facility] Emergency Department   Date of Visit:  4/6/2018           After Visit Summary Signature Page     I have received my discharge instructions, and my questions have been answered. I have discussed any challenges I see with this plan with the nurse or doctor.    ..........................................................................................................................................  Patient/Patient Representative Signature      ..........................................................................................................................................  Patient Representative Print Name and Relationship to Patient    ..................................................               ................................................  Date                                            Time    ..........................................................................................................................................  Reviewed by Signature/Title    ...................................................              ..............................................  Date                                                            Time           Name: Felice Jose      : 1955      MRN: [id number]  Encounter Provider: Zarina Yousif MD  Encounter Date: 2025   Enc[facility] department: [facility]  :  Assessment & Plan  Infrarenal abdominal aortic aneurysm (AAA) without rupture (HCC)  Underwent nectero east treatment of AAA in 2024.  He is doing well overall.  Labwork was reviewed, normal LFTs.    I have personally reviewed the CT imaging and my independent interpretation is as follows:  Stable size of AAA, some mural thrombus in the sac.  Iliac and renal are patent.    Repeat CTA in 6 months as per protocol.    Cholesterol is high, we had a discussion about weight loss.  Orders:  •  CTA abdomen pelvis w wo contrast; Future  •  Basic metabolic panel; Future    Class 1 obesity due to excess calories without serious comorbidity with body mass index (BMI) of 32.0 to 32.9 in adult  We discussed the importance of weight loss.  Set realistic goal of losing  0.5 lb per week for about 2-3 lb per month, 25 lb or so per year.  Regular exercise and diet modification is important.         Type 2 diabetes mellitus without complication, without long-term current use of insulin (HCC)    Lab Results   Component Value Date    HGBA1C 8.2 (H) 2025     His A1C is increased continue with metformin.  Need to lose weight to help control diabetes.             History of Present Illness   HPI  Felice Jose is a 70 y.o. male who presents for AAA follow up.      Patient presents to review CTA abd/ pelvis s/p Nectoero aortic treatment done 2024. Patient is asymptomatic.     History obtained from: patient    Review of Systems   Constitutional: Negative.    HENT: Negative.     Eyes: Negative.    Respiratory: Negative.     Cardiovascular: Negative.    Gastrointestinal: Negative.    Endocrine: Negative.    Genitourinary: Negative.    Musculoskeletal: Negative.    Skin: Negative.    Allergic/Immunologic: Negative.    Neurological: Negative.   "  Hematological: Negative.    Psychiatric/Behavioral: Negative.     I have reviewed the ROS as entered and made changes as necessary.    Past Medical History   Past Medical History[1]  Past Surgical History[2]  Family History[3]   reports that he has never smoked. He has never been exposed to tobacco smoke. He has never used smokeless tobacco. He reports current alcohol use of about 8.0 standard drinks of alcohol per week. He reports that he does not use drugs.  Current Outpatient Medications   Medication Instructions   • albuterol (PROVENTIL HFA,VENTOLIN HFA) 90 mcg/act inhaler 2 puffs, Inhalation, Every 4 hours PRN   • amLODIPine (NORVASC) 10 mg, Daily   • aspirin (ECOTRIN LOW STRENGTH) 81 mg, Oral, 2 times daily   • cholecalciferol (VITAMIN D3) 2,000 Units, Oral, Daily   • hydroCHLOROthiazide 25 mg tablet 1 tablet, Oral, Daily   • losartan (COZAAR) 50 mg, Daily   • metFORMIN (GLUCOPHAGE-XR) 500 mg, Every morning   • NP Thyroid 60 MG tablet    • rosuvastatin (CRESTOR) 20 mg, Daily at bedtime   Allergies[4]      Objective   /82 (BP Location: Left arm, Patient Position: Sitting, Cuff Size: Standard)   Pulse 80   Ht 5' 10\" (1.778 m)   Wt 108 kg (237 lb)   BMI 34.01 kg/m²      Physical Exam  Vitals and nursing note reviewed.   Constitutional:       Appearance: Normal appearance.   HENT:      Head: Normocephalic and atraumatic.     Cardiovascular:      Rate and Rhythm: Normal rate and regular rhythm.      Pulses:           Dorsalis pedis pulses are 2+ on the right side and 2+ on the left side.   Abdominal:      General: There is no distension.      Palpations: Abdomen is soft. There is no mass.      Tenderness: There is no abdominal tenderness.      Hernia: No hernia is present.     Musculoskeletal:      Right lower leg: No edema.      Left lower leg: No edema.     Skin:     General: Skin is warm and dry.      Capillary Refill: Capillary refill takes less than 2 seconds.     Neurological:      General: No " focal deficit present.      Mental Status: He is alert and oriented to person, place, and time.                  [1]  Past Medical History:  Diagnosis Date   • Abdominal pain    • Acute blood loss anemia 08/22/2023   • Acute infection of external ear    • BPH with obstruction/lower urinary tract symptoms    • BPH without urinary obstruction 11/2013   • Chills (without fever)    • Chronic sinusitis    • Diabetes mellitus (HCC)    • Disease of thyroid gland    • Elevated LFTs 08/25/2020   • Enlarged prostate    • Fatty liver    • Headache    • Hematuria    • Hypertension    • Joint pain    • Kidney stone    • Legally blind in right eye, as defined in [place]     shadows only   • Nausea and vomiting    • Retention of urine    • Streptococcal sore throat    • Ureteral calculi    • Wheezing    [2]  Past Surgical History:  Procedure Laterality Date   • COLONOSCOPY  02/2010    by -colon polyps   • COLONOSCOPY  12/28/2017    Anupam Shay M.D. / multiple polyps removed, 1.5 cm polyp ascending colon-tubulovillous adenoma, 20 mm polyp descending colon and 2 other smaller polyps all tubular adenomas   • COLONOSCOPY  02/20/2019    Lincoln Bautista M.D. / 3 polyps-tubular adenomas, diverticulosis   • COLONOSCOPY  11/10/2022    8 polyps tubular adenomas, biopsy of ileocecal valve showed hyperplastic polyp by Dr. Bautista   • CYSTOSCOPY  2013   • EYE SURGERY  2000   • IR BIOPSY BONE MARROW  09/23/2021   • KNEE ARTHROSCOPY Bilateral    • GA ARTHRP ACETBLR/PROX FEM PROSTC AGRFT/ALGRFT Left 8/21/2023    Procedure: ARTHROPLASTY HIP TOTAL;  Surgeon: Shreyas Longo DO;  Location: [facility];  Service: Orthopedics   • GA SLCTV CATHJ 3RD+ ORD SLCTV ABDL PEL/LXTR BRNCH N/A 11/8/2024    Procedure: NECTERO EAST Aortic balloon angioplasty;  Surgeon: Zarina Yousif MD;  Location: [facility];  Service: Vascular   [3]  Family History  Problem Relation Name Age of Onset   • Breast cancer Mother     • Cancer Father           not certain   • Colon polyps Sister     [4]  Allergies  Allergen Reactions   • Lisinopril Cough

## 2025-07-09 NOTE — ASSESSMENT & PLAN NOTE
Underwent [facility] treatment of AAA in Nov 2024.  He is doing well overall.  Labwork was reviewed, normal LFTs.    I have personally reviewed the CT imaging and my independent interpretation is as follows:  Stable size of AAA, some mural thrombus in the sac.  Iliac and renal are patent.    Repeat CTA in 6 months as per protocol.    Cholesterol is high, we had a discussion about weight loss.  Orders:  •  CTA abdomen pelvis w wo contrast; Future  •  Basic metabolic panel; Future

## (undated) DEVICE — SAW BLADE OSCILLATING BRAZOL 167

## (undated) DEVICE — GLOVE INDICATOR PI UNDERGLOVE SZ 8 BLUE

## (undated) DEVICE — CAPIT HIP COP -CMNT/POR-ACTIS

## (undated) DEVICE — GLOVE INDICATOR PI UNDERGLOVE SZ 8.5 BLUE

## (undated) DEVICE — GLOVE INDICATOR PI UNDERGLOVE SZ 6.5 BLUE

## (undated) DEVICE — FLUID MANAGEMENT KIT - IR

## (undated) DEVICE — 450 ML BOTTLE OF 0.05% CHLORHEXIDINE GLUCONATE IN 99.95% STERILE WATER FOR IRRIGATION, USP AND APPLICATOR.: Brand: IRRISEPT ANTIMICROBIAL WOUND LAVAGE

## (undated) DEVICE — HEAVY DUTY TABLE COVER: Brand: CONVERTORS

## (undated) DEVICE — GLOVE SRG BIOGEL 6.5

## (undated) DEVICE — SYRINGE KIT,PACKAGED,,150FT,MK 7(ANGIO-ARTERION, 150ML SYR KIT W/QFT,MC)(60729385): Brand: MEDRAD® MARK 7 ARTERION DISPOSABLE SYRINGE 150 ML WITH QUICK FILL TUBE

## (undated) DEVICE — ADHESIVE SKIN HIGH VISCOSITY EXOFIN 1ML

## (undated) DEVICE — 3M™ IOBAN™ 2 ANTIMICROBIAL INCISE DRAPE 6648EZ: Brand: IOBAN™ 2

## (undated) DEVICE — SUT STRATAFIX SPIRAL PLUS 3-0 PS-2 30 X 30 CM SXMP2B408

## (undated) DEVICE — Device

## (undated) DEVICE — FLEXCIL HIGH PRESSURE CONTRAST INJECTION LINE: Brand: NAMIC

## (undated) DEVICE — NEEDLE 18 G X 1 1/2

## (undated) DEVICE — HANDPIECE SET WITH RETRACTABLE COAXIAL FAN SPRAY TIP AND SUCTION TUBE: Brand: INTERPULSE

## (undated) DEVICE — NON-DEHP HIGH FLOW RATE EXTENSION SET, MALE LUER LOCK ADAPTER

## (undated) DEVICE — CAPIT UPCHRG EMPHASYS ACETABULAR

## (undated) DEVICE — 3M™ TEGADERM™ TRANSPARENT FILM DRESSING FRAME STYLE, 1627, 4 IN X 10 IN (10 CM X 25 CM), 20/CT 4CT/CASE: Brand: 3M™ TEGADERM™

## (undated) DEVICE — PROBE COVER: Brand: STERILE PROBE COVER

## (undated) DEVICE — HOOD: Brand: FLYTE, SURGICOOL

## (undated) DEVICE — SYRINGE 50ML LL

## (undated) DEVICE — SPECIMEN CONTAINER STERILE PEEL PACK

## (undated) DEVICE — INFUSER BAG 500ML

## (undated) DEVICE — IV SET 15 DROP STERILE 0/Y GRAVITY

## (undated) DEVICE — CATH DIAG 5FR .035 65CM 6S OMMI-FLUSH

## (undated) DEVICE — GAUZE SPONGES,16 PLY: Brand: CURITY

## (undated) DEVICE — GUIDEWIRE LUNDERQUIST XTRA STIFF 0.035IN 260CM LES

## (undated) DEVICE — SUT VICRYL 1 CT-1 27 IN J261H

## (undated) DEVICE — BETHLEHEM TOTAL HIP, KIT: Brand: CARDINAL HEALTH

## (undated) DEVICE — PINNACLE R/O II INTRODUCER SHEATH WITH RADIOPAQUE MARKER: Brand: PINNACLE

## (undated) DEVICE — GLOVE SRG BIOGEL 7.5

## (undated) DEVICE — SCD SEQUENTIAL COMPRESSION COMFORT SLEEVE MEDIUM KNEE LENGTH: Brand: KENDALL SCD

## (undated) DEVICE — SUT STRATAFIX SPIRAL PDS PLUS 1 CTX 18 IN SXPP1A400

## (undated) DEVICE — CHECK-FLO PERFORMER INTRODUCER: Brand: PERFORMER

## (undated) DEVICE — SUT VICRYL 2-0 CT-1 27 IN J259H

## (undated) DEVICE — INTENDED FOR TISSUE SEPARATION, AND OTHER PROCEDURES THAT REQUIRE A SHARP SURGICAL BLADE TO PUNCTURE OR CUT.: Brand: BARD-PARKER ® CARBON RIB-BACK BLADES

## (undated) DEVICE — TIBURON HIP DRAPE WITH POUCHES: Brand: CONVERTORS

## (undated) DEVICE — PLUMEPEN PRO 10FT

## (undated) DEVICE — DRESSING MEPILEX AG BORDER POST-OP 4 X 12 IN

## (undated) DEVICE — SHEATH INTRO DRY SEAL 14FR 33CM

## (undated) DEVICE — MICROPUNCTURE SET 4FR 10CM .018 NITINOL TRANSITIONLESS TIP

## (undated) DEVICE — PACK CUSTOM CARDIOVASCULAR

## (undated) DEVICE — GLOVE SRG BIOGEL 8.5

## (undated) DEVICE — RADIFOCUS TORQUE DEVICE MULTI-TORQUE VISE: Brand: RADIFOCUS TORQUE DEVICE

## (undated) DEVICE — SNAP KOVER: Brand: UNBRANDED

## (undated) DEVICE — MICROPUNCTURE 501

## (undated) DEVICE — IMPERVIOUS STOCKINETTE: Brand: DEROYAL

## (undated) DEVICE — 3M™ STERI-DRAPE™ U-DRAPE 1015: Brand: STERI-DRAPE™

## (undated) DEVICE — SUT VICRYL 0 CT-1 36 IN J946H

## (undated) DEVICE — DILATOR: Brand: COOK

## (undated) DEVICE — 3000CC GUARDIAN II: Brand: GUARDIAN

## (undated) DEVICE — SURGICAL GOWN, XL SMARTSLEEVE: Brand: CONVERTORS

## (undated) DEVICE — COBAN 6 IN STERILE

## (undated) DEVICE — CATH DIAG 5FR 38 65CM NS SIM1

## (undated) DEVICE — GLOVE SRG BIOGEL ORTHOPEDIC 8.5

## (undated) DEVICE — EXOFIN PRECISION PEN HIGH VISCOSITY TOPICAL SKIN ADHESIVE: Brand: EXOFIN PRECISION PEN, 1G